# Patient Record
Sex: FEMALE | Race: WHITE | NOT HISPANIC OR LATINO | Employment: OTHER | URBAN - METROPOLITAN AREA
[De-identification: names, ages, dates, MRNs, and addresses within clinical notes are randomized per-mention and may not be internally consistent; named-entity substitution may affect disease eponyms.]

---

## 2017-01-04 ENCOUNTER — PATIENT MESSAGE (OUTPATIENT)
Dept: INTERNAL MEDICINE | Facility: CLINIC | Age: 82
End: 2017-01-04

## 2017-01-05 RX ORDER — NORTRIPTYLINE HYDROCHLORIDE 10 MG/1
10 CAPSULE ORAL NIGHTLY
Qty: 30 CAPSULE | Refills: 11 | Status: SHIPPED | OUTPATIENT
Start: 2017-01-05 | End: 2017-01-23 | Stop reason: SDUPTHER

## 2017-01-18 ENCOUNTER — PATIENT MESSAGE (OUTPATIENT)
Dept: INTERNAL MEDICINE | Facility: CLINIC | Age: 82
End: 2017-01-18

## 2017-01-23 ENCOUNTER — IMMUNIZATION (OUTPATIENT)
Dept: INTERNAL MEDICINE | Facility: CLINIC | Age: 82
End: 2017-01-23
Payer: MEDICARE

## 2017-01-23 ENCOUNTER — OFFICE VISIT (OUTPATIENT)
Dept: INTERNAL MEDICINE | Facility: CLINIC | Age: 82
End: 2017-01-23
Payer: MEDICARE

## 2017-01-23 VITALS
SYSTOLIC BLOOD PRESSURE: 120 MMHG | HEART RATE: 68 BPM | HEIGHT: 61 IN | DIASTOLIC BLOOD PRESSURE: 72 MMHG | WEIGHT: 141.56 LBS | BODY MASS INDEX: 26.73 KG/M2

## 2017-01-23 DIAGNOSIS — E11.49 TYPE II DIABETES MELLITUS WITH NEUROLOGICAL MANIFESTATIONS: Primary | ICD-10-CM

## 2017-01-23 DIAGNOSIS — Z23 VACCINE FOR STREPTOCOCCUS PNEUMONIAE AND INFLUENZA: ICD-10-CM

## 2017-01-23 DIAGNOSIS — H91.93 BILATERAL HEARING LOSS, UNSPECIFIED HEARING LOSS TYPE: ICD-10-CM

## 2017-01-23 DIAGNOSIS — F02.80 LATE ONSET ALZHEIMER'S DISEASE WITHOUT BEHAVIORAL DISTURBANCE: ICD-10-CM

## 2017-01-23 DIAGNOSIS — G30.1 LATE ONSET ALZHEIMER'S DISEASE WITHOUT BEHAVIORAL DISTURBANCE: ICD-10-CM

## 2017-01-23 PROCEDURE — 99214 OFFICE O/P EST MOD 30 MIN: CPT | Mod: 25,S$GLB,, | Performed by: INTERNAL MEDICINE

## 2017-01-23 PROCEDURE — 1160F RVW MEDS BY RX/DR IN RCRD: CPT | Mod: S$GLB,,, | Performed by: INTERNAL MEDICINE

## 2017-01-23 PROCEDURE — 1159F MED LIST DOCD IN RCRD: CPT | Mod: S$GLB,,, | Performed by: INTERNAL MEDICINE

## 2017-01-23 PROCEDURE — 90670 PCV13 VACCINE IM: CPT | Mod: S$GLB,,, | Performed by: INTERNAL MEDICINE

## 2017-01-23 PROCEDURE — 1125F AMNT PAIN NOTED PAIN PRSNT: CPT | Mod: S$GLB,,, | Performed by: INTERNAL MEDICINE

## 2017-01-23 PROCEDURE — 90662 IIV NO PRSV INCREASED AG IM: CPT | Mod: S$GLB,,, | Performed by: INTERNAL MEDICINE

## 2017-01-23 PROCEDURE — 99999 PR PBB SHADOW E&M-EST. PATIENT-LVL III: CPT | Mod: PBBFAC,,, | Performed by: INTERNAL MEDICINE

## 2017-01-23 PROCEDURE — G0009 ADMIN PNEUMOCOCCAL VACCINE: HCPCS | Mod: S$GLB,,, | Performed by: INTERNAL MEDICINE

## 2017-01-23 PROCEDURE — G0008 ADMIN INFLUENZA VIRUS VAC: HCPCS | Mod: S$GLB,,, | Performed by: INTERNAL MEDICINE

## 2017-01-23 PROCEDURE — 1157F ADVNC CARE PLAN IN RCRD: CPT | Mod: S$GLB,,, | Performed by: INTERNAL MEDICINE

## 2017-01-23 PROCEDURE — 99499 UNLISTED E&M SERVICE: CPT | Mod: S$GLB,,, | Performed by: INTERNAL MEDICINE

## 2017-01-23 RX ORDER — NORTRIPTYLINE HYDROCHLORIDE 10 MG/1
10 CAPSULE ORAL NIGHTLY
Qty: 90 CAPSULE | Refills: 11 | Status: SHIPPED | OUTPATIENT
Start: 2017-01-23 | End: 2017-04-25

## 2017-01-23 RX ORDER — METFORMIN HYDROCHLORIDE 500 MG/1
500 TABLET, EXTENDED RELEASE ORAL
Qty: 30 TABLET | Refills: 12 | Status: SHIPPED | OUTPATIENT
Start: 2017-01-23 | End: 2017-02-22

## 2017-01-23 RX ORDER — PRIMIDONE 50 MG/1
TABLET ORAL
Qty: 30 TABLET | Refills: 12 | Status: ON HOLD
Start: 2017-01-23 | End: 2017-03-24 | Stop reason: HOSPADM

## 2017-01-26 ENCOUNTER — HOSPITAL ENCOUNTER (EMERGENCY)
Facility: HOSPITAL | Age: 82
Discharge: HOME | End: 2017-01-26
Attending: EMERGENCY MEDICINE
Payer: MEDICARE

## 2017-01-26 ENCOUNTER — PATIENT MESSAGE (OUTPATIENT)
Dept: INTERNAL MEDICINE | Facility: CLINIC | Age: 82
End: 2017-01-26

## 2017-01-26 ENCOUNTER — TELEPHONE (OUTPATIENT)
Dept: INTERNAL MEDICINE | Facility: CLINIC | Age: 82
End: 2017-01-26

## 2017-01-26 VITALS
DIASTOLIC BLOOD PRESSURE: 83 MMHG | RESPIRATION RATE: 20 BRPM | TEMPERATURE: 98 F | HEART RATE: 69 BPM | WEIGHT: 141 LBS | OXYGEN SATURATION: 94 % | BODY MASS INDEX: 26.64 KG/M2 | SYSTOLIC BLOOD PRESSURE: 179 MMHG

## 2017-01-26 DIAGNOSIS — I10 ESSENTIAL HYPERTENSION: ICD-10-CM

## 2017-01-26 DIAGNOSIS — E11.49 TYPE II DIABETES MELLITUS WITH NEUROLOGICAL MANIFESTATIONS: ICD-10-CM

## 2017-01-26 DIAGNOSIS — I10 HYPERTENSION: ICD-10-CM

## 2017-01-26 DIAGNOSIS — R42 VERTIGO: ICD-10-CM

## 2017-01-26 DIAGNOSIS — R51.9 ACUTE NONINTRACTABLE HEADACHE, UNSPECIFIED HEADACHE TYPE: Primary | ICD-10-CM

## 2017-01-26 LAB
ALBUMIN SERPL BCP-MCNC: 3.3 G/DL
ALP SERPL-CCNC: 83 U/L
ALT SERPL W/O P-5'-P-CCNC: 14 U/L
ANION GAP SERPL CALC-SCNC: 7 MMOL/L
AST SERPL-CCNC: 15 U/L
BILIRUB SERPL-MCNC: 0.4 MG/DL
BUN SERPL-MCNC: 15 MG/DL
CALCIUM SERPL-MCNC: 9 MG/DL
CHLORIDE SERPL-SCNC: 101 MMOL/L
CO2 SERPL-SCNC: 29 MMOL/L
CREAT SERPL-MCNC: 0.8 MG/DL
EST. GFR  (AFRICAN AMERICAN): >60 ML/MIN/1.73 M^2
EST. GFR  (NON AFRICAN AMERICAN): >60 ML/MIN/1.73 M^2
GLUCOSE SERPL-MCNC: 284 MG/DL
POTASSIUM SERPL-SCNC: 3.8 MMOL/L
PROT SERPL-MCNC: 6.4 G/DL
SODIUM SERPL-SCNC: 137 MMOL/L

## 2017-01-26 PROCEDURE — 93005 ELECTROCARDIOGRAM TRACING: CPT

## 2017-01-26 PROCEDURE — 96365 THER/PROPH/DIAG IV INF INIT: CPT

## 2017-01-26 PROCEDURE — 25000003 PHARM REV CODE 250: Performed by: STUDENT IN AN ORGANIZED HEALTH CARE EDUCATION/TRAINING PROGRAM

## 2017-01-26 PROCEDURE — 63600175 PHARM REV CODE 636 W HCPCS: Performed by: STUDENT IN AN ORGANIZED HEALTH CARE EDUCATION/TRAINING PROGRAM

## 2017-01-26 PROCEDURE — 96367 TX/PROPH/DG ADDL SEQ IV INF: CPT

## 2017-01-26 PROCEDURE — 93010 ELECTROCARDIOGRAM REPORT: CPT | Mod: ,,, | Performed by: INTERNAL MEDICINE

## 2017-01-26 PROCEDURE — 99285 EMERGENCY DEPT VISIT HI MDM: CPT | Mod: 25

## 2017-01-26 PROCEDURE — 80053 COMPREHEN METABOLIC PANEL: CPT

## 2017-01-26 PROCEDURE — 99285 EMERGENCY DEPT VISIT HI MDM: CPT | Mod: ,,, | Performed by: EMERGENCY MEDICINE

## 2017-01-26 PROCEDURE — 63600175 PHARM REV CODE 636 W HCPCS: Performed by: EMERGENCY MEDICINE

## 2017-01-26 RX ORDER — ACETAMINOPHEN 10 MG/ML
1000 INJECTION, SOLUTION INTRAVENOUS
Status: COMPLETED | OUTPATIENT
Start: 2017-01-26 | End: 2017-01-26

## 2017-01-26 RX ORDER — MECLIZINE HCL 12.5 MG 12.5 MG/1
12.5 TABLET ORAL 3 TIMES DAILY PRN
Qty: 20 TABLET | Refills: 0 | Status: SHIPPED | OUTPATIENT
Start: 2017-01-26 | End: 2020-10-01

## 2017-01-26 RX ADMIN — ACETAMINOPHEN 1000 MG: 10 INJECTION, SOLUTION INTRAVENOUS at 07:01

## 2017-01-26 RX ADMIN — PROMETHAZINE HYDROCHLORIDE 12.5 MG: 25 INJECTION, SOLUTION INTRAMUSCULAR; INTRAVENOUS at 10:01

## 2017-01-26 NOTE — TELEPHONE ENCOUNTER
Dizzy(chronic although) top of head is hurting /  / no nausea /vomiting or diarrhea. Pt is asleep right now she will call me when she wakes up with more details

## 2017-01-26 NOTE — TELEPHONE ENCOUNTER
----- Message from Lore Martinez sent at 1/26/2017  8:18 AM CST -----  Contact: daughter/joselyn/[phone]  Pt daughter called in regard to talking to the ma about the pt med for metformin (GLUCOPHAGE-XR) 500 MG 24 hr tablet. She said that the pt maybe having a side affect.      Please advise

## 2017-01-26 NOTE — TELEPHONE ENCOUNTER
daughter states she is feeling better with the dizziness still has slight headache. She feels that the metformin is the cause. Would like recommendation if to continue it or change to something different.

## 2017-01-26 NOTE — ED AVS SNAPSHOT
[facility]  [address]  [facility] [address]               Zeina T Estopinal   2017  6:08 PM   ED    Description:  Female : 1927   Department:  [facility]           Your Care was Coordinated By:     Provider Role From To    Carmen Osuna MD Attending Provider 17 --    Mychal Malone MD Resident 17 --      Reason for Visit     Hypertension           Diagnoses this Visit        Comments    Acute nonintractable headache, unspecified headache type    -  Primary     Vertigo         Essential hypertension           ED Disposition     ED Disposition Condition Comment    Discharge             To Do List           Follow-up Information     Follow up with Alan Kaplan MD In 3 days.    Specialty:  Internal Medicine    Contact information:    [address]  [facility] [address]  [phone]         These Medications        Disp Refills Start End    meclizine (ANTIVERT) 12.5 mg tablet 20 tablet 0 2017     Take 1 tablet (12.5 mg total) by mouth 3 (three) times daily as needed for Dizziness. - Oral    Pharmacy: [facility] [address] [place] [address] [place] [address] CAR[address]UZ AT [facility] Ph #: [phone]         [facility]     [facility] -  Assistance  Registered nurses in the [facility] provide clinical advisement, health education, appointment booking, and other advisory services.  Call for this free service at [phone].             Medications           Message regarding Medications     Verify the changes and/or additions to your medication regime listed below are the same as discussed with your clinician today.  If any of these changes or additions are incorrect, please notify your healthcare provider.        START taking these NEW medications        Refills    meclizine (ANTIVERT) 12.5 mg tablet 0    Sig: Take 1 tablet (12.5 mg total) by mouth  3 (three) times daily as needed for Dizziness.    Class: Print    Route: Oral      These medications were administered today        Dose Freq    acetaminophen (10 mg/mL) injection 1,000 mg 1,000 mg ED 1 Time    Sig: Inject 100 mLs (1,000 mg total) into the vein ED 1 Time.    Class: Normal    Route: Intravenous    promethazine (PHENERGAN) 12.5 mg in dextrose 5 % 50 mL IVPB 12.5 mg ED 1 Time    Sig: Inject 12.5 mg into the vein ED 1 Time.    Class: Normal    Route: Intravenous           Verify that the below list of medications is an accurate representation of the medications you are currently taking.  If none reported, the list may be blank. If incorrect, please contact your healthcare provider. Carry this list with you in case of emergency.           Current Medications     lisinopril (PRINIVIL,ZESTRIL) 40 MG tablet TAKE 1 TABLET(40 MG) BY MOUTH EVERY DAY    metformin (GLUCOPHAGE-XR) 500 MG 24 hr tablet Take 1 tablet (500 mg total) by mouth daily with breakfast.    nortriptyline (PAMELOR) 10 MG capsule Take 1 capsule (10 mg total) by mouth every evening.    ranitidine (ZANTAC) 300 MG tablet TAKE 1 TABLET BY MOUTH EVERY EVENING    simvastatin (ZOCOR) 40 MG tablet TAKE 1 TABLET BY MOUTH ONCE DAILY    BLOOD SUGAR DIAGNOSTIC (ONE TOUCH ULTRA TEST MISC) Once.    blood sugar diagnostic (ONETOUCH ULTRA TEST) Strp Check sugars once daily 250.00    diphenoxylate-atropine 2.5-0.025 mg (LOMOTIL) 2.5-0.025 mg per tablet TAKE 1 TABLET BY MOUTH EVERY DAY    lancets (ONETOUCH DELICA LANCETS) 33 gauge Misc 1 lancet by Misc.(Non-Drug; Combo Route) route Daily.    meclizine (ANTIVERT) 12.5 mg tablet Take 1 tablet (12.5 mg total) by mouth 3 (three) times daily as needed for Dizziness.    ONE TOUCH DELICA LANCETS 33 gauge Misc     primidone (MYSOLINE) 50 MG Tab 1/4 tab q hs    sulindac (CLINORIL) 150 MG tablet Take 1 tablet (150 mg total) by mouth 2 (two) times daily.           Clinical Reference Information           Your Vitals Were      BP Pulse Temp Resp Weight SpO2    179/83 69 98.2 °F (36.8 °C) (Oral) 20 64 kg (141 lb) 94%    BMI                26.64 kg/m2          Allergies as of 1/26/2017        Reactions    Naproxen     Other reaction(s): Jittery      Immunizations Administered on Date of Encounter - 1/26/2017     None      ED Micro, Lab, POCT     Start Ordered       Status Ordering Provider    01/26/17 1939 01/26/17 1939  Comprehensive metabolic panel  STAT      Final result       ED Imaging Orders     Start Ordered       Status Ordering Provider    01/26/17 1939 01/26/17 1939  CT Head Without Contrast  1 time imaging      Final result         Discharge Instructions         Self-Care for Headaches  Most headaches aren't serious and can be relieved with self-care. But some headaches may be a sign of another health problem like eye trouble or high blood pressure. To find the best treatment, learn what kind of headaches you get. For tension headaches, self-care will usually help. To treat migraines, ask your healthcare provider for advice. It is also possible to get both tension and migraine headaches. Self-care involves relieving the pain and avoiding headache triggers if you can.    Ways to reduce pain and tension  Try these steps:  · Apply a cold compress or ice pack to the pain site.  · Drink fluids. If nausea makes it hard to drink, try sucking on ice.  · Rest. Protect yourself from bright light and loud noises.  · Calm your emotions by imagining a peaceful scene.  · Massage tight neck, shoulder, and head muscles.  · To relax muscles, soak in a hot bath or use a hot shower.  Use medicines  Aspirin or aspirin substitutes, such as ibuprofen and acetaminophen, can relieve headache. Remember: Never give aspirin to anyone 18 years old or younger because of the risk of developing Reye syndrome. Use pain medicines only when necessary.  Track your headaches  Keeping a headache diary can help you and your healthcare provider identify what's  "causing your headaches:  · Note when each headache happens.  · Identify your activities and the foods you've eaten 6 to 8 hours before the headache began.  · Look for any trends or "triggers."  Signs of tension headache  Any of the following can be signs:  · Dull pain or feeling of pressure in a tight band around your head  · Pain in your neck or shoulders  · Headache without a definite beginning or end  · Headache after an activity such as driving or working on a computer  Signs of migraine  Any of the following can be signs:  · Throbbing pain on one or both sides of your head  · Nausea or vomiting  · Extreme sensitivity to light, sound, and smells  · Bright spots, flashes, or other visual changes  · Pain or nausea so severe that you can't continue your daily activities  Call your healthcare provider   If you have any of the following symptoms, contact your healthcare provider:  · A headache that lingers after a recent injury or bump to the head.  · A fever with a stiff neck or pain when you bend your head toward your chest.  · A headache along with slurred speech, changes in your vision, or numbness or weakness in your arms or legs.  · A headache for longer than 3 days.  · Frequent headaches, especially in the morning.  · Headaches with seizures   · Seek immediate medical attention if you have a headache that you would call "the worst headache you have ever had."   © 9957-0513 [employer]. [address] [place] [address], [place] [address]. All rights reserved. This information is not intended as a substitute for professional medical care. Always follow your healthcare professional's instructions.        Vertigo (Unknown Cause)    In addition to helping with hearing, the inner ear is part of the balance center of your body. Problems with the inner ear can a false feeling of motion. This is called vertigo. Often, it feels as if you or the room is spinning. A vertigo attack may cause sudden nausea, vomiting and " heavy sweating. Severe vertigo causes a loss of balance and can cause you to fall. During vertigo, small head movements and changes in body position will often make the symptoms worse. You may also have ringing in the ears called tinnitus.  An episode of vertigo may last seconds, minutes or hours. Once you are over the first episode, it may never come back. However, symptoms may return off and on.  The cause of your vertigo is not yet known. Possible causes of vertigo include:  · Inflammation of the inner ear  · Disease of the nerves to the inner ear  · Movement of calcium particles in the inner ear  · Poor blood flow to the balance centers of the brain  · Migraine headaches  Home care  · If symptoms are severe, rest quietly in bed. Change positions very slowly. There is usually one position that will feel best, such as lying on one side or lying on your back with your head slightly raised on pillows.  · Do not drive a car or work with dangerous machinery until symptoms have been gone for at least one week.  · Take medicine as prescribed to relieve your symptoms. Unless another medicine was prescribed for symptoms of nausea, vomiting, and dizziness, you may use over-the-counter motion sickness pills. Ask your pharmacist for suggestions.  Follow-up care  Follow up with your healthcare provider or as directed. If you are referred to a specialist or for testing, make the appointment promptly.  When to seek medical advice  Call your healthcare provider if any of the following occur:  · Fever of 100.4°F (38ºC) or higher, or as directed by your healthcare provider  · Vertigo worsens or is not controlled by prescribed medicine   · Repeated vomiting not relieved by prescribed medicine   · Severe headache  · Confusion  · Weakness of an arm or leg or one side of the face  · Difficulty with speech or vision  · Loss of consciousness   · Seizure  © 4189-7734 [employer]. [address] [place] [address], [place] [address].  All rights reserved. This information is not intended as a substitute for professional medical care. Always follow your healthcare professional's instructions.             [facility] complies with applicable Federal civil rights laws and does not discriminate on the basis of race, color, national origin, age, disability, or sex.        Language Assistance Services     ATTENTION: Language assistance services are available, free of charge. Please call [phone].      ATENCIÓN: Si habla español, tiene a arnold disposición servicios gratuitos de asistencia lingüística. Llame al [phone].     CHÚ Ý: N?u b?n nói Ti?ng Vi?t, có các d?ch v? h? tr? ngôn ng? mi?n phí dành cho b?n. G?i s? [phone].

## 2017-01-27 NOTE — DISCHARGE INSTRUCTIONS
"  Self-Care for Headaches  Most headaches aren't serious and can be relieved with self-care. But some headaches may be a sign of another health problem like eye trouble or high blood pressure. To find the best treatment, learn what kind of headaches you get. For tension headaches, self-care will usually help. To treat migraines, ask your healthcare provider for advice. It is also possible to get both tension and migraine headaches. Self-care involves relieving the pain and avoiding headache triggers if you can.    Ways to reduce pain and tension  Try these steps:  · Apply a cold compress or ice pack to the pain site.  · Drink fluids. If nausea makes it hard to drink, try sucking on ice.  · Rest. Protect yourself from bright light and loud noises.  · Calm your emotions by imagining a peaceful scene.  · Massage tight neck, shoulder, and head muscles.  · To relax muscles, soak in a hot bath or use a hot shower.  Use medicines  Aspirin or aspirin substitutes, such as ibuprofen and acetaminophen, can relieve headache. Remember: Never give aspirin to anyone 18 years old or younger because of the risk of developing Reye syndrome. Use pain medicines only when necessary.  Track your headaches  Keeping a headache diary can help you and your healthcare provider identify what's causing your headaches:  · Note when each headache happens.  · Identify your activities and the foods you've eaten 6 to 8 hours before the headache began.  · Look for any trends or "triggers."  Signs of tension headache  Any of the following can be signs:  · Dull pain or feeling of pressure in a tight band around your head  · Pain in your neck or shoulders  · Headache without a definite beginning or end  · Headache after an activity such as driving or working on a computer  Signs of migraine  Any of the following can be signs:  · Throbbing pain on one or both sides of your head  · Nausea or vomiting  · Extreme sensitivity to light, sound, and " "smells  · Bright spots, flashes, or other visual changes  · Pain or nausea so severe that you can't continue your daily activities  Call your healthcare provider   If you have any of the following symptoms, contact your healthcare provider:  · A headache that lingers after a recent injury or bump to the head.  · A fever with a stiff neck or pain when you bend your head toward your chest.  · A headache along with slurred speech, changes in your vision, or numbness or weakness in your arms or legs.  · A headache for longer than 3 days.  · Frequent headaches, especially in the morning.  · Headaches with seizures   · Seek immediate medical attention if you have a headache that you would call "the worst headache you have ever had."   © 6720-4051 [employer]. [address] [place] [address], [place] [address]. All rights reserved. This information is not intended as a substitute for professional medical care. Always follow your healthcare professional's instructions.        Vertigo (Unknown Cause)    In addition to helping with hearing, the inner ear is part of the balance center of your body. Problems with the inner ear can a false feeling of motion. This is called vertigo. Often, it feels as if you or the room is spinning. A vertigo attack may cause sudden nausea, vomiting and heavy sweating. Severe vertigo causes a loss of balance and can cause you to fall. During vertigo, small head movements and changes in body position will often make the symptoms worse. You may also have ringing in the ears called tinnitus.  An episode of vertigo may last seconds, minutes or hours. Once you are over the first episode, it may never come back. However, symptoms may return off and on.  The cause of your vertigo is not yet known. Possible causes of vertigo include:  · Inflammation of the inner ear  · Disease of the nerves to the inner ear  · Movement of calcium particles in the inner ear  · Poor blood flow to the balance centers " of the brain  · Migraine headaches  Home care  · If symptoms are severe, rest quietly in bed. Change positions very slowly. There is usually one position that will feel best, such as lying on one side or lying on your back with your head slightly raised on pillows.  · Do not drive a car or work with dangerous machinery until symptoms have been gone for at least one week.  · Take medicine as prescribed to relieve your symptoms. Unless another medicine was prescribed for symptoms of nausea, vomiting, and dizziness, you may use over-the-counter motion sickness pills. Ask your pharmacist for suggestions.  Follow-up care  Follow up with your healthcare provider or as directed. If you are referred to a specialist or for testing, make the appointment promptly.  When to seek medical advice  Call your healthcare provider if any of the following occur:  · Fever of 100.4°F (38ºC) or higher, or as directed by your healthcare provider  · Vertigo worsens or is not controlled by prescribed medicine   · Repeated vomiting not relieved by prescribed medicine   · Severe headache  · Confusion  · Weakness of an arm or leg or one side of the face  · Difficulty with speech or vision  · Loss of consciousness   · Seizure  © 2098-7835 [employer]. [address] [place] [address], [place] [address]. All rights reserved. This information is not intended as a substitute for professional medical care. Always follow your healthcare professional's instructions.

## 2017-01-27 NOTE — ED PROVIDER NOTES
Encounter Date: 1/26/2017    SCRIBE #1 NOTE: I, Chiquis Livingston, am scribing for, and in the presence of,  Dr. Osuna . I have scribed the following portions of the note - the Resident attestation.       History     Chief Complaint   Patient presents with    Hypertension     and headache with dizziness     Review of patient's allergies indicates:   Allergen Reactions    Naproxen      Other reaction(s): Jittery     HPI Comments: Zeina White is a 88 y/o female with a past medical history of GERD, dementia, HLD, HTN, T2DM, and depression who presents to [facility] ED with complaints of dizziness and headache of one day duration. Patient's daughter provided most of the history as patient has a lot of difficulty with hearing. Patient began to complain of headache and dizziness this morning, saying that the room was spinning, even when she was lying down. The patient denies chest pain, SOB, nausea, vomiting, decreased UOP, leg swelling, and numbness. She reports that the dizziness has continued throughout the day and the headache has been an issue on and off. The only recent medication change the patient has had is the addition of metformin 2 days ago.No other complaints at this time.    The history is provided by the patient and a relative.     Past Medical History   Diagnosis Date    Anxiety     Atypical chest pain     Dementia     Diabetes mellitus     DJD (degenerative joint disease)     GERD (gastroesophageal reflux disease)     Hyperlipidemia     Hypertension     Irritable bowel     Tremor, essential     Vertigo      Past Medical History Pertinent Negatives   Diagnosis Date Noted    Cancer 9/2/2015    Stroke 9/2/2015     Past Surgical History   Procedure Laterality Date    Joint replacement       L total knee    Quadriceps repair       R    Knee surgery       bilat    Hysterectomy       Family History   Problem Relation Age of Onset    Cancer Mother      kidney    Diabetes Father      Social  History   Substance Use Topics    Smoking status: Never Smoker    Smokeless tobacco: Never Used    Alcohol use No     Review of Systems  General: Negative for weight gain, weakness, fatigue, fevers, chills  HEENT: Negative for sore throat, vision changes, congestion  CVS: Negative for chest pain, pressure, palpitations  Resp: Negative for SOB, cough, or wheezing  GI: Negative for nausea, vomiting, constipation, or abdominal pain  : Negative for dysuria or hematuria  MSK: Negative for myalgias or arthralgias.  Skin: Negative for rashes or bleeding  Neuro: Positive for headache, dizziness. Negative for syncope, numbness, paresthesias  All other systems otherwise negative  Physical Exam   Initial Vitals   BP Pulse Resp Temp SpO2   01/26/17 1701 01/26/17 1701 01/26/17 1701 01/26/17 1701 01/26/17 1701   220/130 20 16 98.5 °F (36.9 °C) 97 %     Physical Exam  Cons: AOx3, NAD, well developed and well nourished  HENT: Normocephalic and atraumatic.   Eyes: PERRL, No scleral icterus.    Neck: Neck supple. No thyromegaly present.   CV: S1 S2 RRR, without murmur, gallop, or rub. Trace lower extremity edema present.    Pulmonary: clear to auscultation bilaterally  Abdomen: Soft. Bowel sounds are normal. Nontender, nondistended  Neurological: Normal strength and normal reflexes. No cranial nerve deficit.   Skin: Skin is warm and dry. No rash noted.   Psychiatric: Patient has a normal mood and affect. Behavior is normal. Thought content normal. Cognition and memory are normal.     ED Course   Procedures  Labs Reviewed   COMPREHENSIVE METABOLIC PANEL - Abnormal; Notable for the following:        Result Value    Glucose 284 (*)     Albumin 3.3 (*)     Anion Gap 7 (*)     All other components within normal limits             Medical Decision Making:   Initial Assessment:   Zeina White is a 88 y/o female with a past medical history of GERD, dementia, HLD, HTN, T2DM, and depression who presents to [facility] ED with complaints of  "dizziness and headache of one day duration.  Differential Diagnosis:   Vertigo vs ICH vs hypertensive urgency  Clinical Tests:   Lab Tests: Ordered  Radiological Study: Ordered  ED Management:  -- Patient given phenergan 12.5 mg IV for dizziness  -- IV Tylenol 1000 mg given for headache  -- CMP and CT Head without contrast ordered  Other:   I have discussed this case with another health care provider.    Mychal Malone MD PGY-1  Emergency Medicine  01/26/2017 7:50 PM              Scribe Attestation:   Scribe #1: I performed the above scribed service and the documentation accurately describes the services I performed. I attest to the accuracy of the note.    Attending Attestation:   Physician Attestation Statement for Resident:  As the supervising MD   Physician Attestation Statement: I have personally seen and examined this patient.   I agree with the above history. -: Patient has a throbbing headache and intermittent vertigo spells. She is in no apparent distress. CT does not show anything acute. Will give medications for dizziness and see how she feels. May consider observing her in the hospital.    As the supervising MD I agree with the above PE.    As the supervising MD I agree with the above treatment, course, plan, and disposition.   -: Patient improved significantly with Phenergan and fluids.  Patient's family notes her dizziness is recurrent for "years" and not concerning to them.  They are pleased headache and BP have improved.  After extensive discussion with them, we all have decided that the patient can go home and follow closely with PCP.           Physician Attestation for Scribe:  Physician Attestation Statement for Scribe #1: I, Dr. Osuna, reviewed documentation, as scribed by Chiquis Livingston in my presence, and it is both accurate and complete.                 ED Course     Clinical Impression:   The primary encounter diagnosis was Acute nonintractable headache, unspecified headache type. " Diagnoses of Vertigo and Essential hypertension were also pertinent to this visit.          Mychal Malone MD  Resident  01/27/17 1341       Mahamed Osuna MD  01/30/17 1046

## 2017-01-27 NOTE — ED NOTES
Patient reports dizziness since this morning.  Reports headache to top and back of her head since this morning.  Denies chest pain, SOB.  CBG was 200 this morning at home.  Denies n/v/d, fevers.

## 2017-01-30 NOTE — PROGRESS NOTES
Subjective:       Patient ID: Zeina White is a 89 y.o. female.    Chief Complaint: Annual Exam    Diabetes   She presents for her follow-up diabetic visit. She has type 2 diabetes mellitus. Her disease course has been stable. Hypoglycemia symptoms include nervousness/anxiousness. Pertinent negatives for diabetes include no chest pain, no polydipsia, no polyphagia and no polyuria. Symptoms are stable. Diabetic complications include peripheral neuropathy.     Review of Systems   Cardiovascular: Negative for chest pain.   Endocrine: Negative for polydipsia, polyphagia and polyuria.   Psychiatric/Behavioral: Positive for decreased concentration and dysphoric mood. The patient is nervous/anxious.        Objective:      Physical Exam   Constitutional: She is oriented to person, place, and time. She appears well-developed and well-nourished. No distress.   HENT:   Head: Normocephalic and atraumatic.   Mouth/Throat: Oropharynx is clear and moist.   Eyes: Conjunctivae are normal. Pupils are equal, round, and reactive to light.   Neck: Normal range of motion. Neck supple.   Cardiovascular: Normal rate, regular rhythm and normal heart sounds.    Pulmonary/Chest: Effort normal and breath sounds normal. She has no wheezes.   Abdominal: Soft. Bowel sounds are normal. There is no tenderness.   Musculoskeletal: Normal range of motion. She exhibits no edema or tenderness.   Neurological: She is alert and oriented to person, place, and time. No cranial nerve deficit.   Skin: No erythema.   Psychiatric: She has a normal mood and affect. Cognition and memory are impaired.   Vitals reviewed.      Assessment:       1. Type II diabetes mellitus with neurological manifestations    2. Vaccine for streptococcus pneumoniae and influenza    3. Late onset Alzheimer's disease without behavioral disturbance    4. Bilateral hearing loss, unspecified hearing loss type        Plan:       Zeina was seen today for annual exam.    Diagnoses and all  orders for this visit:    Type II diabetes mellitus with neurological manifestations  -     metformin (GLUCOPHAGE-XR) 500 MG 24 hr tablet; Take 1 tablet (500 mg total) by mouth daily with breakfast.    Vaccine for streptococcus pneumoniae and influenza  -     Pneumococcal Conjugate Vaccine (13 Valent) (IM)    Late onset Alzheimer's disease without behavioral disturbance  Comments:  discussed with daughter-  will try some adjustments at home    Bilateral hearing loss, unspecified hearing loss type    Other orders  -     nortriptyline (PAMELOR) 10 MG capsule; Take 1 capsule (10 mg total) by mouth every evening.  -     primidone (MYSOLINE) 50 MG Tab; 1/4 tab q hs        Return in about 6 months (around 7/23/2017).

## 2017-02-14 ENCOUNTER — PATIENT MESSAGE (OUTPATIENT)
Dept: INTERNAL MEDICINE | Facility: CLINIC | Age: 82
End: 2017-02-14

## 2017-02-21 ENCOUNTER — TELEPHONE (OUTPATIENT)
Dept: INTERNAL MEDICINE | Facility: CLINIC | Age: 82
End: 2017-02-21

## 2017-02-21 RX ORDER — METFORMIN HYDROCHLORIDE 500 MG/1
TABLET ORAL
Refills: 2 | COMMUNITY
Start: 2017-02-15 | End: 2017-06-14 | Stop reason: SDUPTHER

## 2017-02-24 ENCOUNTER — OFFICE VISIT (OUTPATIENT)
Dept: PODIATRY | Facility: CLINIC | Age: 82
End: 2017-02-24
Payer: MEDICARE

## 2017-02-24 VITALS
BODY MASS INDEX: 26.62 KG/M2 | HEIGHT: 61 IN | HEART RATE: 94 BPM | DIASTOLIC BLOOD PRESSURE: 87 MMHG | WEIGHT: 141 LBS | SYSTOLIC BLOOD PRESSURE: 154 MMHG

## 2017-02-24 DIAGNOSIS — B35.1 ONYCHOMYCOSIS DUE TO DERMATOPHYTE: ICD-10-CM

## 2017-02-24 DIAGNOSIS — E11.9 COMPREHENSIVE DIABETIC FOOT EXAMINATION, TYPE 2 DM, ENCOUNTER FOR: ICD-10-CM

## 2017-02-24 DIAGNOSIS — E11.49 TYPE II DIABETES MELLITUS WITH NEUROLOGICAL MANIFESTATIONS: Primary | ICD-10-CM

## 2017-02-24 PROCEDURE — 99999 PR PBB SHADOW E&M-EST. PATIENT-LVL III: CPT | Mod: PBBFAC,,, | Performed by: PODIATRIST

## 2017-02-24 PROCEDURE — 99499 UNLISTED E&M SERVICE: CPT | Mod: S$GLB,,, | Performed by: PODIATRIST

## 2017-02-24 PROCEDURE — 1159F MED LIST DOCD IN RCRD: CPT | Mod: S$GLB,,, | Performed by: PODIATRIST

## 2017-02-24 PROCEDURE — 1126F AMNT PAIN NOTED NONE PRSNT: CPT | Mod: S$GLB,,, | Performed by: PODIATRIST

## 2017-02-24 PROCEDURE — 1160F RVW MEDS BY RX/DR IN RCRD: CPT | Mod: S$GLB,,, | Performed by: PODIATRIST

## 2017-02-24 PROCEDURE — 11721 DEBRIDE NAIL 6 OR MORE: CPT | Mod: Q9,S$GLB,, | Performed by: PODIATRIST

## 2017-02-24 PROCEDURE — 1157F ADVNC CARE PLAN IN RCRD: CPT | Mod: S$GLB,,, | Performed by: PODIATRIST

## 2017-02-24 PROCEDURE — 99213 OFFICE O/P EST LOW 20 MIN: CPT | Mod: 25,S$GLB,, | Performed by: PODIATRIST

## 2017-02-24 NOTE — PROGRESS NOTES
Subjective:      Patient ID: Zeina White is a 89 y.o. female.    Chief Complaint: No chief complaint on file.     has a past medical history of Anxiety; Atypical chest pain; Dementia; Diabetes mellitus; DJD (degenerative joint disease); GERD (gastroesophageal reflux disease); Hyperlipidemia; Hypertension; Irritable bowel; Tremor, essential; and Vertigo.    Presents for DM foot exam. Daughter present for visit. Also reports some numbness and tingling to both feet.      Review of Systems   Cardiovascular: Negative for chest pain and claudication.   Respiratory: Negative for cough.    Skin: Positive for color change, dry skin and nail changes.   Musculoskeletal: Positive for joint pain.   Gastrointestinal: Negative for nausea.   Neurological: Positive for numbness and paresthesias.           Objective:      Physical Exam   Constitutional: She appears well-developed.   Cardiovascular:   Dorsalis pedis and posterior tibial pulses are diminished Bilaterally. Toes are cool to touch. Feet are warm proximally.There is decreased digital hair. Skin is atrophic, slightly hyperpigmented, and mildly edematous    Pulmonary/Chest: No respiratory distress.   Musculoskeletal: She exhibits tenderness at toes 1 and 2 on right foot. She exhibits no edema.   Adequate joint range of motion without pain, limitation, nor crepitation Bilateral feet and ankle joints. Muscle strength is 5/5 in all groups bilaterally.    Feet:   Right Foot:   Skin Integrity: Positive for callus and dry skin. Negative for ulcer or skin breakdown.   Left Foot:   Skin Integrity: Positive for dry skin. Negative for ulcer.   Neurological: She is alert.   [place] 5.07 monofilamant testing is diminished Jag feet. Sharp/dull sensation diminished Bilaterally. Light touch absent Bilaterally.    Skin: Skin is dry. No erythema.   Nails x10 are elongated by 2-6mm's, thickened by 3-5 mm's, dystrophic, and are darkened in coloration . Xerosis Bilaterally. No  "open lesions noted   Callus noted at left third toe distally      Psychiatric: She has a normal mood and affect.             Assessment:       Encounter Diagnoses   Name Primary?    Type II diabetes mellitus with neurological manifestations Yes    Onychomycosis due to dermatophyte     Comprehensive diabetic foot examination, type 2 DM, encounter for        Narrative   3 views: There is a severe hallux valgus deformity with DJD.  There are spurs on the calcaneus.  No fracture dislocation bone destruction seen.      Electronically signed by: RAMIREZ GOLD  Date: 10/26/16  Time: 11:23            Plan:       Diagnoses and all orders for this visit:    Type II diabetes mellitus with neurological manifestations  -     Foot Care    Onychomycosis due to dermatophyte    Comprehensive diabetic foot examination, type 2 DM, encounter for    Routine Foot Care  Date/Time: 2/24/2017 3:35 PM  Performed by: JD SNEED JR.  Authorized by: JD SNEED JR.     Time out: Immediately prior to procedure a "time out" was called to verify the correct patient, procedure, equipment, support staff and site/side marked as required.    Consent Done?:  Yes (Verbal)  Hyperkeratotic Skin Lesions?: No      Nail Care Type:  Debride  Location(s): All  (Left 1st Toe, Left 3rd Toe, Left 2nd Toe, Left 4th Toe, Left 5th Toe, Right 1st Toe, Right 2nd Toe, Right 3rd Toe, Right 4th Toe and Right 5th Toe)  Patient tolerance:  Patient tolerated the procedure well with no immediate complications        ADA Risk Classification: LOPS with or without deformity - 1: rtc 3-6 months       I counseled the patient on her conditions, their implications and medical management.    - Shoe inspection. Diabetic Foot Education. Patient reminded of the importance of good nutrition and blood sugar control to help prevent podiatric complications of diabetes. Patient instructed on proper foot hygeine. We discussed wearing proper shoe gear, daily foot inspections, " never walking without protective shoe gear, never putting sharp instruments to feet    -dispensed offloading pads for toes 1 and 2    -XR R foot reviewed: no acute abnormalities    rtc 3-6 months

## 2017-02-24 NOTE — PATIENT INSTRUCTIONS
Diabetes: Inspecting Your Feet    Diabetes increases your chances of developing foot problems. So inspect your feet every day. This helps you find small skin irritations before they become serious ulcers or infections. If you have trouble seeing the bottoms of your feet, use a mirror or ask a family member or friend to help.  How to check your feet  Below are tips to help you look for foot problems. Try to check your feet at the same time each day, such as when you get out of bed in the morning:  · Check the top of each foot. The tops of toes, back of the heel, and outer edge of the foot can get a lot of rubbing from poor-fitting shoes.  · Check the bottom of each foot. Daily wear and tear often leads to problems at pressure spots.  · Check the toes and nails. Fungal infections often occur between toes. Toenail problems can also be a sign of fungal infections or lead to breaks in the skin.  · Check your shoes, too. Loose objects inside a shoe can injure the foot. Use your hand to feel inside your shoes for things like lelo, loose stitching, or rough areas that could irritate your skin.  Warning signs  Look for any color changes in the foot. Redness with streaks can signal a severe infection, which needs immediate medical attention. Tell your healthcare provider right away if you have any of these problems:  · Swelling, sometimes with color changes, may be a sign of poor blood flow or infection. Symptoms include tenderness and an increase in the size of your foot.  · Warm or hot areas on your feet may be signs of infection. A foot that is cold may not be getting enough blood.  · Sensations such as burning, tingling, or pins and needles can be signs of a problem. Also check for areas that may be numb.  · Hot spots are caused by friction or pressure. Look for hot spots in areas that get a lot of rubbing. Hot spots can turn into blisters, calluses, or sores.  · Cracks and sores are caused by dry or irritated  skin. They are a sign that the skin is breaking down, which can lead to infection.  · Toenail problems to watch for include nails growing into the skin (ingrown toenail) and causing redness or pain. Thick, yellow, or discolored nails can signal a fungal infection.  · Drainage and odor can develop from untreated sores and ulcers. Call your healthcare provider right away if you notice white or yellow drainage, bleeding, or unpleasant odor.   Date Last Reviewed: 6/1/2016 © 2000-2016 [employer]. [address] [place] [id number]. All rights reserved. This information is not intended as a substitute for professional medical care. Always follow your healthcare professional's instructions.      Your Diabetes Foot Care Program    Every day you depend on your feet to keep you moving. But when you have diabetes, your feet need special care. Even a small foot problem can become very serious. So dont take your feet for granted. By working with your diabetes healthcare team, you can learn how to protect your feet and keep them healthy.  Evaluating your feet  An evaluation helps your healthcare provider check the condition of your feet. The evaluation includes a review of your diabetes history and overall health. It may also include a foot exam, X-rays, or other tests. These can help show problems beneath the skin that you cant see or feel.  Medical history  You will be asked about your overall health and any history of foot problems. Youll also discuss your diabetes history, such as whether your blood sugar level has changed over time. It also includes questions about sensations of pain, tingling, pins and needles, or numbness. Your healthcare provider will also want to know if you have high blood pressure and heart disease, or if you smoke. Be sure to mention any medicines (including over-the-counter), supplements, or herbal remedies you take.  Foot exam  A foot exam checks the condition of different  parts of your foot. First, your skin and nails are examined for any signs of infection. Blood flow is checked by feeling for the pulses in each foot. You may also have tests to study the nerves in the foot. These include using a small filament (wire) to see how sensitive your feet are. In certain cases, you will be asked to walk a short distance to check for bone, joint, and muscle problems.  Diagnostic tests  If needed, your healthcare provider will suggest certain tests to learn more about your feet. These include:  · Doppler tests to measure blood flow in the feet and lower leg.  · X-rays, which can show bone or joint problems.  · Other imaging tests, such as an MRI (magnetic resonance imaging), bone scan, and CT (computed tomography) scan. These can help show bone infections.  · Other tests, such as vascular tests, which study the blood flow in your feet and legs. You may also have nerve studies to learn how sensitive your feet are.  Creating a foot care program  Based on the evaluation, your healthcare provider will create a foot care program for you. Your program may be as simple as starting a daily self-care routine and changing the types of shoes your wear. It may also involve treating minor foot problems, such as a corn or blister. In some cases, surgery will be needed to treat an infection or mechanical problems, such as hammer toes.  Preventing problems  When you have diabetes, its easier to prevent problems than to treat them later on. So see your healthcare team for regular checkups and foot care. Your healthcare team can also help you learn more about caring for your feet at home. For example, you may be told to avoid walking barefoot. Or you may be told that special footwear is needed to protect your feet.  Have regular checkups  Foot problems can develop quickly. So be sure to follow your healthcare teams schedule for regular checkups. During office visits, take off your shoes and socks as soon as  you get in the exam room. Ask your healthcare provider to examine your feet for problems. This will make it easier to find and treat small skin irritations before they get worse. Regular checkups can also help keep track of the blood flow and feeling in your feet. If you have neuropathy (lack of feeling in your feet), you will need to have checkups more often.  Learn about self-care  The more you know about diabetes and your feet, the easier it will be to prevent problems. Members of your healthcare team can teach you how to inspect your feet and teach you to look for warning signs. They can also give you other foot care tips. During office visits, be sure to ask any questions you have.  Date Last Reviewed: 7/1/2016 © 2000-2016 [employer]. [address] [place] [address], [place] [address]. All rights reserved. This information is not intended as a substitute for professional medical care. Always follow your healthcare professional's instructions.        Long-Term Complications of Diabetes    Diabetes can cause health problems over time. These are called complications. They are more likely to happen if your blood sugar is often too high. Over time, high blood sugar can damage blood vessels in your body. It is important to keep your blood sugar in your target range. This can help prevent or delay complications from diabetes.  Possible complications  Complications of diabetes include:  · Eye problems, including damage to the blood vessels in the eyes (retinopathy), pressure in the eye (glaucoma), and clouding of the eyes lens (a cataract). Eye problems can eventually lead to irreversible blindness.   · Tooth and gum problems (periodontal disease), causing loss of teeth and bone  · Blood vessel (vascular) disease leading to circulation problems, heart attack or stroke, or a need for amputation of a limb   · Problems with sexual function leading to erectile dysfunction in men and sexual discomfort in  women   · Kidney disease (nephropathy) can eventually lead to kidney failure, which may require dialysis or kidney transplant   · Nerve problems (neuropathy), causing pain or loss of feeling in your feet and other parts of your body, potentially leading to an amputation of a limb   · High blood pressure (hypertension), putting strain on your heart and blood vessels  · Serious infections, possibly leading to loss of toes, feet, or limbs  How to avoid complications  The serious consequences of these complications may be avoidable for most people with diabetes by managing your blood glucose, blood pressure, and cholesterol levels. This can help you feel better and stay healthy. You can manage diabetes by tracking your blood sugar. You can also eat healthy and exercise to avoid gaining weight. And you should take medicine if directed by your healthcare provider.  Date Last Reviewed: 5/1/2016 © 2000-2016 [employer]. [address] [place] [address], [place] [address]. All rights reserved. This information is not intended as a substitute for professional medical care. Always follow your healthcare professional's instructions.

## 2017-03-21 ENCOUNTER — SURGERY (OUTPATIENT)
Age: 82
End: 2017-03-21

## 2017-03-21 ENCOUNTER — HOSPITAL ENCOUNTER (INPATIENT)
Facility: HOSPITAL | Age: 82
LOS: 3 days | Discharge: NURSING, REHAB OR LONG-TERM CARE | DRG: 482 | End: 2017-03-24
Attending: EMERGENCY MEDICINE | Admitting: EMERGENCY MEDICINE
Payer: MEDICARE

## 2017-03-21 ENCOUNTER — ANESTHESIA EVENT (OUTPATIENT)
Dept: SURGERY | Facility: HOSPITAL | Age: 82
DRG: 482 | End: 2017-03-21
Payer: MEDICARE

## 2017-03-21 ENCOUNTER — ANESTHESIA (OUTPATIENT)
Dept: SURGERY | Facility: HOSPITAL | Age: 82
DRG: 482 | End: 2017-03-21
Payer: MEDICARE

## 2017-03-21 DIAGNOSIS — G30.1 LATE ONSET ALZHEIMER'S DISEASE WITHOUT BEHAVIORAL DISTURBANCE: ICD-10-CM

## 2017-03-21 DIAGNOSIS — R42 DIZZINESS AND GIDDINESS: ICD-10-CM

## 2017-03-21 DIAGNOSIS — F02.80 LATE ONSET ALZHEIMER'S DISEASE WITHOUT BEHAVIORAL DISTURBANCE: ICD-10-CM

## 2017-03-21 DIAGNOSIS — S72.002A CLOSED LEFT HIP FRACTURE, INITIAL ENCOUNTER: Primary | ICD-10-CM
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
## 2017-03-21 DIAGNOSIS — K58.9 IRRITABLE BOWEL SYNDROME, UNSPECIFIED TYPE: ICD-10-CM

## 2017-03-21 DIAGNOSIS — M25.559 HIP PAIN: ICD-10-CM

## 2017-03-21 DIAGNOSIS — S72.042A: ICD-10-CM

## 2017-03-21 PROBLEM — S82.90XD: Status: ACTIVE | Noted: 2017-03-21

## 2017-03-21 LAB
ABO + RH BLD: NORMAL
ABO + RH BLD: NORMAL
ALBUMIN SERPL BCP-MCNC: 3.4 G/DL
ALP SERPL-CCNC: 82 U/L
ALT SERPL W/O P-5'-P-CCNC: 16 U/L
ANION GAP SERPL CALC-SCNC: 12 MMOL/L
ANION GAP SERPL CALC-SCNC: 14 MMOL/L
AST SERPL-CCNC: 16 U/L
BACTERIA #/AREA URNS AUTO: NORMAL /HPF
BASOPHILS # BLD AUTO: 0.03 K/UL
BASOPHILS NFR BLD: 0.2 %
BILIRUB SERPL-MCNC: 0.5 MG/DL
BILIRUB UR QL STRIP: NEGATIVE
BLD GP AB SCN CELLS X3 SERPL QL: NORMAL
BLD GP AB SCN CELLS X3 SERPL QL: NORMAL
BUN SERPL-MCNC: 15 MG/DL
BUN SERPL-MCNC: 16 MG/DL
CALCIUM SERPL-MCNC: 8.4 MG/DL
CALCIUM SERPL-MCNC: 8.8 MG/DL
CHLORIDE SERPL-SCNC: 101 MMOL/L
CHLORIDE SERPL-SCNC: 102 MMOL/L
CLARITY UR REFRACT.AUTO: CLEAR
CO2 SERPL-SCNC: 21 MMOL/L
CO2 SERPL-SCNC: 26 MMOL/L
COLOR UR AUTO: ABNORMAL
CREAT SERPL-MCNC: 0.7 MG/DL
CREAT SERPL-MCNC: 0.7 MG/DL
DIFFERENTIAL METHOD: ABNORMAL
EOSINOPHIL # BLD AUTO: 0.1 K/UL
EOSINOPHIL NFR BLD: 0.6 %
ERYTHROCYTE [DISTWIDTH] IN BLOOD BY AUTOMATED COUNT: 13.3 %
EST. GFR  (AFRICAN AMERICAN): >60 ML/MIN/1.73 M^2
EST. GFR  (AFRICAN AMERICAN): >60 ML/MIN/1.73 M^2
EST. GFR  (NON AFRICAN AMERICAN): >60 ML/MIN/1.73 M^2
EST. GFR  (NON AFRICAN AMERICAN): >60 ML/MIN/1.73 M^2
ESTIMATED AVG GLUCOSE: 229 MG/DL
GLUCOSE SERPL-MCNC: 276 MG/DL
GLUCOSE SERPL-MCNC: 281 MG/DL
GLUCOSE UR QL STRIP: ABNORMAL
HBA1C MFR BLD HPLC: 9.6 %
HCT VFR BLD AUTO: 37.2 %
HCT VFR BLD AUTO: 41.1 %
HGB BLD-MCNC: 12.5 G/DL
HGB BLD-MCNC: 13.4 G/DL
HGB UR QL STRIP: NEGATIVE
KETONES UR QL STRIP: ABNORMAL
LEUKOCYTE ESTERASE UR QL STRIP: NEGATIVE
LYMPHOCYTES # BLD AUTO: 1 K/UL
LYMPHOCYTES NFR BLD: 8.4 %
MCH RBC QN AUTO: 29.3 PG
MCHC RBC AUTO-ENTMCNC: 32.6 %
MCV RBC AUTO: 90 FL
MICROSCOPIC COMMENT: NORMAL
MONOCYTES # BLD AUTO: 0.6 K/UL
MONOCYTES NFR BLD: 4.9 %
NEUTROPHILS # BLD AUTO: 10.5 K/UL
NEUTROPHILS NFR BLD: 85.3 %
NITRITE UR QL STRIP: NEGATIVE
PH UR STRIP: 7 [PH] (ref 5–8)
PLATELET # BLD AUTO: 179 K/UL
PMV BLD AUTO: 11.6 FL
POCT GLUCOSE: 222 MG/DL (ref 70–110)
POCT GLUCOSE: 223 MG/DL (ref 70–110)
POCT GLUCOSE: 247 MG/DL (ref 70–110)
POCT GLUCOSE: 281 MG/DL (ref 70–110)
POTASSIUM SERPL-SCNC: 3.6 MMOL/L
POTASSIUM SERPL-SCNC: 4.2 MMOL/L
PREALB SERPL-MCNC: 29 MG/DL
PROT SERPL-MCNC: 6.4 G/DL
PROT UR QL STRIP: NEGATIVE
RBC # BLD AUTO: 4.57 M/UL
RBC #/AREA URNS AUTO: 1 /HPF (ref 0–4)
SODIUM SERPL-SCNC: 137 MMOL/L
SODIUM SERPL-SCNC: 139 MMOL/L
SP GR UR STRIP: 1.01 (ref 1–1.03)
SQUAMOUS #/AREA URNS AUTO: 0 /HPF
URN SPEC COLLECT METH UR: ABNORMAL
UROBILINOGEN UR STRIP-ACNC: NEGATIVE EU/DL
WBC # BLD AUTO: 12.33 K/UL
WBC #/AREA URNS AUTO: 1 /HPF (ref 0–5)
YEAST UR QL AUTO: NORMAL

## 2017-03-21 PROCEDURE — 86920 COMPATIBILITY TEST SPIN: CPT

## 2017-03-21 PROCEDURE — 80048 BASIC METABOLIC PNL TOTAL CA: CPT

## 2017-03-21 PROCEDURE — 76942 ECHO GUIDE FOR BIOPSY: CPT | Mod: 26,,, | Performed by: ANESTHESIOLOGY

## 2017-03-21 PROCEDURE — 86850 RBC ANTIBODY SCREEN: CPT | Mod: 91

## 2017-03-21 PROCEDURE — 25000003 PHARM REV CODE 250: Performed by: HOSPITALIST

## 2017-03-21 PROCEDURE — 0QH734Z INSERTION OF INTERNAL FIXATION DEVICE INTO LEFT UPPER FEMUR, PERCUTANEOUS APPROACH: ICD-10-PCS | Performed by: ORTHOPAEDIC SURGERY

## 2017-03-21 PROCEDURE — 12000002 HC ACUTE/MED SURGE SEMI-PRIVATE ROOM

## 2017-03-21 PROCEDURE — 37000009 HC ANESTHESIA EA ADD 15 MINS: Performed by: ORTHOPAEDIC SURGERY

## 2017-03-21 PROCEDURE — 63600175 PHARM REV CODE 636 W HCPCS: Performed by: HOSPITALIST

## 2017-03-21 PROCEDURE — 85025 COMPLETE CBC W/AUTO DIFF WBC: CPT

## 2017-03-21 PROCEDURE — 86900 BLOOD TYPING SEROLOGIC ABO: CPT | Mod: 91

## 2017-03-21 PROCEDURE — 36000710: Performed by: ORTHOPAEDIC SURGERY

## 2017-03-21 PROCEDURE — 99223 1ST HOSP IP/OBS HIGH 75: CPT | Mod: ,,, | Performed by: HOSPITALIST

## 2017-03-21 PROCEDURE — 93010 ELECTROCARDIOGRAM REPORT: CPT | Mod: ,,, | Performed by: INTERNAL MEDICINE

## 2017-03-21 PROCEDURE — 84134 ASSAY OF PREALBUMIN: CPT

## 2017-03-21 PROCEDURE — D9220A PRA ANESTHESIA: Mod: ANES,,, | Performed by: ANESTHESIOLOGY

## 2017-03-21 PROCEDURE — 63600175 PHARM REV CODE 636 W HCPCS

## 2017-03-21 PROCEDURE — 63600175 PHARM REV CODE 636 W HCPCS: Performed by: ANESTHESIOLOGY

## 2017-03-21 PROCEDURE — 25000003 PHARM REV CODE 250: Performed by: ANESTHESIOLOGY

## 2017-03-21 PROCEDURE — 86850 RBC ANTIBODY SCREEN: CPT

## 2017-03-21 PROCEDURE — 27200671 HC STIMUCATH NEEDLE/ CATHETER: Performed by: ANESTHESIOLOGY

## 2017-03-21 PROCEDURE — 25000003 PHARM REV CODE 250: Performed by: NURSE ANESTHETIST, CERTIFIED REGISTERED

## 2017-03-21 PROCEDURE — 71000033 HC RECOVERY, INTIAL HOUR: Performed by: ORTHOPAEDIC SURGERY

## 2017-03-21 PROCEDURE — 87086 URINE CULTURE/COLONY COUNT: CPT

## 2017-03-21 PROCEDURE — 94761 N-INVAS EAR/PLS OXIMETRY MLT: CPT

## 2017-03-21 PROCEDURE — 85014 HEMATOCRIT: CPT

## 2017-03-21 PROCEDURE — 64447 NJX AA&/STRD FEMORAL NRV IMG: CPT | Mod: 59,LT,, | Performed by: ANESTHESIOLOGY

## 2017-03-21 PROCEDURE — 63600175 PHARM REV CODE 636 W HCPCS: Performed by: NURSE ANESTHETIST, CERTIFIED REGISTERED

## 2017-03-21 PROCEDURE — 36000711: Performed by: ORTHOPAEDIC SURGERY

## 2017-03-21 PROCEDURE — 27201423 OPTIME MED/SURG SUP & DEVICES STERILE SUPPLY: Performed by: ORTHOPAEDIC SURGERY

## 2017-03-21 PROCEDURE — 25000003 PHARM REV CODE 250: Performed by: ORTHOPAEDIC SURGERY

## 2017-03-21 PROCEDURE — 99285 EMERGENCY DEPT VISIT HI MDM: CPT | Mod: 25

## 2017-03-21 PROCEDURE — 76942 ECHO GUIDE FOR BIOPSY: CPT | Performed by: ANESTHESIOLOGY

## 2017-03-21 PROCEDURE — 71000039 HC RECOVERY, EACH ADD'L HOUR: Performed by: ORTHOPAEDIC SURGERY

## 2017-03-21 PROCEDURE — 81001 URINALYSIS AUTO W/SCOPE: CPT

## 2017-03-21 PROCEDURE — 63600175 PHARM REV CODE 636 W HCPCS: Performed by: EMERGENCY MEDICINE

## 2017-03-21 PROCEDURE — 63600175 PHARM REV CODE 636 W HCPCS: Performed by: ORTHOPAEDIC SURGERY

## 2017-03-21 PROCEDURE — 27000221 HC OXYGEN, UP TO 24 HOURS

## 2017-03-21 PROCEDURE — 83036 HEMOGLOBIN GLYCOSYLATED A1C: CPT

## 2017-03-21 PROCEDURE — 85018 HEMOGLOBIN: CPT

## 2017-03-21 PROCEDURE — 37000008 HC ANESTHESIA 1ST 15 MINUTES: Performed by: ORTHOPAEDIC SURGERY

## 2017-03-21 PROCEDURE — 80053 COMPREHEN METABOLIC PANEL: CPT

## 2017-03-21 PROCEDURE — 99285 EMERGENCY DEPT VISIT HI MDM: CPT | Mod: ,,, | Performed by: EMERGENCY MEDICINE

## 2017-03-21 PROCEDURE — C1769 GUIDE WIRE: HCPCS | Performed by: ORTHOPAEDIC SURGERY

## 2017-03-21 PROCEDURE — 36415 COLL VENOUS BLD VENIPUNCTURE: CPT

## 2017-03-21 PROCEDURE — 27245 TREAT THIGH FRACTURE: CPT | Mod: LT,,, | Performed by: ORTHOPAEDIC SURGERY

## 2017-03-21 PROCEDURE — 0QS7XZZ REPOSITION LEFT UPPER FEMUR, EXTERNAL APPROACH: ICD-10-PCS | Performed by: ORTHOPAEDIC SURGERY

## 2017-03-21 PROCEDURE — C1713 ANCHOR/SCREW BN/BN,TIS/BN: HCPCS | Performed by: ORTHOPAEDIC SURGERY

## 2017-03-21 PROCEDURE — 82962 GLUCOSE BLOOD TEST: CPT

## 2017-03-21 PROCEDURE — 86900 BLOOD TYPING SEROLOGIC ABO: CPT

## 2017-03-21 PROCEDURE — 96372 THER/PROPH/DIAG INJ SC/IM: CPT

## 2017-03-21 PROCEDURE — D9220A PRA ANESTHESIA: Mod: CRNA,,, | Performed by: NURSE ANESTHETIST, CERTIFIED REGISTERED

## 2017-03-21 PROCEDURE — 96374 THER/PROPH/DIAG INJ IV PUSH: CPT

## 2017-03-21 DEVICE — IMPLANTABLE DEVICE: Type: IMPLANTABLE DEVICE | Site: FEMUR | Status: FUNCTIONAL

## 2017-03-21 RX ORDER — PREGABALIN 75 MG/1
75 CAPSULE ORAL NIGHTLY
Status: DISCONTINUED | OUTPATIENT
Start: 2017-03-21 | End: 2017-03-24 | Stop reason: HOSPADM

## 2017-03-21 RX ORDER — ONDANSETRON 2 MG/ML
4 INJECTION INTRAMUSCULAR; INTRAVENOUS DAILY PRN
Status: DISCONTINUED | OUTPATIENT
Start: 2017-03-21 | End: 2017-03-21

## 2017-03-21 RX ORDER — RAMELTEON 8 MG/1
8 TABLET ORAL NIGHTLY PRN
Status: DISCONTINUED | OUTPATIENT
Start: 2017-03-21 | End: 2017-03-24 | Stop reason: HOSPADM

## 2017-03-21 RX ORDER — SODIUM CHLORIDE 9 MG/ML
INJECTION, SOLUTION INTRAVENOUS CONTINUOUS
Status: DISCONTINUED | OUTPATIENT
Start: 2017-03-21 | End: 2017-03-21

## 2017-03-21 RX ORDER — ENOXAPARIN SODIUM 100 MG/ML
40 INJECTION SUBCUTANEOUS
Status: DISCONTINUED | OUTPATIENT
Start: 2017-03-21 | End: 2017-03-24 | Stop reason: HOSPADM

## 2017-03-21 RX ORDER — LISINOPRIL 20 MG/1
40 TABLET ORAL DAILY
Status: DISCONTINUED | OUTPATIENT
Start: 2017-03-21 | End: 2017-03-24 | Stop reason: HOSPADM

## 2017-03-21 RX ORDER — IBUPROFEN 200 MG
16 TABLET ORAL
Status: DISCONTINUED | OUTPATIENT
Start: 2017-03-21 | End: 2017-03-23

## 2017-03-21 RX ORDER — FENTANYL CITRATE 50 UG/ML
INJECTION, SOLUTION INTRAMUSCULAR; INTRAVENOUS
Status: DISCONTINUED | OUTPATIENT
Start: 2017-03-21 | End: 2017-03-21

## 2017-03-21 RX ORDER — CEFAZOLIN SODIUM 2 G/50ML
2 SOLUTION INTRAVENOUS
Status: COMPLETED | OUTPATIENT
Start: 2017-03-21 | End: 2017-03-22

## 2017-03-21 RX ORDER — SODIUM CHLORIDE 0.9 % (FLUSH) 0.9 %
3 SYRINGE (ML) INJECTION EVERY 8 HOURS
Status: DISCONTINUED | OUTPATIENT
Start: 2017-03-21 | End: 2017-03-21

## 2017-03-21 RX ORDER — SUCCINYLCHOLINE CHLORIDE 20 MG/ML
INJECTION INTRAMUSCULAR; INTRAVENOUS
Status: DISCONTINUED | OUTPATIENT
Start: 2017-03-21 | End: 2017-03-21

## 2017-03-21 RX ORDER — GLUCAGON 1 MG
1 KIT INJECTION
Status: DISCONTINUED | OUTPATIENT
Start: 2017-03-21 | End: 2017-03-23

## 2017-03-21 RX ORDER — ACETAMINOPHEN 325 MG/1
650 TABLET ORAL EVERY 4 HOURS PRN
Status: DISCONTINUED | OUTPATIENT
Start: 2017-03-21 | End: 2017-03-24 | Stop reason: HOSPADM

## 2017-03-21 RX ORDER — ROCURONIUM BROMIDE 10 MG/ML
INJECTION, SOLUTION INTRAVENOUS
Status: DISCONTINUED | OUTPATIENT
Start: 2017-03-21 | End: 2017-03-21

## 2017-03-21 RX ORDER — MECLIZINE HCL 12.5 MG 12.5 MG/1
12.5 TABLET ORAL 3 TIMES DAILY PRN
Status: DISCONTINUED | OUTPATIENT
Start: 2017-03-21 | End: 2017-03-24 | Stop reason: HOSPADM

## 2017-03-21 RX ORDER — HALOPERIDOL 5 MG/ML
INJECTION INTRAMUSCULAR
Status: COMPLETED
Start: 2017-03-21 | End: 2017-03-21

## 2017-03-21 RX ORDER — SIMVASTATIN 20 MG/1
40 TABLET, FILM COATED ORAL DAILY
Status: DISCONTINUED | OUTPATIENT
Start: 2017-03-21 | End: 2017-03-21

## 2017-03-21 RX ORDER — POLYETHYLENE GLYCOL 3350 17 G/17G
17 POWDER, FOR SOLUTION ORAL DAILY
Status: DISCONTINUED | OUTPATIENT
Start: 2017-03-21 | End: 2017-03-24 | Stop reason: HOSPADM

## 2017-03-21 RX ORDER — SODIUM CHLORIDE 0.9 % (FLUSH) 0.9 %
3 SYRINGE (ML) INJECTION
Status: DISCONTINUED | OUTPATIENT
Start: 2017-03-21 | End: 2017-03-21

## 2017-03-21 RX ORDER — ONDANSETRON 2 MG/ML
4 INJECTION INTRAMUSCULAR; INTRAVENOUS EVERY 12 HOURS PRN
Status: DISCONTINUED | OUTPATIENT
Start: 2017-03-21 | End: 2017-03-24 | Stop reason: HOSPADM

## 2017-03-21 RX ORDER — ACETAMINOPHEN 10 MG/ML
1000 INJECTION, SOLUTION INTRAVENOUS EVERY 8 HOURS
Status: COMPLETED | OUTPATIENT
Start: 2017-03-21 | End: 2017-03-22

## 2017-03-21 RX ORDER — PHENYLEPHRINE HYDROCHLORIDE 10 MG/ML
INJECTION INTRAVENOUS
Status: DISCONTINUED | OUTPATIENT
Start: 2017-03-21 | End: 2017-03-21

## 2017-03-21 RX ORDER — OXYCODONE HYDROCHLORIDE 5 MG/1
10 TABLET ORAL
Status: DISCONTINUED | OUTPATIENT
Start: 2017-03-21 | End: 2017-03-23

## 2017-03-21 RX ORDER — DOCUSATE SODIUM 100 MG/1
100 CAPSULE, LIQUID FILLED ORAL 2 TIMES DAILY
Status: DISCONTINUED | OUTPATIENT
Start: 2017-03-21 | End: 2017-03-24 | Stop reason: HOSPADM

## 2017-03-21 RX ORDER — IBUPROFEN 200 MG
24 TABLET ORAL
Status: DISCONTINUED | OUTPATIENT
Start: 2017-03-21 | End: 2017-03-23

## 2017-03-21 RX ORDER — SIMVASTATIN 40 MG/1
40 TABLET, FILM COATED ORAL NIGHTLY
Status: DISCONTINUED | OUTPATIENT
Start: 2017-03-21 | End: 2017-03-24 | Stop reason: HOSPADM

## 2017-03-21 RX ORDER — MIDAZOLAM HYDROCHLORIDE 1 MG/ML
1 INJECTION INTRAMUSCULAR; INTRAVENOUS EVERY 5 MIN PRN
Status: DISCONTINUED | OUTPATIENT
Start: 2017-03-21 | End: 2017-03-21

## 2017-03-21 RX ORDER — HALOPERIDOL 5 MG/ML
3 INJECTION INTRAMUSCULAR ONCE
Status: COMPLETED | OUTPATIENT
Start: 2017-03-21 | End: 2017-03-21

## 2017-03-21 RX ORDER — FAMOTIDINE 20 MG/1
20 TABLET, FILM COATED ORAL 2 TIMES DAILY
Status: DISCONTINUED | OUTPATIENT
Start: 2017-03-21 | End: 2017-03-24 | Stop reason: HOSPADM

## 2017-03-21 RX ORDER — HALOPERIDOL 5 MG/ML
3 INJECTION INTRAMUSCULAR ONCE
Status: DISCONTINUED | OUTPATIENT
Start: 2017-03-21 | End: 2017-03-21

## 2017-03-21 RX ORDER — INSULIN ASPART 100 [IU]/ML
0-5 INJECTION, SOLUTION INTRAVENOUS; SUBCUTANEOUS
Status: DISCONTINUED | OUTPATIENT
Start: 2017-03-21 | End: 2017-03-23

## 2017-03-21 RX ORDER — MORPHINE SULFATE 2 MG/ML
2 INJECTION, SOLUTION INTRAMUSCULAR; INTRAVENOUS
Status: COMPLETED | OUTPATIENT
Start: 2017-03-21 | End: 2017-03-21

## 2017-03-21 RX ORDER — FENTANYL CITRATE 50 UG/ML
50 INJECTION, SOLUTION INTRAMUSCULAR; INTRAVENOUS EVERY 5 MIN PRN
Status: DISCONTINUED | OUTPATIENT
Start: 2017-03-21 | End: 2017-03-21

## 2017-03-21 RX ORDER — ONDANSETRON 2 MG/ML
INJECTION INTRAMUSCULAR; INTRAVENOUS
Status: DISCONTINUED | OUTPATIENT
Start: 2017-03-21 | End: 2017-03-21

## 2017-03-21 RX ORDER — HALOPERIDOL 5 MG/ML
2 INJECTION INTRAMUSCULAR ONCE
Status: COMPLETED | OUTPATIENT
Start: 2017-03-21 | End: 2017-03-21

## 2017-03-21 RX ORDER — PROPOFOL 10 MG/ML
VIAL (ML) INTRAVENOUS
Status: DISCONTINUED | OUTPATIENT
Start: 2017-03-21 | End: 2017-03-21

## 2017-03-21 RX ORDER — LIDOCAINE HCL/PF 100 MG/5ML
SYRINGE (ML) INTRAVENOUS
Status: DISCONTINUED | OUTPATIENT
Start: 2017-03-21 | End: 2017-03-21

## 2017-03-21 RX ORDER — ACETAMINOPHEN 10 MG/ML
INJECTION, SOLUTION INTRAVENOUS
Status: DISCONTINUED | OUTPATIENT
Start: 2017-03-21 | End: 2017-03-21

## 2017-03-21 RX ORDER — OXYCODONE HYDROCHLORIDE 5 MG/1
5 TABLET ORAL EVERY 4 HOURS PRN
Status: DISCONTINUED | OUTPATIENT
Start: 2017-03-21 | End: 2017-03-23

## 2017-03-21 RX ORDER — AMOXICILLIN 250 MG
1 CAPSULE ORAL 2 TIMES DAILY
Status: DISCONTINUED | OUTPATIENT
Start: 2017-03-21 | End: 2017-03-24 | Stop reason: HOSPADM

## 2017-03-21 RX ORDER — NORTRIPTYLINE HYDROCHLORIDE 10 MG/1
10 CAPSULE ORAL NIGHTLY
Status: DISCONTINUED | OUTPATIENT
Start: 2017-03-21 | End: 2017-03-24 | Stop reason: HOSPADM

## 2017-03-21 RX ORDER — FENTANYL CITRATE 50 UG/ML
25 INJECTION, SOLUTION INTRAMUSCULAR; INTRAVENOUS EVERY 5 MIN PRN
Status: DISCONTINUED | OUTPATIENT
Start: 2017-03-21 | End: 2017-03-21 | Stop reason: HOSPADM

## 2017-03-21 RX ADMIN — HALOPERIDOL LACTATE 2 MG: 5 INJECTION, SOLUTION INTRAMUSCULAR at 06:03

## 2017-03-21 RX ADMIN — PREGABALIN 75 MG: 75 CAPSULE ORAL at 09:03

## 2017-03-21 RX ADMIN — HALOPERIDOL 2 MG: 5 INJECTION INTRAMUSCULAR at 06:03

## 2017-03-21 RX ADMIN — SODIUM CHLORIDE: 0.9 INJECTION, SOLUTION INTRAVENOUS at 02:03

## 2017-03-21 RX ADMIN — PHENYLEPHRINE HYDROCHLORIDE 100 MCG: 10 INJECTION INTRAVENOUS at 03:03

## 2017-03-21 RX ADMIN — ACETAMINOPHEN 1000 MG: 10 INJECTION, SOLUTION INTRAVENOUS at 09:03

## 2017-03-21 RX ADMIN — FENTANYL CITRATE 25 MCG: 50 INJECTION INTRAMUSCULAR; INTRAVENOUS at 05:03

## 2017-03-21 RX ADMIN — SUCCINYLCHOLINE CHLORIDE 100 MG: 20 INJECTION, SOLUTION INTRAMUSCULAR; INTRAVENOUS at 02:03

## 2017-03-21 RX ADMIN — FENTANYL CITRATE 50 MCG: 50 INJECTION INTRAMUSCULAR; INTRAVENOUS at 02:03

## 2017-03-21 RX ADMIN — FAMOTIDINE 20 MG: 20 TABLET, FILM COATED ORAL at 08:03

## 2017-03-21 RX ADMIN — LISINOPRIL 40 MG: 20 TABLET ORAL at 08:03

## 2017-03-21 RX ADMIN — ONDANSETRON 4 MG: 2 INJECTION INTRAMUSCULAR; INTRAVENOUS at 04:03

## 2017-03-21 RX ADMIN — FENTANYL CITRATE 50 MCG: 50 INJECTION, SOLUTION INTRAMUSCULAR; INTRAVENOUS at 02:03

## 2017-03-21 RX ADMIN — FENTANYL CITRATE 25 MCG: 50 INJECTION INTRAMUSCULAR; INTRAVENOUS at 02:03

## 2017-03-21 RX ADMIN — INSULIN ASPART 2 UNITS: 100 INJECTION, SOLUTION INTRAVENOUS; SUBCUTANEOUS at 08:03

## 2017-03-21 RX ADMIN — STANDARDIZED SENNA CONCENTRATE AND DOCUSATE SODIUM 1 TABLET: 8.6; 5 TABLET, FILM COATED ORAL at 08:03

## 2017-03-21 RX ADMIN — PROPOFOL 100 MG: 10 INJECTION, EMULSION INTRAVENOUS at 02:03

## 2017-03-21 RX ADMIN — MORPHINE SULFATE 2 MG: 2 INJECTION, SOLUTION INTRAMUSCULAR; INTRAVENOUS at 05:03

## 2017-03-21 RX ADMIN — HALOPERIDOL LACTATE 3 MG: 5 INJECTION, SOLUTION INTRAMUSCULAR at 06:03

## 2017-03-21 RX ADMIN — ROCURONIUM BROMIDE 10 MG: 10 INJECTION, SOLUTION INTRAVENOUS at 02:03

## 2017-03-21 RX ADMIN — FENTANYL CITRATE 25 MCG: 50 INJECTION INTRAMUSCULAR; INTRAVENOUS at 08:03

## 2017-03-21 RX ADMIN — CEFAZOLIN SODIUM 2 G: 2 SOLUTION INTRAVENOUS at 11:03

## 2017-03-21 RX ADMIN — ACETAMINOPHEN 1000 MG: 10 INJECTION, SOLUTION INTRAVENOUS at 04:03

## 2017-03-21 RX ADMIN — FAMOTIDINE 20 MG: 20 TABLET, FILM COATED ORAL at 09:03

## 2017-03-21 RX ADMIN — INSULIN ASPART 1 UNITS: 100 INJECTION, SOLUTION INTRAVENOUS; SUBCUTANEOUS at 07:03

## 2017-03-21 RX ADMIN — OXYCODONE HYDROCHLORIDE 5 MG: 5 TABLET ORAL at 08:03

## 2017-03-21 RX ADMIN — LIDOCAINE HYDROCHLORIDE 60 MG: 20 INJECTION, SOLUTION INTRAVENOUS at 02:03

## 2017-03-21 RX ADMIN — Medication 2 G: at 03:03

## 2017-03-21 NOTE — ANESTHESIA PREPROCEDURE EVALUATION
03/21/2017  Zeina White is a 89 y.o., female.  female with PMHx of HTN, DM2 who presents with chief complaint of left hip pain s/p fall from standing height this morning. Patient lives at home with her 3 daughters. She typically walks with a cane or walker, but not in the house. She denies LOC or any other injuries.     Pre-operative evaluation for Procedure(s) (LRB):  IM NAILING OF FEMUR PROFX TABLE -- SYNTHES  (Left)      Patient Active Problem List   Diagnosis    DJD (degenerative joint disease)    GERD (gastroesophageal reflux disease)    Vertigo    Irritable bowel    Dementia    Tremor, essential    Hearing difficulty    Hyperlipidemia    Essential hypertension    Type II diabetes mellitus with neurological manifestations    Hearing loss    Depression, major    Sebaceous cyst    Syncope    Injury of left rotator cuff    Closed basicervical fracture of left femur       Review of patient's allergies indicates:   Allergen Reactions    Naproxen      Other reaction(s): Jittery       No current facility-administered medications on file prior to encounter.      Current Outpatient Prescriptions on File Prior to Encounter   Medication Sig Dispense Refill    BLOOD SUGAR DIAGNOSTIC (ONE TOUCH ULTRA TEST MISC) Once.      blood sugar diagnostic (ONETOUCH ULTRA TEST) Strp Check sugars once daily 250.00 100 strip 12    diphenoxylate-atropine 2.5-0.025 mg (LOMOTIL) 2.5-0.025 mg per tablet TAKE 1 TABLET BY MOUTH EVERY DAY 30 tablet 0    lancets (ONETOUCH DELICA LANCETS) 33 gauge Misc 1 lancet by Misc.(Non-Drug; Combo Route) route Daily. 100 each 11    lisinopril (PRINIVIL,ZESTRIL) 40 MG tablet TAKE 1 TABLET(40 MG) BY MOUTH EVERY DAY 90 tablet 3    meclizine (ANTIVERT) 12.5 mg tablet Take 1 tablet (12.5 mg total) by mouth 3 (three) times daily as needed for Dizziness. 20 tablet 0    metformin  (GLUCOPHAGE) 500 MG tablet TK 1 T PO BID WITH MEALS  2    nortriptyline (PAMELOR) 10 MG capsule Take 1 capsule (10 mg total) by mouth every evening. 90 capsule 11    ONE TOUCH DELICA LANCETS 33 gauge Misc       primidone (MYSOLINE) 50 MG Tab 1/4 tab q hs 30 tablet 12    ranitidine (ZANTAC) 300 MG tablet TAKE 1 TABLET BY MOUTH EVERY EVENING 90 tablet 3    simvastatin (ZOCOR) 40 MG tablet TAKE 1 TABLET BY MOUTH ONCE DAILY 90 tablet 0    sulindac (CLINORIL) 150 MG tablet Take 1 tablet (150 mg total) by mouth 2 (two) times daily. (Patient taking differently: Take 150 mg by mouth 2 (two) times daily as needed. ) 14 tablet 0       Past Surgical History:   Procedure Laterality Date    HYSTERECTOMY      JOINT REPLACEMENT      L total knee    KNEE SURGERY      bilat    QUADRICEPS REPAIR      R       Social History     Social History    Marital status:      Spouse name: N/A    Number of children: N/A    Years of education: N/A     Occupational History    Not on file.     Social History Main Topics    Smoking status: Never Smoker    Smokeless tobacco: Never Used    Alcohol use No    Drug use: No    Sexual activity: No     Other Topics Concern    Not on file     Social History Narrative         Vital Signs Range (Last 24H):  Temp:  [36.7 °C (98.1 °F)-36.9 °C (98.4 °F)]   Pulse:  [82-98]   Resp:  [12-20]   BP: (162-198)/(68-95)   SpO2:  [91 %-99 %]       CBC:   Recent Labs      03/21/17   0544   WBC  12.33   RBC  4.57   HGB  13.4   HCT  41.1   PLT  179   MCV  90   MCH  29.3   MCHC  32.6       CMP:   Recent Labs      03/21/17   0544   NA  139   K  3.6   CL  101   CO2  26   BUN  15   CREATININE  0.7   GLU  281*   CALCIUM  8.8   ALBUMIN  3.4*   PROT  6.4   ALKPHOS  82   ALT  16   AST  16   BILITOT  0.5       INR  No results for input(s): INR, PROTIME, APTT in the last 72 hours.    Invalid input(s): PT        Diagnostic Studies:      EKG:  Normal sinus rhythm  Left axis deviation  Abnormal ECG  When  compared with ECG of 05-SEP-2015 09:25,  Incomplete right bundle branch block is no longer Present  Confirmed by ABDIAS WATKINS, HOMEYAR ([id number]) on 1/27/2017 1:49:36 PM  25mm/s 10mm/mV 150Hz 8.0 SP2 12SL 241 OPHELIA: 0  Referred by: SELF REFERRAL        PRE-TEST DATA   EKG: Resting electrocardiogram reveals sinus rhythm with left anterior fascicular block at a rate of 68 bpm.     TEST DESCRIPTION   The patient received 0.4 mg of Regadenoson, achieving a peak heart rate of 90 bpm, which is 70% of the age predicted maximum heart rate. . Infusion was terminated secondary to completed protocol.     EKG Conclusions:    1. The EKG portion of this study is negative for ischemia at a peak heart rate of 90 bpm (70% of predicted).   2. Sensitivity is impaired due to failure to reach target heart rate.   3. Blood pressure remained stable throughout the protocol  (Presenting BP: 149/89 Peak BP: 141/89).   4. The following arrhythmias were present: rare PVCs.   5. There were no symptoms of chest discomfort or significant dyspnea throughout the protocol.   6. Nuclear portion of this study will be reported seperately.      This document has been electronically    SIGNED BY: Keri Teran MD On: 09/04/2015 12:19      Specimen Collected: 09/04/15  9:00 AM Last Resulted: 09/04/15 12:24 PM              [facility] Anesthesia Evaluation    I have reviewed the Patient Summary Reports.    I have reviewed the Nursing Notes.      Review of Systems  Anesthesia Hx:  No problems with previous Anesthesia    Hematology/Oncology:  Hematology Normal   Oncology Normal     EENT/Dental:EENT/Dental Normal   Cardiovascular:   Hypertension    Pulmonary:  Pulmonary Normal    Renal/:  Renal/ Normal     Hepatic/GI:   GERD    Musculoskeletal:   Arthritis     Neurological:  Neurology Normal    Endocrine:   Diabetes    Dermatological:  Skin Normal    Psych:   Psychiatric History          Physical Exam  General:  Well nourished    Airway/Jaw/Neck:  Airway Findings: Mouth  Opening: Small, but > 3cm Tongue: Large  Mallampati: III  Improves to II with phonation.  TM Distance: Normal, at least 6 cm        Eyes/Ears/Nose:  EYES/EARS/NOSE FINDINGS: Normal   Dental:  DENTAL FINDINGS: Normal   Chest/Lungs:  Chest/Lungs Findings: Normal Respiratory Rate     Heart/Vascular:  Heart Findings: Rate: Normal      Musculoskeletal:  Musculoskeletal Findings: Normal   Skin:  Skin Findings: Normal    Mental Status:  Mental Status Findings:  Patient very hard of hearing.        Anesthesia Plan  Type of Anesthesia, risks & benefits discussed:  Anesthesia Type:  general, epidural, CSE, regional, spinal, MAC  Patient's Preference:   Intra-op Monitoring Plan:   Intra-op Monitoring Plan Comments:   Post Op Pain Control Plan:   Post Op Pain Control Plan Comments:   Induction:    Beta Blocker:  Patient is not currently on a Beta-Blocker (No further documentation required).       Informed Consent: Patient representative understands risks and agrees with Anesthesia plan.  Questions answered. Anesthesia consent signed with patient.  ASA Score: 3     Day of Surgery Review of History & Physical:            Ready For Surgery From Anesthesia Perspective.

## 2017-03-21 NOTE — PLAN OF CARE
Non-Emergent Surgery    Active cardiac issues:  Decompensated heart failure? No   Unstable angina No   Significant arrhythmias? No   Severe valvular heart disease? No   Recent MI < 30 days? No     Functional mets <4    < 4 METs -unable to walk > 2 blocks on level ground without stopping due to symptoms  - eating, dressing, toileting, walking indoors, light housework. POOR   > 4 METs -climbing > 1 flight of stairs without stopping  -walking up hill > 1-2 blocks  -scrubbing floors  -moving furniture  - golf, bowling, dancing or tennis  -running short distance MODERATE to EXCELLENT     Cardiac Clinical Risk Factors  High risk surgery? No   History of CAD/ischemic heart disease? No   History of cerebrovascular disease? No   History of compensated heart failure? No   Type 2 diabetes requiring insulin? Yes   Serum Creatinine > 2? No   Total cardiac risk factors 1     Pt is intermediate risk for an intermediate risk surgery. Pt is medically optimized at this time.   Would recommend proceeding as planned.      Full H&P to follow

## 2017-03-21 NOTE — SUBJECTIVE & OBJECTIVE
Past Medical History:   Diagnosis Date    Anxiety     Atypical chest pain     Dementia     Diabetes mellitus     DJD (degenerative joint disease)     GERD (gastroesophageal reflux disease)     Hyperlipidemia     Hypertension     Irritable bowel     Tremor, essential     Vertigo        Past Surgical History:   Procedure Laterality Date    HYSTERECTOMY      JOINT REPLACEMENT      L total knee    KNEE SURGERY      bilat    QUADRICEPS REPAIR      R       Review of patient's allergies indicates:   Allergen Reactions    Naproxen      Other reaction(s): Jittery       No current facility-administered medications on file prior to encounter.      Current Outpatient Prescriptions on File Prior to Encounter   Medication Sig    BLOOD SUGAR DIAGNOSTIC (ONE TOUCH ULTRA TEST MISC) Once.    blood sugar diagnostic (ONETOUCH ULTRA TEST) Strp Check sugars once daily 250.00    diphenoxylate-atropine 2.5-0.025 mg (LOMOTIL) 2.5-0.025 mg per tablet TAKE 1 TABLET BY MOUTH EVERY DAY    lancets (ONETOUCH DELICA LANCETS) 33 gauge Misc 1 lancet by Misc.(Non-Drug; Combo Route) route Daily.    lisinopril (PRINIVIL,ZESTRIL) 40 MG tablet TAKE 1 TABLET(40 MG) BY MOUTH EVERY DAY    meclizine (ANTIVERT) 12.5 mg tablet Take 1 tablet (12.5 mg total) by mouth 3 (three) times daily as needed for Dizziness.    metformin (GLUCOPHAGE) 500 MG tablet TK 1 T PO BID WITH MEALS    nortriptyline (PAMELOR) 10 MG capsule Take 1 capsule (10 mg total) by mouth every evening.    ONE TOUCH DELICA LANCETS 33 gauge Misc     primidone (MYSOLINE) 50 MG Tab 1/4 tab q hs    ranitidine (ZANTAC) 300 MG tablet TAKE 1 TABLET BY MOUTH EVERY EVENING    simvastatin (ZOCOR) 40 MG tablet TAKE 1 TABLET BY MOUTH ONCE DAILY    sulindac (CLINORIL) 150 MG tablet Take 1 tablet (150 mg total) by mouth 2 (two) times daily. (Patient taking differently: Take 150 mg by mouth 2 (two) times daily as needed. )     Family History     Problem Relation (Age of Onset)     Cancer Mother    Diabetes Father        Social History Main Topics    Smoking status: Never Smoker    Smokeless tobacco: Never Used    Alcohol use No    Drug use: No    Sexual activity: No     Review of Systems   Constitutional: Negative for chills and fever.   HENT: Negative for sore throat and voice change.    Eyes: Negative for redness and visual disturbance.   Respiratory: Negative for cough, shortness of breath and wheezing.    Cardiovascular: Negative for chest pain and palpitations.   Gastrointestinal: Negative for abdominal pain, constipation, diarrhea and nausea.   Genitourinary: Negative for difficulty urinating, dysuria and frequency.   Musculoskeletal: Negative for gait problem.   Skin: Negative for rash and wound.   Neurological: Negative for dizziness, syncope and light-headedness.   Psychiatric/Behavioral: Negative for confusion and hallucinations.     Objective:     Vital Signs (Most Recent):  Temp: 98.9 °F (37.2 °C) (03/21/17 1713)  Pulse: 106 (03/21/17 1800)  Resp: (!) 56 (03/21/17 1800)  BP:  (pt agitated; refused BP) (03/21/17 1800)  SpO2: (!) 94 % (03/21/17 1800) Vital Signs (24h Range):  Temp:  [98.1 °F (36.7 °C)-98.9 °F (37.2 °C)] 98.9 °F (37.2 °C)  Pulse:  [] 106  Resp:  [12-56] 56  SpO2:  [91 %-100 %] 94 %  BP: (106-198)/() 186/91     Weight: 68 kg (149 lb 14.6 oz)  Body mass index is 28.33 kg/(m^2).    Physical Exam   Constitutional: She is oriented to person, place, and time. She appears well-developed and well-nourished. No distress.   HENT:   Head: Normocephalic and atraumatic.   Eyes: Right eye exhibits no discharge. Left eye exhibits no discharge. No scleral icterus.   Neck: Normal range of motion. Neck supple. No JVD present. No thyromegaly present.   Cardiovascular: Normal rate and regular rhythm.    No murmur heard.  Pulmonary/Chest: Effort normal and breath sounds normal. She has no wheezes.   Abdominal: Soft. Bowel sounds are normal. She exhibits no distension.  There is no tenderness.   Musculoskeletal: She exhibits tenderness (to left hip).   Lymphadenopathy:     She has no cervical adenopathy.   Neurological: She is alert and oriented to person, place, and time.   Skin: Skin is warm and dry. No rash noted.   Psychiatric: She has a normal mood and affect. Her behavior is normal.

## 2017-03-21 NOTE — ED TRIAGE NOTES
Pt presents to ED via EMS after falling at home today. Pt's daughter stated that pt fell from standing position and landed on her left hip. Pt currently complaining of pain to left hip at this time.     LOC: Patient name and date of birth verified.  The patient is awake, alert and aware of environment with an appropriate affect, the patient is oriented x 3 and speaking appropriately.  Pt in NAD.    APPEARANCE: Patient resting comfortably and in no acute distress, patient is clean and well groomed, patient's clothing is properly fastened.  SKIN: The skin is warm and dry, color consistent with ethnicity, patient has normal skin turgor and moist mucus membranes, skin intact, no breakdown or brusing noted.  MUSCULOSKELETAL: Patient moving all extremities well, no obvious swelling or deformities noted.  RESPIRATORY: Airway is open and patent, respirations are spontaneous, patient has a normal effort and rate, no accessory muscle use noted.  CARDIAC: Patient has a normal rate and rhythm, no periphreal edema noted, capillary refill < 3 seconds.  ABDOMEN: Soft and non tender to palpation, no distention noted. Bowel sounds present in all four quadrants.  NEUROLOGIC: Eyes open spontaneously, behavior appropriate to situation, follows commands, facial expression symmetrical, bilateral hand grasp equal and even, purposeful motor response noted, normal sensation in all extremities when touched with a finger.

## 2017-03-21 NOTE — ASSESSMENT & PLAN NOTE
Encounter for aftercare for healing closed traumatic fracture of lower extremity  Pt was admitted to [facility] and medically optimized.  Ortho was consulted and the pt underwent cephalomedullary nail fixation of left basicervical femoral neck fracture by Dr Presley Cornejo on 3/21/2017.  Post op pt is WBAT to LLE per ortho recs. She will start PT/OT tomorrow as well as Lovenox for DVT ppx.

## 2017-03-21 NOTE — ED NOTES
Pt is AA&O times four.  Pt has hearing aide on at present  Daughter at bedside  Resp full and reg Blood cath in place draining yellow colored urine to  bag

## 2017-03-21 NOTE — PROGRESS NOTES
1715: Pt inadvertently pulled out PIV. 22g PIV started to left hand by Dr. Tyson with anesthesia.    1752: Pt inadvertently removed second IV. Pt agitated and combative. RN unable to obtain BPs. Daughter at bedside attempting to calm pt. RN unable to start new PIV. Notified Dr. Kim with anesthesia. MD states she will come by to assess pt.    1815: Pt remains agitated. Daughter requesting IM haldol to help calm pt. Orders received for 2mg IM haldol per Dr. Kim. Orders implemented; see MAR.    1830: Dr. Kim at bedside with pt. Orders received to give remaining 3mg IM haldol; orders carried out; see MAR. Pt's daughter remains at bedside and aware of plan of care.

## 2017-03-21 NOTE — PROGRESS NOTES
Clarified lovenox order with Dr. Nath. Ok to hold 1830 dose. Medication to be restarted tomorrow, 3/22.

## 2017-03-21 NOTE — H&P
[facility] Hospital Medicine  History & Physical    Patient Name: Zeina White  MRN: [id number]  Admission Date: 3/21/2017  Attending Physician: Deborah Castle MD   Primary Care Provider: Alan Kaplan MD    [facility] Medicine Team: [facility] Deborah Castle MD     Patient information was obtained from patient, relative(s), past medical records and ER records.     Subjective:     Principal Problem:Closed basicervical fracture of left femur    Chief Complaint:   Chief Complaint   Patient presents with    Fall     pt presents to the ed following a fall pt has rotation noted to left leg pt was given 175mg pf fentnyl pta         HPI: Zeina White is a 89 y.o. female with a PMH of HTN, DM2, and dementia. The patient presented to [facility] on 3/21/2017 due to pain in her left hip following a fall in her bedroom.  Pt states she got up in the middle of the night because she thought she heard voices and wanted to see who was there. She denies hitting her head or any loss of consciousness. Imaging in the ER revealed a left femoral neck fracture.     Pt lives with her daughter and is very [place]. She is able to ambulate around the house, but uses a cane or walker for when they leave the house. She is able to feed herself, but requires assistance with her other ADLs.  She denies any history of heart disease or previous heart attack.  No history of chest pains.         Past Medical History:   Diagnosis Date    Anxiety     Atypical chest pain     Dementia     Diabetes mellitus     DJD (degenerative joint disease)     GERD (gastroesophageal reflux disease)     Hyperlipidemia     Hypertension     Irritable bowel     Tremor, essential     Vertigo        Past Surgical History:   Procedure Laterality Date    HYSTERECTOMY      JOINT REPLACEMENT      L total knee    KNEE SURGERY      bilat    QUADRICEPS REPAIR      R       Review of patient's allergies indicates:   Allergen  Reactions    Naproxen      Other reaction(s): Jittery       No current facility-administered medications on file prior to encounter.      Current Outpatient Prescriptions on File Prior to Encounter   Medication Sig    BLOOD SUGAR DIAGNOSTIC (ONE TOUCH ULTRA TEST MISC) Once.    blood sugar diagnostic (ONETOUCH ULTRA TEST) Strp Check sugars once daily 250.00    diphenoxylate-atropine 2.5-0.025 mg (LOMOTIL) 2.5-0.025 mg per tablet TAKE 1 TABLET BY MOUTH EVERY DAY    lancets (ONETOUCH DELICA LANCETS) 33 gauge Misc 1 lancet by Misc.(Non-Drug; Combo Route) route Daily.    lisinopril (PRINIVIL,ZESTRIL) 40 MG tablet TAKE 1 TABLET(40 MG) BY MOUTH EVERY DAY    meclizine (ANTIVERT) 12.5 mg tablet Take 1 tablet (12.5 mg total) by mouth 3 (three) times daily as needed for Dizziness.    metformin (GLUCOPHAGE) 500 MG tablet TK 1 T PO BID WITH MEALS    nortriptyline (PAMELOR) 10 MG capsule Take 1 capsule (10 mg total) by mouth every evening.    ONE TOUCH DELICA LANCETS 33 gauge Misc     primidone (MYSOLINE) 50 MG Tab 1/4 tab q hs    ranitidine (ZANTAC) 300 MG tablet TAKE 1 TABLET BY MOUTH EVERY EVENING    simvastatin (ZOCOR) 40 MG tablet TAKE 1 TABLET BY MOUTH ONCE DAILY    sulindac (CLINORIL) 150 MG tablet Take 1 tablet (150 mg total) by mouth 2 (two) times daily. (Patient taking differently: Take 150 mg by mouth 2 (two) times daily as needed. )     Family History     Problem Relation (Age of Onset)    Cancer Mother    Diabetes Father        Social History Main Topics    Smoking status: Never Smoker    Smokeless tobacco: Never Used    Alcohol use No    Drug use: No    Sexual activity: No     Review of Systems   Constitutional: Negative for chills and fever.   HENT: Negative for sore throat and voice change.    Eyes: Negative for redness and visual disturbance.   Respiratory: Negative for cough, shortness of breath and wheezing.    Cardiovascular: Negative for chest pain and palpitations.   Gastrointestinal:  Negative for abdominal pain, constipation, diarrhea and nausea.   Genitourinary: Negative for difficulty urinating, dysuria and frequency.   Musculoskeletal: Negative for gait problem.   Skin: Negative for rash and wound.   Neurological: Negative for dizziness, syncope and light-headedness.   Psychiatric/Behavioral: Negative for confusion and hallucinations.     Objective:     Vital Signs (Most Recent):  Temp: 98.9 °F (37.2 °C) (03/21/17 1713)  Pulse: 106 (03/21/17 1800)  Resp: (!) 56 (03/21/17 1800)  BP:  (pt agitated; refused BP) (03/21/17 1800)  SpO2: (!) 94 % (03/21/17 1800) Vital Signs (24h Range):  Temp:  [98.1 °F (36.7 °C)-98.9 °F (37.2 °C)] 98.9 °F (37.2 °C)  Pulse:  [] 106  Resp:  [12-56] 56  SpO2:  [91 %-100 %] 94 %  BP: (106-198)/() 186/91     Weight: 68 kg (149 lb 14.6 oz)  Body mass index is 28.33 kg/(m^2).    Physical Exam   Constitutional: She is oriented to person, place, and time. She appears well-developed and well-nourished. No distress.   HENT:   Head: Normocephalic and atraumatic.   Eyes: Right eye exhibits no discharge. Left eye exhibits no discharge. No scleral icterus.   Neck: Normal range of motion. Neck supple. No JVD present. No thyromegaly present.   Cardiovascular: Normal rate and regular rhythm.    No murmur heard.  Pulmonary/Chest: Effort normal and breath sounds normal. She has no wheezes.   Abdominal: Soft. Bowel sounds are normal. She exhibits no distension. There is no tenderness.   Musculoskeletal: She exhibits tenderness (to left hip).   Lymphadenopathy:     She has no cervical adenopathy.   Neurological: She is alert and oriented to person, place, and time.   Skin: Skin is warm and dry. No rash noted.   Psychiatric: She has a normal mood and affect. Her behavior is normal.            Assessment/Plan:     * Closed basicervical fracture of left femur  Encounter for aftercare for healing closed traumatic fracture of lower extremity  Pt was admitted to [facility] and medically  optimized.  Ortho was consulted and the pt underwent cephalomedullary nail fixation of left basicervical femoral neck fracture by Dr Presley Cornejo on 3/21/2017.  Post op pt is WBAT to LLE per ortho recs. She will start PT/OT tomorrow as well as Lovenox for DVT ppx.       Type II diabetes mellitus with neurological manifestations  Hold home metformin and continue SSI.  HA1C 9.6, so will optimize therapy prior to discharge.       Essential hypertension  Continue home meds      Dementia  stable      VTE Risk Mitigation         Ordered     enoxaparin injection 40 mg  Every 24 hours (non-standard times)     Route:  Subcutaneous        03/21/17 1723     Medium Risk of VTE  Once      03/21/17 1724        Deborah Castle MD  Department of Hospital Medicine   [facility]

## 2017-03-21 NOTE — ED PROVIDER NOTES
Encounter Date: 3/21/2017    SCRIBE #1 NOTE: I, Martín Roberts, am scribing for, and in the presence of,  Dr. Hammond. I have scribed the entire note.       History     Chief Complaint   Patient presents with    Fall     pt presents to the ed following a fall pt has rotation noted to left leg pt was given 175mg pf fentnyl pta      Review of patient's allergies indicates:   Allergen Reactions    Naproxen      Other reaction(s): Jittery     The history is provided by the patient and a relative (Daughter).   Time seen by provider: 4:13 AM    This is a 89 y.o. female with active co-morbidities of DM, HTN, vertigo, and dementia who presents to the ED for evaluation of left hip pain secondary to a fall from standing prior to arrival. The patient states that she felt lightheaded, which led her to fall, but denies any palpitations, cp, or LOC. She struck her left side when she fell, and her left leg is shortened and externally rotated. She reports that the pain medication is wearing off and she is experiencing some mild left hip pain at the time of evaluation.     Past Medical History:   Diagnosis Date    Anxiety     Atypical chest pain     Dementia     Diabetes mellitus     DJD (degenerative joint disease)     GERD (gastroesophageal reflux disease)     Hyperlipidemia     Hypertension     Irritable bowel     Tremor, essential     Vertigo      Past Surgical History:   Procedure Laterality Date    HYSTERECTOMY      JOINT REPLACEMENT      L total knee    KNEE SURGERY      bilat    QUADRICEPS REPAIR      R     Family History   Problem Relation Age of Onset    Cancer Mother      kidney    Diabetes Father      Social History   Substance Use Topics    Smoking status: Never Smoker    Smokeless tobacco: Never Used    Alcohol use No     Review of Systems   Musculoskeletal:        Left hip pain.   Neurological: Positive for light-headedness. Negative for dizziness and headaches.   All other systems reviewed and  are negative.      Physical Exam   Initial Vitals   BP Pulse Resp Temp SpO2   03/21/17 0353 03/21/17 0353 03/21/17 0353 03/21/17 0353 03/21/17 0353   170/95 90 18 98.4 °F (36.9 °C) 98 %     Physical Exam  Gen/Constitutional: Interactive. No acute distress  Head: Normocephalic, Atraumatic  Neck: supple, no masses or LAD  Eyes: PERRLA, conjunctiva clear  Ears, Nose and Throat: No rhinorrhea or stridor.  Cardiac: Reg Rhythm, No murmur  Pulmonary: CTA Bilat, no wheezes, rhonchi, rales.  GI: Abdomen soft, non-tender, non-distended; no rebound or guarding  : No CVA tenderness.  Musculoskeletal: Extremities warm, well perfused, no erythema, no edema, left leg shortened and externally rotated, tenderness to palpation over the left hip, dp pulse intact on left.   Skin: No rashes  Neuro: Alert and Oriented x 3; No focal motor or sensory deficits.    Psych: Normal affect    ED Course   Procedures  Labs Reviewed   CBC W/ AUTO DIFFERENTIAL - Abnormal; Notable for the following:        Result Value    Gran # 10.5 (*)     Gran% 85.3 (*)     Lymph% 8.4 (*)     All other components within normal limits   COMPREHENSIVE METABOLIC PANEL - Abnormal; Notable for the following:     Glucose 281 (*)     Albumin 3.4 (*)     All other components within normal limits   TYPE & SCREEN     Imaging Results         X-Ray Hip 2 View Left (Final result) Result time:  03/21/17 06:15:28    Final result by Precious Arnett MD (03/21/17 06:15:28)    Impression:        Left femoral neck fracture as above.      Electronically signed by: PRECIOUS ARNETT MD  Date:     03/21/17  Time:    06:15     Narrative:    Technique: AP pelvis and crosstable lateral views of the left hip    Comparison: None.    Findings:    There is a complete, superiorly displaced basicervical fracture of the left femoral neck.  No osseous destruction.  Femoroacetabular joint spaces are well aligned.                EKG Readings: (Independently Interpreted)   EKG: NSR, no ONEYDA's or  STD's, non-specific twave pattern, no STEMI            Medical Decision Making:   History:   Old Medical Records: I decided to obtain old medical records.  Clinical Tests:  Labs Test(s) were ordered and reviewed by me.  Radiological study(s) were ordered and reviewed by me.  Medical test(s) were ordered and reviewed by me.     Patient presents after a fall after getting up out of bed. She states that she stood up and felt lightheaded. Denies palpitations, cp, headache, or sensation of the room spinning. I felt that she likely had orthostasis followed by a mechanical fall. I considered hip fracture, MI/ACS, PE, cardiac dysrhythmia, among other diagnoses. Plan to check labs, get XR of the left hip, and re-evaluate.     XR left hip noteable for left femoral neck fracture.    Ortho consulted and recomends admit to hospitalist hip fx service.  Hospitalist accepted the patient for admission.       Scribe Attestation:   Scribe #1: I performed the above scribed service and the documentation accurately describes the services I performed. I attest to the accuracy of the note.    Attending Attestation:           Physician Attestation for Scribe:  Physician Attestation Statement for Scribe #1: I, Dr. Hammond, reviewed documentation, as scribed by Martín Roberts in my presence, and it is both accurate and complete.                 ED Course     Clinical Impression:   The primary encounter diagnosis was Closed left hip fracture, initial encounter. A diagnosis of Hip pain was also pertinent to this visit.          Rene Hammond MD  03/21/17 0637

## 2017-03-21 NOTE — BRIEF OP NOTE
Preop Dx: Left basicervical/intertrochanteric femur fracture    Postop Dx: Left basicervical/intertrochanteric femur fracture    Procedure: Cephalomedullary nail fixation of left basicervical femoral neck fracture - 61729    Surgeon: Presley Cornejo M.D.    Asst:  Demetri Her M.D    Anesthesia: GETA    EBL:  100cc    IVF:  1000cc crystalloid    Implants: Synthes TFNa 340x11 with 95mm hip screw    Specimens: None    Findings: Stable fixation    Dispo:  To PACU extubated/stable.    Dict#  [id number]

## 2017-03-21 NOTE — Clinical Note
History           Chief Complaint   Patient presents with    Fall       pt presents to the ed following a fall pt has rotation noted to left leg pt was given 175mg pf fentnyl pta            This is a 89 y.o. female with active co-morbidities of DM, HTN, vertigo, and dementia who presents to the ED for evaluation of left hip pain secondary to a fall from standing prior to arrival. The patient states that she felt lightheaded, which led her to fall, but denies any palpitations, cp, or LOC. She struck her left side when she fell, and her left leg is shortened and externally rotated. She reports that the pain medication is wearing off and she is experiencing some mild left hip pain at the time of evaluation.           Past Medical History:   Diagnosis Date    Anxiety      Atypical chest pain      Dementia      Diabetes mellitus      DJD (degenerative joint disease)      GERD (gastroesophageal reflux disease)      Hyperlipidemia      Hypertension      Irritable bowel      Tremor, essential      Vertigo              Past Surgical History:   Procedure Laterality Date    HYSTERECTOMY        JOINT REPLACEMENT         L total knee    KNEE SURGERY         bilat    QUADRICEPS REPAIR         R           Review of Systems   Musculoskeletal:   Left hip pain.   Neurological: Positive for light-headedness. Negative for dizziness and headaches.   All other systems reviewed and are negative.                Physical Exam   Initial Vitals   BP Pulse Resp Temp SpO2   03/21/17 0353 03/21/17 0353 03/21/17 0353 03/21/17 0353 03/21/17 0353   170/95 90 18 98.4 °F (36.9 °C) 98 %      Physical Exam  Gen/Constitutional: Interactive. No acute distress  Head: Normocephalic, Atraumatic  Neck: supple, no masses or LAD  Eyes: PERRLA, conjunctiva clear  Ears, Nose and Throat: No rhinorrhea or stridor.  Cardiac: Reg Rhythm, No murmur  Pulmonary: CTA Bilat, no wheezes, rhonchi, rales.  GI: Abdomen soft, non-tender, non-distended; no  rebound or guarding  : No CVA tenderness.  Musculoskeletal: Extremities warm, well perfused, no erythema, no edema, left leg shortened and externally rotated, tenderness to palpation over the left hip, dp pulse intact on left.   Skin: No rashes  Neuro: Alert and Oriented x 3; No focal motor or sensory deficits.   Psych: Normal affect     ED Course   Procedures        Labs Reviewed   CBC W/ AUTO DIFFERENTIAL - Abnormal; Notable for the following:    Result Value      Gran # 10.5 (*)       Gran% 85.3 (*)       Lymph% 8.4 (*)       All other components within normal limits   COMPREHENSIVE METABOLIC PANEL - Abnormal; Notable for the following:      Glucose 281 (*)       Albumin 3.4 (*)       All other components within normal limits   TYPE & SCREEN           Imaging Results                 X-Ray Hip 2 View Left (Final result) Result time: 03/21/17 06:15:28     Final result by Precious Arnett MD (03/21/17 06:15:28)     Impression:           Left femoral neck fracture as above.        Electronically signed by: PRECIOUS ARNETT MD  Date:     03/21/17  Time:    06:15      Narrative:     Technique: AP pelvis and crosstable lateral views of the left hip     Comparison: None.     Findings:     There is a complete, superiorly displaced basicervical fracture of the left femoral neck. No osseous destruction. Femoroacetabular joint spaces are well aligned.                      EKG Readings: (Independently Interpreted)   EKG: NSR, no ONEYDA's or STD's, non-specific twave pattern, no STEMI   .      Patient presents after a fall after getting up out of bed. She states that she stood up and felt lightheaded. Denies palpitations, cp, headache, or sensation of the room spinning. I felt that she likely had orthostasis followed by a mechanical fall. I considered hip fracture, MI/ACS, PE, cardiac dysrhythmia, among other diagnoses. Plan to check labs, get XR of the left hip, and re-evaluate.      XR left hip noteable for left femoral neck  fracture.     Ortho consulted and recomends admit to hospitalist hip fx service. Hospitalist accepted the patient for admission.

## 2017-03-21 NOTE — ED NOTES
Patient identifiers verified and correct for Zeina T Estopinal.    LOC: The patient is awake, alert and aware of environment with an appropriate affect, the patient is oriented x 3 and speaking appropriately.  APPEARANCE: Patient resting comfortably and in no acute distress, patient is clean and well groomed, patient's clothing is properly fastened.  SKIN: The skin is warm and dry, color consistent with ethnicity, patient has normal skin turgor and dry mucus membranes, skin intact, no breakdown or bruising noted.  MUSCULOSKELETAL: Patient with LLE fracture, unable to move leg. Right foot rotated outward.    RESPIRATORY: Airway is open and patent, respirations are spontaneous, patient has a normal effort and rate, no accessory muscle use noted, bilateral breath sounds clear.  CARDIAC: Patient has a normal rate and regular rhythm, no periphreal edema noted, capillary refill < 3 seconds.  ABDOMEN: Soft and non tender to palpation, no distention noted, normoactive bowel sounds present in all four quadrants.  NEUROLOGIC: PERRL, 3mm bilaterally, eyes open spontaneously, behavior appropriate to situation, follows commands, facial expression symmetrical, bilateral hand grasp equal and even, purposeful motor response noted, normal sensation in all extremities when touched with a finger. Very [place]. Intermittent confusion at baseline.

## 2017-03-21 NOTE — ANESTHESIA PROCEDURE NOTES
Femoral & Lateral Femoral Cutaneous Single Shot Block    Patient location during procedure: pre-op   Block not for primary anesthetic.  Reason for block: at surgeon's request and post-op pain management   Post-op Pain Location: left hip pain  Start time: 3/21/2017 2:07 PM  Timeout: 3/21/2017 2:07 PM   End time: 3/21/2017 2:25 PM  Staffing  Anesthesiologist: SERGIO DILL  Resident/CRNA: ADRIA MAYNARD  Other anesthesia staff: BETTE KELLEY  Performed by: resident/CRNA   Preanesthetic Checklist  Completed: patient identified, site marked, surgical consent, pre-op evaluation, timeout performed, IV checked, risks and benefits discussed and monitors and equipment checked  Peripheral Block  Patient position: supine  Prep: ChloraPrep  Patient monitoring: heart rate, cardiac monitor, continuous pulse ox, continuous capnometry and frequent blood pressure checks  Block type: femoral and lateral femoral cutaneous  Laterality: left  Injection technique: single shot  Needle  Needle type: Stimuplex   Needle gauge: 22 G  Needle length: 2 in  Needle localization: anatomical landmarks and ultrasound guidance   -ultrasound image captured on disc.  Assessment  Injection assessment: negative aspiration, negative parasthesia and local visualized surrounding nerve  Paresthesia pain: none  Heart rate change: no  Slow fractionated injection: yes  Medications:  Bolus administered: 30 mL of 0.5 ropivacaine  Epinephrine added: 3.75 mcg/mL (1/300,000)  Additional Notes  VSS.  [facility] RN monitoring vitals throughout procedure.  Patient tolerated procedure well.  7cc local injected for lateral femoral cutaneous block under U/S guidance.   Pt pleasantly confused but cooperative. Very hard of hearing.

## 2017-03-22 LAB
ANION GAP SERPL CALC-SCNC: 12 MMOL/L
BACTERIA UR CULT: NO GROWTH
BASOPHILS # BLD AUTO: 0.02 K/UL
BASOPHILS NFR BLD: 0.2 %
BUN SERPL-MCNC: 14 MG/DL
CALCIUM SERPL-MCNC: 8.4 MG/DL
CHLORIDE SERPL-SCNC: 101 MMOL/L
CO2 SERPL-SCNC: 24 MMOL/L
CREAT SERPL-MCNC: 0.8 MG/DL
DIFFERENTIAL METHOD: ABNORMAL
EOSINOPHIL # BLD AUTO: 0.1 K/UL
EOSINOPHIL NFR BLD: 0.8 %
ERYTHROCYTE [DISTWIDTH] IN BLOOD BY AUTOMATED COUNT: 13.4 %
EST. GFR  (AFRICAN AMERICAN): >60 ML/MIN/1.73 M^2
EST. GFR  (NON AFRICAN AMERICAN): >60 ML/MIN/1.73 M^2
GLUCOSE SERPL-MCNC: 297 MG/DL
HCT VFR BLD AUTO: 33.9 %
HGB BLD-MCNC: 11 G/DL
LYMPHOCYTES # BLD AUTO: 1.3 K/UL
LYMPHOCYTES NFR BLD: 15.1 %
MAGNESIUM SERPL-MCNC: 1.3 MG/DL
MCH RBC QN AUTO: 29.3 PG
MCHC RBC AUTO-ENTMCNC: 32.4 %
MCV RBC AUTO: 90 FL
MONOCYTES # BLD AUTO: 0.8 K/UL
MONOCYTES NFR BLD: 9.7 %
NEUTROPHILS # BLD AUTO: 6.1 K/UL
NEUTROPHILS NFR BLD: 74.1 %
PHOSPHATE SERPL-MCNC: 2.9 MG/DL
PLATELET # BLD AUTO: 181 K/UL
PMV BLD AUTO: 12 FL
POCT GLUCOSE: 215 MG/DL (ref 70–110)
POCT GLUCOSE: 253 MG/DL (ref 70–110)
POCT GLUCOSE: 322 MG/DL (ref 70–110)
POTASSIUM SERPL-SCNC: 3.7 MMOL/L
RBC # BLD AUTO: 3.76 M/UL
SODIUM SERPL-SCNC: 137 MMOL/L
WBC # BLD AUTO: 8.27 K/UL

## 2017-03-22 PROCEDURE — 63600175 PHARM REV CODE 636 W HCPCS: Performed by: ORTHOPAEDIC SURGERY

## 2017-03-22 PROCEDURE — 25000003 PHARM REV CODE 250: Performed by: HOSPITALIST

## 2017-03-22 PROCEDURE — 97162 PT EVAL MOD COMPLEX 30 MIN: CPT

## 2017-03-22 PROCEDURE — 80048 BASIC METABOLIC PNL TOTAL CA: CPT

## 2017-03-22 PROCEDURE — 97166 OT EVAL MOD COMPLEX 45 MIN: CPT

## 2017-03-22 PROCEDURE — 97535 SELF CARE MNGMENT TRAINING: CPT

## 2017-03-22 PROCEDURE — 36415 COLL VENOUS BLD VENIPUNCTURE: CPT

## 2017-03-22 PROCEDURE — 12000002 HC ACUTE/MED SURGE SEMI-PRIVATE ROOM

## 2017-03-22 PROCEDURE — 84100 ASSAY OF PHOSPHORUS: CPT

## 2017-03-22 PROCEDURE — 97530 THERAPEUTIC ACTIVITIES: CPT

## 2017-03-22 PROCEDURE — 85025 COMPLETE CBC W/AUTO DIFF WBC: CPT

## 2017-03-22 PROCEDURE — 83735 ASSAY OF MAGNESIUM: CPT

## 2017-03-22 PROCEDURE — 63600175 PHARM REV CODE 636 W HCPCS: Performed by: HOSPITALIST

## 2017-03-22 PROCEDURE — 25000003 PHARM REV CODE 250: Performed by: ORTHOPAEDIC SURGERY

## 2017-03-22 PROCEDURE — 99232 SBSQ HOSP IP/OBS MODERATE 35: CPT | Mod: ,,, | Performed by: HOSPITALIST

## 2017-03-22 RX ORDER — INSULIN ASPART 100 [IU]/ML
2 INJECTION, SOLUTION INTRAVENOUS; SUBCUTANEOUS
Status: DISCONTINUED | OUTPATIENT
Start: 2017-03-22 | End: 2017-03-23

## 2017-03-22 RX ORDER — LORAZEPAM 2 MG/ML
0.5 INJECTION INTRAMUSCULAR ONCE
Status: COMPLETED | OUTPATIENT
Start: 2017-03-22 | End: 2017-03-22

## 2017-03-22 RX ORDER — SIMVASTATIN 40 MG/1
TABLET, FILM COATED ORAL
Qty: 90 TABLET | Refills: 0 | Status: SHIPPED | OUTPATIENT
Start: 2017-03-22 | End: 2017-03-24 | Stop reason: HOSPADM

## 2017-03-22 RX ORDER — LORAZEPAM 0.5 MG/1
0.5 TABLET ORAL ONCE
Status: DISCONTINUED | OUTPATIENT
Start: 2017-03-22 | End: 2017-03-22

## 2017-03-22 RX ADMIN — ENOXAPARIN SODIUM 40 MG: 100 INJECTION SUBCUTANEOUS at 05:03

## 2017-03-22 RX ADMIN — INSULIN ASPART 2 UNITS: 100 INJECTION, SOLUTION INTRAVENOUS; SUBCUTANEOUS at 05:03

## 2017-03-22 RX ADMIN — LISINOPRIL 40 MG: 20 TABLET ORAL at 09:03

## 2017-03-22 RX ADMIN — ACETAMINOPHEN 650 MG: 325 TABLET ORAL at 05:03

## 2017-03-22 RX ADMIN — SIMVASTATIN 40 MG: 40 TABLET, FILM COATED ORAL at 09:03

## 2017-03-22 RX ADMIN — FAMOTIDINE 20 MG: 20 TABLET, FILM COATED ORAL at 09:03

## 2017-03-22 RX ADMIN — INSULIN ASPART 4 UNITS: 100 INJECTION, SOLUTION INTRAVENOUS; SUBCUTANEOUS at 09:03

## 2017-03-22 RX ADMIN — DOCUSATE SODIUM 100 MG: 100 CAPSULE, LIQUID FILLED ORAL at 09:03

## 2017-03-22 RX ADMIN — ROPINIROLE HYDROCHLORIDE 10 MG: 1 TABLET, FILM COATED ORAL at 09:03

## 2017-03-22 RX ADMIN — LORAZEPAM 0.5 MG: 2 INJECTION INTRAMUSCULAR; INTRAVENOUS at 01:03

## 2017-03-22 RX ADMIN — INSULIN ASPART 3 UNITS: 100 INJECTION, SOLUTION INTRAVENOUS; SUBCUTANEOUS at 02:03

## 2017-03-22 RX ADMIN — INSULIN ASPART 1 UNITS: 100 INJECTION, SOLUTION INTRAVENOUS; SUBCUTANEOUS at 09:03

## 2017-03-22 RX ADMIN — POLYETHYLENE GLYCOL 3350 17 G: 17 POWDER, FOR SOLUTION ORAL at 09:03

## 2017-03-22 RX ADMIN — ACETAMINOPHEN 1000 MG: 10 INJECTION, SOLUTION INTRAVENOUS at 06:03

## 2017-03-22 RX ADMIN — PREGABALIN 75 MG: 75 CAPSULE ORAL at 09:03

## 2017-03-22 RX ADMIN — STANDARDIZED SENNA CONCENTRATE AND DOCUSATE SODIUM 1 TABLET: 8.6; 5 TABLET, FILM COATED ORAL at 09:03

## 2017-03-22 RX ADMIN — CEFAZOLIN SODIUM 2 G: 2 SOLUTION INTRAVENOUS at 02:03

## 2017-03-22 RX ADMIN — INSULIN DETEMIR 10 UNITS: 100 INJECTION, SOLUTION SUBCUTANEOUS at 09:03

## 2017-03-22 NOTE — PLAN OF CARE
MIRI called in to [facility] at [phone].  TAMI faxed to [facility] at [phone].     Addendum 3:54 PM  Pt's referral at [facility] and [facility] under review by both facilities.    Carleen Russo, EMILY  [phone]

## 2017-03-22 NOTE — SUBJECTIVE & OBJECTIVE
Interval History: Pt says her pain is well controlled, but she is very anxious about further intervention.     Review of Systems   Constitutional: Negative for chills and fever.   Respiratory: Negative for cough, shortness of breath and wheezing.    Cardiovascular: Negative for chest pain and palpitations.   Gastrointestinal: Negative for abdominal pain, constipation, diarrhea and nausea.   Genitourinary: Negative for difficulty urinating, dysuria and frequency.     Objective:     Vital Signs (Most Recent):  Temp: 96.6 °F (35.9 °C) (03/22/17 0800)  Pulse: (!) 122 (03/22/17 1100)  Resp: 18 (03/22/17 0800)  BP: 132/62 (03/22/17 0800)  SpO2: (!) 90 % (03/22/17 0800) Vital Signs (24h Range):  Temp:  [96.6 °F (35.9 °C)-98.9 °F (37.2 °C)] 96.6 °F (35.9 °C)  Pulse:  [] 122  Resp:  [14-25] 18  SpO2:  [90 %-100 %] 90 %  BP: (106-189)/() 132/62     Weight: 63.5 kg (140 lb)  Body mass index is 27.34 kg/(m^2).    Intake/Output Summary (Last 24 hours) at 03/22/17 1353  Last data filed at 03/22/17 0600   Gross per 24 hour   Intake             1540 ml   Output              655 ml   Net              885 ml      Physical Exam   Constitutional: She is oriented to person, place, and time. She appears well-developed and well-nourished. No distress.   HENT:   Head: Normocephalic and atraumatic.   Cardiovascular: Normal rate and regular rhythm.    No murmur heard.  Pulmonary/Chest: Effort normal and breath sounds normal. She has no wheezes.   Abdominal: Soft. Bowel sounds are normal. She exhibits no distension. There is no tenderness.   Neurological: She is alert and oriented to person, place, and time.   Skin: Skin is warm and dry. No rash noted.

## 2017-03-22 NOTE — PROGRESS NOTES
Labs not sent and x-rays not done due to pt combative and refusing. Dr. Kim aware; states ok to complete once pt cooperative. Handed off to receiving RN to complete tasks.

## 2017-03-22 NOTE — PROGRESS NOTES
Daily Orthopaedic Progress Note    Zeina White is a 89 y.o. female admitted on 3/21/2017  Hospital Day: 1  Post Op Day: 1 Day Post-Op      The patient was seen and examined this morning at the bedside. RASHIDA overnight. Less combative this morning. Family at bedside. Afebrile.     +small   -flatus  +Tolerating PO  - fevers/chills     PHYSICAL EXAM:  Awake/alert/oriented x3, No acute distress, Afebrile, Vital signs stable  Good inspiratory effort with unlaboured breathing  Dressings Clean,Dry,Intact  Palpable distal pulses  Neurovascularly intact      Vitals:    03/22/17 0300 03/22/17 0400 03/22/17 0500 03/22/17 0700   BP:       BP Location:       Patient Position:       BP Method:       Pulse: 76 82 81 80   Resp:  16     Temp:  98.4 °F (36.9 °C)     TempSrc:  Oral     SpO2:  96%     Weight:       Height:         I/O last 3 completed shifts:  In: 1540 [P.O.:340; I.V.:1000; IV Piggyback:200]  Out: 1455 [Urine:1355; Blood:100]    Recent Labs  Lab 03/21/17  0544 03/21/17  1719   CALCIUM 8.8 8.4*   PROT 6.4  --     137   K 3.6 4.2   CO2 26 21*    102   BUN 15 16   CREATININE 0.7 0.7       Recent Labs  Lab 03/21/17  0544 03/21/17  1719   WBC 12.33  --    RBC 4.57  --    HGB 13.4 12.5   HCT 41.1 37.2     --      No results for input(s): INR, APTT in the last 72 hours.    Invalid input(s): PT    A/P: 89 y.o. female 1 Day Post-Op s/p left intramedullary nail for basicervical femoral neck fx   -Continue with current pain control regimen  -Continue with current physical therapy plan, WBAT  -Continue with DVT prophylaxis, Lovenox daily  -DC small after PT  -ancef post-op x 2 doses     Ha Her M.D. PGY3  [facility]   Orthopaedic Surgery Resident    Attg Note:  I agree with the above assessment and plan.    Presley Cornejo MD

## 2017-03-22 NOTE — PT/OT/SLP EVAL
Occupational Therapy  Evaluation    June T Cindy   MRN: [id number]   Admitting Diagnosis: Closed basicervical fracture of left femur    OT Date of Treatment: 03/22/17   OT Start Time: 0913  OT Stop Time: 0948  OT Total Time (min): 35 min    Billable Minutes:  Evaluation 10  Self Care/Home Management 25    Diagnosis: Closed basicervical fracture of left femur   S/p L IM nail    Past Medical History:   Diagnosis Date    Anxiety     Atypical chest pain     Dementia     Diabetes mellitus     DJD (degenerative joint disease)     GERD (gastroesophageal reflux disease)     Hyperlipidemia     Hypertension     Irritable bowel     Tremor, essential     Vertigo       Past Surgical History:   Procedure Laterality Date    HYSTERECTOMY      JOINT REPLACEMENT      L total knee    KNEE SURGERY      bilat    QUADRICEPS REPAIR      R       General Precautions: Standard, fall, hearing impaired, vision impaired  Orthopedic Precautions: LLE weight bearing as tolerated    Patient History:  Living Environment  Lives With: child(miguel), dependent (3 adult children with intellectual disabilities)  Living Arrangements: house  Home Accessibility: stairs to enter home, stairs within home  Home Layout: Bathroom on 2nd floor, Bedroom on 2nd floor  Number of Stairs to Enter Home: 1  Number of Stairs Within Home: 15  Stair Railings at Home: inside, present on right side  Transportation Available: family or friend will provide  Living Environment Comment: Spv/(A) available PRN  Equipment Currently Used at Home: cane, straight    Prior level of function:   Bed Mobility/Transfers: needs device  Grooming: independent  Bathing: independent  Upper Body Dressing: independent  Lower Body Dressing: independent  Toileting: independent  Home Management Skills: independent      Subjective:  Communicated with RN prior to session.    Pt agreeable to Evaluation.     Pain Rating: 3/10  Location - Side: Left  Location: leg  Pain Addressed:  "Pre-medicate for activity, Reposition, Distraction    Objective:  Patient found with: small catheter, peripheral IV, telemetry    Upper Extremity Range of Motion:  Right Upper Extremity: WFL  Left Upper Extremity: WFL    Upper Extremity Strength:  Right Upper Extremity: 4-/5  Left Upper Extremity: 4-/5    Functional Mobility:  Bed Mobility:  Supine to Sit: Moderate Assistance    Transfers:  Sit <> Stand Assistance: Moderate Assistance  Sit <> Stand Assistive Device: Standard Walker    Bed <> Chair Technique: Stand Pivot  Bed <> Chair Transfer Assistance: Moderate Assistance  Bed <> Chair Assistive Device: Standard Walker    Activities of Daily Living:  UE Dressing Level of Assistance: Moderate assistance    LE Dressing Level of Assistance: Total assistance    Grooming Position: Seated, EOB  Grooming Level of Assistance: Stand by assistance   Wash face, hands, comb/style hair; increased time     Balance:   Static Sit: GOOD-: Takes MODERATE challenges from all directions but inconsistently  Dynamic Sit: GOOD-: Maintains balance through MODERATE excursions of active trunk movement,     Static Stand: POOR: Needs MODERATE assist to maintain  Dynamic stand: POOR: N/A    AM-PAC 6 CLICK ADL  How much help from another person does this patient currently need?  1 = Unable, Total/Dependent Assistance  2 = A lot, Maximum/Moderate Assistance  3 = A little, Minimum/Contact Guard/Supervision  4 = None, Modified [place]/Independent    Putting on and taking off regular lower body clothing? : 2  Bathing (including washing, rinsing, drying)?: 2  Toileting, which includes using toilet, bedpan, or urinal? : 2  Putting on and taking off regular upper body clothing?: 3  Taking care of personal grooming such as brushing teeth?: 3  Eating meals?: 4  Total Score: 16    AM-PAC Raw Score CMS "G-Code Modifier Level of Impairment Assistance   6 % Total / Unable   7 - 9 CM 80 - 100% Maximal Assist   10 - 14 CL 60 - 80% Moderate Assist "   15 - 19 CK 40 - 60% Moderate Assist   20 - 22 CJ 20 - 40% Minimal Assist   23 CI 1-20% SBA / CGA   24 CH 0% Independent/ Mod I       Patient left up in chair with all lines intact, call button in reach and RN notified    Assessment:  Zeina White is a 89 y.o. female with a medical diagnosis of Closed basicervical fracture of left femur and presents with overall strength deficits and decreased function of L LE impeding her ability to perform ADLs and functional mobility and would benefit from OT services to maximize functional (I) and safety.    Rehab identified problem list/impairments: Rehab identified problem list/impairments: impaired endurance, weakness, impaired self care skills, impaired functional mobilty, gait instability, impaired balance, decreased lower extremity function, decreased safety awareness, pain, decreased ROM, visual deficits, impaired skin, orthopedic precautions    Rehab potential is good.    Activity tolerance: Good    Discharge recommendations: Discharge Facility/Level Of Care Needs: nursing facility, skilled     Barriers to discharge: Barriers to Discharge: Decreased caregiver support, Inaccessible home environment    Equipment recommendations: bedside commode, bath bench, walker, standard     GOALS:   Occupational Therapy Goals        Problem: Occupational Therapy Goal    Goal Priority Disciplines Outcome Interventions   Occupational Therapy Goal     OT, PT/OT     Description:  Goals to be met by: 04/10     Patient will increase functional independence with ADLs by performing:    UE Dressing with Stand-by Assistance.  LE Dressing with Moderate Assistance and Assistive Devices as needed.  Grooming while seated with Set-up Assistance.  Toileting from bedside commode with Moderate Assistance for hygiene and clothing management.   Stand pivot transfers with CGA.  Toilet transfer to bedside commode with CGA.                PLAN:  Patient to be seen 5 x/week to address the above listed  problems via self-care/home management, therapeutic activities, therapeutic exercises  Plan of Care reviewed with: patient, daughter    Julisa PLUNKETT NATHALIA Roberts  03/22/2017

## 2017-03-22 NOTE — ASSESSMENT & PLAN NOTE
Encounter for aftercare for healing closed traumatic fracture of lower extremity  Pt was admitted to [facility] and medically optimized.  Ortho was consulted and the pt underwent cephalomedullary nail fixation of left basicervical femoral neck fracture by Dr Presley Cornejo on 3/21/2017.  Post op pt is WBAT to LLE per ortho recs.  PT/OT consulted and are recommending SNF. Lovenox for DVT ppx to be constiued for 28 days post op (END DATE: 4/20/2017).

## 2017-03-22 NOTE — ANESTHESIA POSTPROCEDURE EVALUATION
Anesthesia Post Evaluation    Patient: June T Estopinal    Procedure(s) Performed: Procedure(s) (LRB):  IM NAILING OF FEMUR (Left)    Final Anesthesia Type: general  Patient location during evaluation: PACU  Patient participation: Yes- Able to Participate  Level of consciousness: awake and alert and oriented  Post-procedure vital signs: reviewed and stable  Pain management: adequate  Airway patency: patent  PONV status at discharge: No PONV  Anesthetic complications: no      Cardiovascular status: blood pressure returned to baseline  Respiratory status: unassisted and nasal cannula  Hydration status: euvolemic  Follow-up not needed.        Visit Vitals    /63    Pulse 82    Temp 36.1 °C (97 °F) (Temporal)    Resp 20    Wt 68 kg (149 lb 14.6 oz)    SpO2 98%    Breastfeeding No    BMI 28.33 kg/m2       Pain/Zabrina Score: Pain Assessment Performed: Yes (3/21/2017  9:00 PM)  Presence of Pain: non-verbal indicators absent (pt resting quielty with NAD noted & daughters at bedside ) (3/21/2017  9:00 PM)  Pain Rating Prior to Med Admin: 5 (3/21/2017  9:06 PM)  Zabrina Score: 9 (3/21/2017  5:13 PM)

## 2017-03-22 NOTE — PLAN OF CARE
Problem: Patient Care Overview  Goal: Plan of Care Review  Outcome: Ongoing (interventions implemented as appropriate)  Pt had an aggressive episode throughout day towards daughter and staff, ativan was ordered and given to pt and pt calmed down, VS and assessment per flow sheet, patient progressing towards goals as tolerated, plan of care reviewed with Zeina White and family, all concerns addressed, will continue to monitor.

## 2017-03-22 NOTE — PLAN OF CARE
"SW met with pt's daughter Elvie at bedside to discuss pt's d/c disposition. Pt was sound asleep during conversation. Elvie reported that earlier today when someone mentioned skilled nursing to the pt, she became extremely agitated and they had to sedate pt. Elvie and siblings are aware that pt is likely to need SNF, and they prefer [facility]. Their [facility] choice facility is [facility].    Elvie mentioned that they would like to know options for pt to go home in case pt refuses to go to SNF. SW explained levels of care - specifically SNF v. HH services. SW mentioned that they can hire private RN or sitter if pt requires 24/7 assistance. Per Elvie, pt lives with her 2 sons and another daughter, all 3 of whom are affected by "Fragile X." As a result, all 3 children that live with pt have some form of intellectual disability that makes caring for their mother more difficult.     Elvie would like pt to be able to make an informed decision regarding d/c disposition, so SW to return to pt's room shortly to provide Elvie with Senior resource guide for sitters and home health list.    FRANTZ emailed LUCIANO Warren to initiate referrals to:  1. [facility]  2. [facility]    Addendum 3:03 PM  FRANTZ provided Elvie with Senior resource guide and also provided phone number for [employer] member services.    Carleen Russo LMSW  [id number]    "

## 2017-03-22 NOTE — PLAN OF CARE
Problem: Physical Therapy Goal  Goal: Physical Therapy Goal  Goals to be met by: 3/30/2017     Patient will increase functional independence with mobility by performin. Supine to sit with Stand-by Assistance  2. Sit to supine with Stand-by Assistance  3. Sit to stand transfer with Stand-by Assistance  4. Gait x 50 feet with Contact Guard Assistance using Standard Walker.   5. (I) with HEP  Outcome: Ongoing (interventions implemented as appropriate)  Amy Diop, ESTHER    I certify that I was present in the room directing the student in service delivery and guiding them using my skilled judgment. As the co-signing therapist I have reviewed the students documentation and am responsible for the treatment, assessment, and plan.     Dayton Tyson, PT  3/23/2017

## 2017-03-22 NOTE — ADDENDUM NOTE
Addendum  created 03/22/17 1417 by Mary Watson RN    Anesthesia Event edited, Procedure Event Log accessed

## 2017-03-22 NOTE — PT/OT/SLP EVAL
Physical Therapy  Evaluation/Treatment    June T Cindy   MRN: [id number]   Admitting Diagnosis: Closed basicervical fracture of left femur    PT Received On: 03/22/17  PT Start Time: 0913     PT Stop Time: 0948    PT Total Time (min): 35 min       Billable Minutes:  Evaluation 10 and Therapeutic Activity 25    Diagnosis: Closed basicervical fracture of left femur   PT/OT Co-eval    Past Medical History:   Diagnosis Date    Anxiety     Atypical chest pain     Dementia     Diabetes mellitus     DJD (degenerative joint disease)     GERD (gastroesophageal reflux disease)     Hyperlipidemia     Hypertension     Irritable bowel     Tremor, essential     Vertigo       Past Surgical History:   Procedure Laterality Date    HYSTERECTOMY      JOINT REPLACEMENT      L total knee    KNEE SURGERY      bilat    QUADRICEPS REPAIR      R           General Precautions: Standard, fall, hearing impaired  Orthopedic Precautions: LLE weight bearing as tolerated            Patient History:  Lives With: child(miguel), adult (Mentally handicapped)  Living Arrangements: house  Home Accessibility: stairs to enter home, stairs within home  Number of Stairs to Enter Home: 1  Number of Stairs Within Home: 15  Stair Railings at Home: inside, present on right side  Equipment Currently Used at Home: cane, straight  DME owned (not currently used): single point cane    Previous Level of Function:  Ambulation Skills: needs assist  Transfer Skills: needs assist  ADL Skills: needs assist  Work/Leisure Activity: needs assist    Subjective:  Communicated with NSG prior to session.  Pt was agreeable to therapy. Hard of hearing, need to speak in L ear.  Chief Complaint: Decreased mobility  Patient goals: Return to PLOF                         Objective:   Patient found with: small catheter, peripheral IV, telemetry     Cognitive Exam:  Oriented to: DTA 2/2 [place]    Follows Commands/attention: Follows one-step commands  Communication: DTA 2/2  [facility]  Safety awareness/insight to disability: impaired    Physical Exam:    Skin integrity: Thin, bruise easily   Edema: Mild     Upper Extremity Range of Motion:  Right Upper Extremity: WNL  Left Upper Extremity: WNL    Upper Extremity Strength:   Right Upper Extremity: WNL  Left Upper Extremity: WNL    Lower Extremity Range of Motion:  Right Lower Extremity: WNL  Left Lower Extremity: WFL    Lower Extremity Strength:  Right Lower Extremity: 4-/5 grossly  Left Lower Extremity: 3/5 grossly      Functional Mobility:  Bed Mobility:  Supine to Sit: Minimum Assistance, With leg lift    Transfers:  Sit <> Stand Assistance: Minimum Assistance (Lots of verbal cues needed to understand task)  Sit <> Stand Assistive Device: Standard Walker    Gait:   Not attempted 2/2 to safety    Balance:   Static Sit: FAIR+: Able to take MINIMAL challenges from all directions  Dynamic Sit: FAIR+: Maintains balance through MINIMAL excursions of active trunk motion  Static Stand: FAIR: Maintains without assist but unable to take challenges  Dynamic stand: FAIR: Needs CONTACT GUARD during gait    Therapeutic Activities and Exercises:  White board updated    Supine therex per handout, 20x each  · Ankle pumps  · Glute squeezes     Pt/family educated on:  · HEP  · DME management  · PT POC  · OOB importance for healing and mobility     Pt sat at EOB x ~20 mins throughout session with SBA while performing dynamic balance tasks such as reaching for objects and self care      AM-PAC 6 CLICK MOBILITY  How much help from another person does this patient currently need?   1 = Unable, Total/Dependent Assistance  2 = A lot, Maximum/Moderate Assistance  3 = A little, Minimum/Contact Guard/Supervision  4 = None, Modified [place]/Independent    Turning over in bed (including adjusting bedclothes, sheets and blankets)?: 2  Sitting down on and standing up from a chair with arms (e.g., wheelchair, bedside commode, etc.): 2  Moving from lying on back to  sitting on the side of the bed?: 2  Moving to and from a bed to a chair (including a wheelchair)?: 2  Need to walk in hospital room?: 2  Climbing 3-5 steps with a railing?: 2  Total Score: 12     AM-PAC Raw Score CMS G-Code Modifier Level of Impairment Assistance   6 % Total / Unable   7 - 9 CM 80 - 100% Maximal Assist   10 - 14 CL 60 - 80% Moderate Assist   15 - 19 CK 40 - 60% Moderate Assist   20 - 22 CJ 20 - 40% Minimal Assist   23 CI 1-20% SBA / CGA   24 CH 0% Independent/ Mod I     Patient left up in chair with all lines intact and call button in reach.    Assessment:   Zeina White is a 89 y.o. female with a medical diagnosis of Closed basicervical fracture of left femur. Pt did well in therapy. Pt did have trouble with command following 2/2 [place].  Pt is unsafe to return to her previous living arrangments at her current functional level, and SNF placement is indicated at this time.    Rehab identified problem list/impairments: Rehab identified problem list/impairments: weakness, impaired endurance, impaired sensation, impaired self care skills, impaired functional mobilty, impaired balance, visual deficits, decreased coordination, decreased lower extremity function, decreased safety awareness, pain, decreased ROM, impaired coordination, impaired skin, edema, orthopedic precautions    Rehab potential is good.    Activity tolerance: Good    Discharge recommendations: Discharge Facility/Level Of Care Needs: nursing facility, skilled     Barriers to discharge: Barriers to Discharge: Decreased caregiver support    Equipment recommendations: Equipment Needed After Discharge: bedside commode, shower chair, walker, standard     GOALS:   Physical Therapy Goals        Problem: Physical Therapy Goal    Goal Priority Disciplines Outcome Goal Variances Interventions   Physical Therapy Goal     PT/OT, PT Ongoing (interventions implemented as appropriate)     Description:  Goals to be met by: 3/30/2017     Patient  will increase functional independence with mobility by performin. Supine to sit with Stand-by Assistance  2. Sit to supine with Stand-by Assistance  3. Sit to stand transfer with Stand-by Assistance  4. Gait  x 50 feet with Contact Guard Assistance using Standard Walker.   5. (I) with HEP                PLAN:    Patient to be seen daily to address the above listed problems via gait training, therapeutic activities, therapeutic exercises  Plan of Care expires: 17  Plan of Care reviewed with: patient, spouse          Amy Diop, SPT  2017    I certify that I was present in the room directing the student in service delivery and guiding them using my skilled judgment. As the co-signing therapist I have reviewed the students documentation and am responsible for the treatment, assessment, and plan.     Dayton Tyson, PT  3/23/2017

## 2017-03-22 NOTE — PLAN OF CARE
POD 1 s/p left femur IM nailing. PT/OT ordered to eval and treat. PT/OT recs pending. Patient currently lives with 3 of her 7 adult children. Patient's daughter (Destiny) present at the bedside and stated that the 3 adult children the patient lives with have special needs. CM completed discharge assessment and planning with patient and patient's daughter. Patient's daughter verbalized understanding. All questions and concerns addressed. SW and CM will continue to follow for any additional needs. Plan A to discharge to SNF as soon as medically stable. Plan B to discharge home with home health. Patient's daughter requested [facility].    PCP: Alan Kaplan MD    Pharmacy:   [email or web address]  [facility]  [address] AT [facility] [place] [facility] [place]  [address]  [place] [address]  Phone: [phone] Fax: [phone]    Payor: HU[employer] MANAGED MEDICARE / Plan: HU[employer] MEDICARE HMO / Product Type: Capitation /      03/22/17 0815   Discharge Assessment   Assessment Type Discharge Planning Assessment   Confirmed/corrected address and phone number on facesheet? Yes   Assessment information obtained from? Caregiver;Patient;Medical Record   Expected Length of Stay (days) 4   Communicated expected length of stay with patient/caregiver yes   Prior to hospitilization cognitive status: Alert/Oriented   Prior to hospitalization functional status: Assistive Equipment   Current cognitive status: Alert/Oriented   Current Functional Status: Assistive Equipment   Arrived From home or self-care   Lives With child(miguel), adult   Able to Return to Prior Arrangements yes   Is patient able to care for self after discharge? Yes   How many people do you have in your home that can help with your care after discharge? 2   Who are your caregiver(s) and their phone number(s)? daughter- Kasandra Escobar [phone]; daughter- Destiny Franks [phone], [phone]   Patient's perception of discharge disposition skilled  nursing facility   Readmission Within The Last 30 Days no previous admission in last 30 days   Patient currently being followed by outpatient case management? No   Patient currently receives home health services? No   Does the patient currently use HME? Yes   Patient currently receives private duty nursing? No   Patient currently receives any other outside agency services? No   Equipment Currently Used at Home bedside commode;cane, straight;walker, rolling   Do you have any problems affording any of your prescribed medications? No   Is the patient taking medications as prescribed? yes   Do you have any financial concerns preventing you from receiving the healthcare you need? No   Does the patient have transportation to healthcare appointments? Yes   Transportation Available family or friend will provide  (daughter- Destiny Franks)   On Dialysis? No   Does the patient receive services at the [facility]? No   Are there any open cases? No   Discharge Plan A Skilled Nursing Facility   Discharge Plan B Home Health;Home with family   Patient/Family In Agreement With Plan yes

## 2017-03-22 NOTE — PROGRESS NOTES
Dr. New & Dr. Monreal at bedside starting PIV. Pt agitated. daughters at bedside. Successful 22g to R FA secured and covered. Pt not allowing O2 via NC or face mask. Simple Face Mask in bed as blow by at this time. Xray at bedside to attempt ordered post op views. Will cont to monitor

## 2017-03-22 NOTE — ANESTHESIA RELEASE NOTE
Anesthesia Release from PACU Note    Patient: June T Estopinal    Procedure(s) Performed: Procedure(s) (LRB):  IM NAILING OF FEMUR (Left)    Anesthesia type: general    Post pain: Adequate analgesia    Post assessment: no apparent anesthetic complications, tolerated procedure well and no evidence of recall    Last Vitals:   Visit Vitals    /63    Pulse 82    Temp 36.1 °C (97 °F) (Temporal)    Resp 20    Wt 68 kg (149 lb 14.6 oz)    SpO2 98%    Breastfeeding No    BMI 28.33 kg/m2       Post vital signs: stable    Level of consciousness: awake, alert  and oriented    Nausea/Vomiting: no nausea/no vomiting    Complications: none    Airway Patency: patent    Respiratory: unassisted, spontaneous ventilation, nasal cannula    Cardiovascular: stable and blood pressure at baseline    Hydration: euvolemic

## 2017-03-22 NOTE — ASSESSMENT & PLAN NOTE
Hold home metformin and will start levemir 10 and Aspart 2 with meals.  Continue SSI.  HA1C 9.6, so will optimize therapy prior to discharge.

## 2017-03-22 NOTE — PROGRESS NOTES
[facility] Hospital Medicine  Progress Note    Patient Name: Zeina White  MRN: [id number]  Patient Class: IP- Inpatient   Admission Date: 3/21/2017  Length of Stay: 1 days  Attending Physician: Deborah Castle MD  Primary Care Provider: Alan Kaplan MD    [facility] Medicine Team: [facility] MED  Deborah Castle MD    Subjective:     Principal Problem:Closed basicervical fracture of left femur    HPI:  Zeina White is a 89 y.o. female with a PMH of HTN, DM2, and dementia. The patient presented to [facility] on 3/21/2017 due to pain in her left hip following a fall in her bedroom.  Pt states she got up in the middle of the night because she thought she heard voices and wanted to see who was there. She denies hitting her head or any loss of consciousness. Imaging in the ER revealed a left femoral neck fracture.     Pt lives with her daughter and is very [place]. She is able to ambulate around the house, but uses a cane or walker for when they leave the house. She is able to feed herself, but requires assistance with her other ADLs.  She denies any history of heart disease or previous heart attack.  No history of chest pains.         Hospital Course:  Pt was admitted to [facility] and medically optimized.  Ortho was consulted and the pt underwent cephalomedullary nail fixation of left basicervical femoral neck fracture by Dr Presley Cornejo on 3/21/2017.  Post op pt is WBAT to LLE per ortho recs.  PT/OT consulted and are recommending SNF. Lovenox for DVT ppx to be constiued for 28 days post op (END DATE: 4/20/2017).     Interval History: Pt says her pain is well controlled, but she is very anxious about further intervention.     Review of Systems   Constitutional: Negative for chills and fever.   Respiratory: Negative for cough, shortness of breath and wheezing.    Cardiovascular: Negative for chest pain and palpitations.   Gastrointestinal: Negative for abdominal pain, constipation, diarrhea  and nausea.   Genitourinary: Negative for difficulty urinating, dysuria and frequency.     Objective:     Vital Signs (Most Recent):  Temp: 96.6 °F (35.9 °C) (03/22/17 0800)  Pulse: (!) 122 (03/22/17 1100)  Resp: 18 (03/22/17 0800)  BP: 132/62 (03/22/17 0800)  SpO2: (!) 90 % (03/22/17 0800) Vital Signs (24h Range):  Temp:  [96.6 °F (35.9 °C)-98.9 °F (37.2 °C)] 96.6 °F (35.9 °C)  Pulse:  [] 122  Resp:  [14-25] 18  SpO2:  [90 %-100 %] 90 %  BP: (106-189)/() 132/62     Weight: 63.5 kg (140 lb)  Body mass index is 27.34 kg/(m^2).    Intake/Output Summary (Last 24 hours) at 03/22/17 1353  Last data filed at 03/22/17 0600   Gross per 24 hour   Intake             1540 ml   Output              655 ml   Net              885 ml      Physical Exam   Constitutional: She is oriented to person, place, and time. She appears well-developed and well-nourished. No distress.   HENT:   Head: Normocephalic and atraumatic.   Cardiovascular: Normal rate and regular rhythm.    No murmur heard.  Pulmonary/Chest: Effort normal and breath sounds normal. She has no wheezes.   Abdominal: Soft. Bowel sounds are normal. She exhibits no distension. There is no tenderness.   Neurological: She is alert and oriented to person, place, and time.   Skin: Skin is warm and dry. No rash noted.       Assessment/Plan:      * Closed basicervical fracture of left femur  Encounter for aftercare for healing closed traumatic fracture of lower extremity  Pt was admitted to [facility] and medically optimized.  Ortho was consulted and the pt underwent cephalomedullary nail fixation of left basicervical femoral neck fracture by Dr Presley Cornejo on 3/21/2017.  Post op pt is WBAT to LLE per ortho recs.  PT/OT consulted and are recommending SNF. Lovenox for DVT ppx to be constiued for 28 days post op (END DATE: 4/20/2017).     Type II diabetes mellitus with neurological manifestations  Hold home metformin and will start levemir 10 and Aspart 2 with meals.  Continue  SSI.  HA1C 9.6, so will optimize therapy prior to discharge.       Essential hypertension  Continue home meds      Dementia  stable      VTE Risk Mitigation         Ordered     enoxaparin injection 40 mg  Every 24 hours (non-standard times)     Route:  Subcutaneous        03/21/17 1723     Medium Risk of VTE  Once      03/21/17 1724          Deborah Castle MD  Department of Hospital Medicine   [facility]

## 2017-03-22 NOTE — NURSING TRANSFER
Nursing Transfer Note      3/21/2017     Transfer To: 507    Transfer via bed    Transfer with cardiac monitoring    Transported by RN & PACU Tech    Medicines sent: insulin pen     Chart send with patient: Yes    Notified: daughter at bedside     Patient reassessed at: 3/21/17@2200    Upon arrival to floor: bed in lowest position

## 2017-03-22 NOTE — PLAN OF CARE
Medicare discharge appeals right discussed with patient. IMM signed and placed in patient's chart. Patient verbalized understanding of IMM rights.     03/22/17 0815   Medicare Message   Important Message from Medicare regarding Discharge Appeal Rights Given to patient/caregiver;Explained to patient/caregiver;Signed/date by patient/caregiver   Date IMM was signed 03/22/17   Time IMM was signed 0815

## 2017-03-22 NOTE — PROGRESS NOTES
Dr. New and Dr. Kim at bedside speaking to pt's daughter. Pt remains agitated. VSS. Unable to obtain BPs; MDs aware. Report given to oncoming RNDipti.

## 2017-03-22 NOTE — PROGRESS NOTES
Dr. New at bedside assessing pt now that she is much calmer & cooperative. OK to go to assigned room on 5th floor. Attempted to call report & was told they would call back

## 2017-03-22 NOTE — PROGRESS NOTES
Patient arrived to floor from recovery via stretcher. Patient is awake, slightly confused, bordering on combative. Daughter at bedside. Skin is warm and dry. Pulses are present in all extremities. Neurovascularly intact. Dressing noted to left hip, dry, clean, and intact. Patient is [place]. Some crackles noted to lung fields. Bruises noted to bilateral arms. O2 at 2L/nc. #16 [place] catheter noted draining clear yellow urine. Ivf's infusing without difficulty. Bed alarm initiated. No immediate complaints of discomfort. Daughter oriented to environment. Will continue to monitor.

## 2017-03-22 NOTE — PLAN OF CARE
Problem: Occupational Therapy Goal  Goal: Occupational Therapy Goal  Goals to be met by: 04/10     Patient will increase functional independence with ADLs by performing:    UE Dressing with Stand-by Assistance.  LE Dressing with Moderate Assistance and Assistive Devices as needed.  Grooming while seated with Set-up Assistance.  Toileting from bedside commode with Moderate Assistance for hygiene and clothing management.   Stand pivot transfers with CGA.  Toilet transfer to bedside commode with CGA.     NATHALIA Doyle  3/22/2017

## 2017-03-23 LAB
BASOPHILS # BLD AUTO: 0.04 K/UL
BASOPHILS NFR BLD: 0.6 %
DIFFERENTIAL METHOD: ABNORMAL
EOSINOPHIL # BLD AUTO: 0.1 K/UL
EOSINOPHIL NFR BLD: 1 %
ERYTHROCYTE [DISTWIDTH] IN BLOOD BY AUTOMATED COUNT: 13.2 %
HCT VFR BLD AUTO: 31.5 %
HGB BLD-MCNC: 10.8 G/DL
LYMPHOCYTES # BLD AUTO: 1.3 K/UL
LYMPHOCYTES NFR BLD: 18.6 %
MCH RBC QN AUTO: 30.1 PG
MCHC RBC AUTO-ENTMCNC: 34.3 %
MCV RBC AUTO: 88 FL
MONOCYTES # BLD AUTO: 0.8 K/UL
MONOCYTES NFR BLD: 12.1 %
NEUTROPHILS # BLD AUTO: 4.6 K/UL
NEUTROPHILS NFR BLD: 67.4 %
PLATELET # BLD AUTO: 169 K/UL
PMV BLD AUTO: 11.3 FL
POCT GLUCOSE: 193 MG/DL (ref 70–110)
POCT GLUCOSE: 248 MG/DL (ref 70–110)
POCT GLUCOSE: 279 MG/DL (ref 70–110)
RBC # BLD AUTO: 3.59 M/UL
WBC # BLD AUTO: 6.77 K/UL

## 2017-03-23 PROCEDURE — 97535 SELF CARE MNGMENT TRAINING: CPT

## 2017-03-23 PROCEDURE — 85025 COMPLETE CBC W/AUTO DIFF WBC: CPT

## 2017-03-23 PROCEDURE — 63600175 PHARM REV CODE 636 W HCPCS: Performed by: HOSPITALIST

## 2017-03-23 PROCEDURE — 36415 COLL VENOUS BLD VENIPUNCTURE: CPT

## 2017-03-23 PROCEDURE — 25000003 PHARM REV CODE 250: Performed by: HOSPITALIST

## 2017-03-23 PROCEDURE — 12000002 HC ACUTE/MED SURGE SEMI-PRIVATE ROOM

## 2017-03-23 PROCEDURE — 99232 SBSQ HOSP IP/OBS MODERATE 35: CPT | Mod: ,,, | Performed by: HOSPITALIST

## 2017-03-23 PROCEDURE — 97116 GAIT TRAINING THERAPY: CPT

## 2017-03-23 PROCEDURE — 97530 THERAPEUTIC ACTIVITIES: CPT

## 2017-03-23 PROCEDURE — 25000003 PHARM REV CODE 250: Performed by: ORTHOPAEDIC SURGERY

## 2017-03-23 PROCEDURE — 63600175 PHARM REV CODE 636 W HCPCS: Performed by: ORTHOPAEDIC SURGERY

## 2017-03-23 PROCEDURE — 97110 THERAPEUTIC EXERCISES: CPT

## 2017-03-23 RX ADMIN — ACETAMINOPHEN 650 MG: 325 TABLET ORAL at 01:03

## 2017-03-23 RX ADMIN — ACETAMINOPHEN 650 MG: 325 TABLET ORAL at 05:03

## 2017-03-23 RX ADMIN — ACETAMINOPHEN 650 MG: 325 TABLET ORAL at 09:03

## 2017-03-23 RX ADMIN — LISINOPRIL 40 MG: 20 TABLET ORAL at 08:03

## 2017-03-23 RX ADMIN — DOCUSATE SODIUM 100 MG: 100 CAPSULE, LIQUID FILLED ORAL at 09:03

## 2017-03-23 RX ADMIN — ENOXAPARIN SODIUM 40 MG: 100 INJECTION SUBCUTANEOUS at 05:03

## 2017-03-23 RX ADMIN — ROPINIROLE HYDROCHLORIDE 10 MG: 1 TABLET, FILM COATED ORAL at 09:03

## 2017-03-23 RX ADMIN — INSULIN ASPART 2 UNITS: 100 INJECTION, SOLUTION INTRAVENOUS; SUBCUTANEOUS at 08:03

## 2017-03-23 RX ADMIN — ACETAMINOPHEN 650 MG: 325 TABLET ORAL at 08:03

## 2017-03-23 RX ADMIN — FAMOTIDINE 20 MG: 20 TABLET, FILM COATED ORAL at 09:03

## 2017-03-23 RX ADMIN — SIMVASTATIN 40 MG: 40 TABLET, FILM COATED ORAL at 09:03

## 2017-03-23 RX ADMIN — FAMOTIDINE 20 MG: 20 TABLET, FILM COATED ORAL at 08:03

## 2017-03-23 RX ADMIN — STANDARDIZED SENNA CONCENTRATE AND DOCUSATE SODIUM 1 TABLET: 8.6; 5 TABLET, FILM COATED ORAL at 09:03

## 2017-03-23 RX ADMIN — DOCUSATE SODIUM 100 MG: 100 CAPSULE, LIQUID FILLED ORAL at 08:03

## 2017-03-23 RX ADMIN — PREGABALIN 75 MG: 75 CAPSULE ORAL at 09:03

## 2017-03-23 NOTE — PHYSICIAN QUERY
PT Name: Zeina White  MR #: [id number]    Physician Query Form - Neurological Condition  Clarification       CDS/: Gaviota Mast RN CCDS             Contact information: [email or web address]     This form is a permanent document in the medical record.     Query Date: March 23, 2017    By submitting this query, we are merely seeking further clarification of documentation. Please utilize your independent clinical judgment when addressing the question(s) below.    The Medical record contains the following:   Indicators   Supporting Clinical Findings Location in Medical Record   X AMS, Confusion documented inadvertently removed second IV. Pt agitated and combative    slightly confused, bordering on combative     3/21 nurse note      3/21 nurse note          Radiology findings     X Medication(s) Orders received for 2mg IM haldol   Orders received to give remaining 3mg IM haldol; orders carried out  Lorazepam 0.5 mg po x1  Lorazepam 0.5 mg IV x1 3/21 nurse note      3/22 mar  3/22 mar    Treatment(s)     X Behavioral Disturbance(s) documented aggressive episode throughout day towards daughter and staff, ativan was ordered and given to pt and pt calmed down 3/22 nurse note   X Other:  Dementia H/p 3/21 , pn 3/22       Provider, please specify the diagnosis or diagnoses associated with above clinical findings.    [ ] Senile Dementia    [ ] Primary Dementia    [ ] Dementia with behavioral disturbances    [x ] Dementia without behavior disturbances    [ ] Vascular Dementia with behavioral disturbances    [ ] Vascular Dementia without behavioral disturbances    [ ] Dementia With Lewy Bodies    [ ] Other Neurological diagnosis (please specify) ___________________________________    [ ] Clinically Undetermined    Please document in your progress notes daily for the duration of treatment, until resolved, and include in your discharge summary.

## 2017-03-23 NOTE — PLAN OF CARE
Problem: Physical Therapy Goal  Goal: Physical Therapy Goal  Goals to be met by: 3/30/2017     Patient will increase functional independence with mobility by performin. Supine to sit with Stand-by Assistance  2. Sit to supine with Stand-by Assistance  3. Sit to stand transfer with Stand-by Assistance  4. Gait x 50 feet with Contact Guard Assistance using Standard Walker.   5. (I) with HEP   Outcome: Ongoing (interventions implemented as appropriate)  Pt tolerated session well today with improved activity tolerance and endurance. Pt ambulated 20' today with mod (A) provided to block knee and for sequencing using SW. Pt also performed multiple t/f's and exercises. Pt will cont to benefit from skilled therapy services and remains appropriate for SNF placement.

## 2017-03-23 NOTE — PROGRESS NOTES
Daily Orthopaedic Progress Note    Zeina White is a 89 y.o. female admitted on 3/21/2017  Hospital Day: 2  Post Op Day: 2 Days Post-Op      The patient was seen and examined this morning at the bedside. RASHIDA overnight. Pain controlled. Resting comfortably this AM. Family at bedside.     +small   +flatus  +Tolerating PO  - fevers/chills     PHYSICAL EXAM:  Awake/alert/oriented x3, No acute distress, Afebrile, Vital signs stable  Good inspiratory effort with unlaboured breathing  Dressings Clean,Dry,Intact  Palpable distal pulses  Neurovascularly intact      Vitals:    03/22/17 1900 03/22/17 2012 03/22/17 2300 03/23/17 0300   BP:  132/61     BP Location:  Right arm     Patient Position:  Lying     BP Method:  Automatic     Pulse: 96 102 98 102   Resp:  18     Temp:  97.1 °F (36.2 °C)     TempSrc:  Oral     SpO2:  95%     Weight:       Height:         I/O last 3 completed shifts:  In: 1540 [P.O.:340; I.V.:1000; IV Piggyback:200]  Out: 1455 [Urine:1355; Blood:100]    Recent Labs  Lab 03/21/17  0544 03/21/17  1719 03/22/17  0937   CALCIUM 8.8 8.4* 8.4*   PROT 6.4  --   --     137 137   K 3.6 4.2 3.7   CO2 26 21* 24    102 101   BUN 15 16 14   CREATININE 0.7 0.7 0.8       Recent Labs  Lab 03/21/17  0544 03/21/17 1719 03/22/17  0937   WBC 12.33  --  8.27   RBC 4.57  --  3.76*   HGB 13.4 12.5 11.0*   HCT 41.1 37.2 33.9*     --  181     No results for input(s): INR, APTT in the last 72 hours.    Invalid input(s): PT    A/P: 89 y.o. female 2 Days Post-Op s/p left IMN for basicervical femoral neck fx   -Continue with current pain control regimen  -Continue with current physical therapy plan, WBAT  -Continue with DVT prophylaxis, Lovenox daily  -DC small     Dispo: likely will need SNF    Ha Her M.D. PGY3  [facility]   Orthopaedic Surgery Resident    Attg Note:  I agree with the above assessment and plan.    Presley Cornejo MD

## 2017-03-23 NOTE — PROGRESS NOTES
[facility] Hospital Medicine  Progress Note    Patient Name: Zeina White  MRN: [id number]  Patient Class: IP- Inpatient   Admission Date: 3/21/2017  Length of Stay: 2 days  Attending Physician: Deborah Castle MD  Primary Care Provider: Alan Kaplan MD    [facility] Medicine Team: [facility] HOSP MED  Deborah Castle MD    Subjective:     Principal Problem:Closed basicervical fracture of left femur    HPI:  Zeina White is a 89 y.o. female with a PMH of HTN, DM2, and dementia. The patient presented to [facility] on 3/21/2017 due to pain in her left hip following a fall in her bedroom.  Pt states she got up in the middle of the night because she thought she heard voices and wanted to see who was there. She denies hitting her head or any loss of consciousness. Imaging in the ER revealed a left femoral neck fracture.     Pt lives with her daughter and is very [place]. She is able to ambulate around the house, but uses a cane or walker for when they leave the house. She is able to feed herself, but requires assistance with her other ADLs.  She denies any history of heart disease or previous heart attack.  No history of chest pains.         Hospital Course:  Pt was admitted to [facility] and medically optimized.  Ortho was consulted and the pt underwent cephalomedullary nail fixation of left basicervical femoral neck fracture by Dr Presley Cornejo on 3/21/2017.  Post op pt is WBAT to LLE per ortho recs.  PT/OT consulted and are recommending SNF. Lovenox for DVT ppx to be constiued for 28 days post op (END DATE: 4/20/2017).     Interval History: Pt sitting up in bedside chair all morning. Says she is feeling much better. Seems agreeable to going to SNF.     Review of Systems   Constitutional: Negative for chills and fever.   Respiratory: Negative for cough, shortness of breath and wheezing.    Cardiovascular: Negative for chest pain and palpitations.   Gastrointestinal: Negative for abdominal pain,  constipation, diarrhea and nausea.   Genitourinary: Negative for difficulty urinating, dysuria and frequency.     Objective:     Vital Signs (Most Recent):  Temp: 97.8 °F (36.6 °C) (03/23/17 1500)  Pulse: 83 (03/23/17 1500)  Resp: 14 (03/23/17 1500)  BP: 139/61 (03/23/17 1500)  SpO2: (!) 94 % (03/23/17 1500) Vital Signs (24h Range):  Temp:  [96.7 °F (35.9 °C)-98 °F (36.7 °C)] 97.8 °F (36.6 °C)  Pulse:  [] 83  Resp:  [14-18] 14  SpO2:  [92 %-95 %] 94 %  BP: (132-150)/(61-81) 139/61     Weight: 63.5 kg (140 lb)  Body mass index is 27.34 kg/(m^2).    Intake/Output Summary (Last 24 hours) at 03/23/17 1537  Last data filed at 03/23/17 1300   Gross per 24 hour   Intake              720 ml   Output             1150 ml   Net             -430 ml      Physical Exam   Constitutional: She is oriented to person, place, and time. She appears well-developed and well-nourished. No distress.   HENT:   Head: Normocephalic and atraumatic.   Cardiovascular: Normal rate and regular rhythm.    No murmur heard.  Pulmonary/Chest: Effort normal and breath sounds normal. She has no wheezes.   Abdominal: Soft. Bowel sounds are normal. She exhibits no distension. There is no tenderness.   Musculoskeletal: She exhibits tenderness (L hip).   Neurological: She is alert and oriented to person, place, and time.   Skin: Skin is warm and dry. No rash noted.       Assessment/Plan:      * Closed basicervical fracture of left femur  Encounter for aftercare for healing closed traumatic fracture of lower extremity  Pt was admitted to [facility] and medically optimized.  Ortho was consulted and the pt underwent cephalomedullary nail fixation of left basicervical femoral neck fracture by Dr Presley Cornejo on 3/21/2017.  Post op pt is WBAT to LLE per ortho recs.  PT/OT consulted and are recommending SNF. Lovenox for DVT ppx to be constiued for 28 days post op (END DATE: 4/20/2017).     Type II diabetes mellitus with neurological manifestations  After speaking  with family today, pt and her family would like to forgo further treatment for her diabetes as she is 89.      Essential hypertension  Continue home meds      Dementia  stable      VTE Risk Mitigation         Ordered     enoxaparin injection 40 mg  Every 24 hours (non-standard times)     Route:  Subcutaneous        03/21/17 1723     Medium Risk of VTE  Once      03/21/17 1724          Deborah Castle MD  Department of Hospital Medicine   [facility]

## 2017-03-23 NOTE — ASSESSMENT & PLAN NOTE
Encounter for aftercare for healing closed traumatic fracture of lower extremity  Pt was admitted to [facility] and medically optimized.  Ortho was consulted and the pt underwent cephalomedullary nail fixation of left basicervical femoral neck fracture by Dr Presley Cornejo on 3/21/2017.  Post op pt is WBAT to LLE per ortho recs.  PT/OT consulted and are recommending SNF. Lovenox for DVT ppx to be constiued for 28 days post op (END DATE: 4/20/2017).

## 2017-03-23 NOTE — PT/OT/SLP PROGRESS
Occupational Therapy  Treatment    Zeina White   MRN: [id number]   Admitting Diagnosis: Closed basicervical fracture of left femur    OT Date of Treatment: 17   OT Start Time: 1000  OT Stop Time: 1032  OT Total Time (min): 32 min    Billable Minutes:  Self Care/Home Management 16 and Therapeutic Activity 16    General Precautions: Standard, fall, hearing impaired, vision impaired  Orthopedic Precautions: LLE weight bearing as tolerated    Subjective:  Communicated with RN prior to session.    Pt agreeable to treatment    Pain Ratin/10    Objective:  Patient found with: telemetry     Functional Mobility:  Transfers:   Sit <> Stand Assistance: Moderate Assistance  Sit <> Stand Assistive Device: Standard Walker    Toilet Transfer Technique: Stand Pivot  Toilet Transfer Assistance: Moderate Assistance  Toilet Transfer Assistive Device: No Assistive Device    Functional Ambulation: ModA with SW 20 feet    Activities of Daily Living:  UE Dressing Level of Assistance: Moderate assistance    LE Dressing Level of Assistance: Total assistance    Grooming Position: Seated, bedside chair  Grooming Level of Assistance: Stand by assistance     Toileting Where Assessed: Bedside commode  Toileting Level of Assistance: Maximum assistance    AM-PAC 6 CLICK ADL   How much help from another person does this patient currently need?   1 = Unable, Total/Dependent Assistance  2 = A lot, Maximum/Moderate Assistance  3 = A little, Minimum/Contact Guard/Supervision  4 = None, Modified [place]/Independent    Putting on and taking off regular lower body clothing? : 2  Bathing (including washing, rinsing, drying)?: 2  Toileting, which includes using toilet, bedpan, or urinal? : 2  Putting on and taking off regular upper body clothing?: 2  Taking care of personal grooming such as brushing teeth?: 3  Eating meals?: 4  Total Score: 15     AM-PAC Raw Score CMS G-Code Modifier Level of Impairment Assistance   6 % Total /  Unable   7 - 9 CM 80 - 100% Maximal Assist   10 - 14 CL 60 - 80% Moderate Assist   15 - 19 CK 40 - 60% Moderate Assist   20 - 22 CJ 20 - 40% Minimal Assist   23 CI 1-20% SBA / CGA   24 CH 0% Independent/ Mod I     Patient left up in chair with all lines intact, call button in reach and RN notified    ASSESSMENT:  Zeina White is a 89 y.o. female with a medical diagnosis of Closed basicervical fracture of left femur and tolerated session well with good participation and would continue to benefit from OT services to maximize functional (I) and safety.    Rehab identified problem list/impairments: Rehab identified problem list/impairments: weakness, impaired endurance, impaired self care skills, impaired functional mobilty, gait instability, impaired balance, decreased lower extremity function, impaired cognition, decreased safety awareness, decreased ROM, orthopedic precautions, edema, impaired skin, pain    Rehab potential is good.    Activity tolerance: Good    Discharge recommendations: Discharge Facility/Level Of Care Needs: nursing facility, skilled     Barriers to discharge: Barriers to Discharge: Inaccessible home environment, Decreased caregiver support    Equipment recommendations: bedside commode, bath bench, walker, standard     GOALS:   Occupational Therapy Goals        Problem: Occupational Therapy Goal    Goal Priority Disciplines Outcome Interventions   Occupational Therapy Goal     OT, PT/OT     Description:  Goals to be met by: 04/10     Patient will increase functional independence with ADLs by performing:    UE Dressing with Stand-by Assistance.  LE Dressing with Moderate Assistance and Assistive Devices as needed.  Grooming while seated with Set-up Assistance.  Toileting from bedside commode with Moderate Assistance for hygiene and clothing management.   Stand pivot transfers with CGA.  Toilet transfer to bedside commode with CGA.                Plan:  Patient to be seen 5 x/week to address the  above listed problems via self-care/home management, therapeutic activities, therapeutic exercises  Plan of Care reviewed with: patient      NATHALIA Doyle  03/23/2017

## 2017-03-23 NOTE — SUBJECTIVE & OBJECTIVE
Interval History: Pt sitting up in bedside chair all morning. Says she is feeling much better. Seems agreeable to going to SNF.     Review of Systems   Constitutional: Negative for chills and fever.   Respiratory: Negative for cough, shortness of breath and wheezing.    Cardiovascular: Negative for chest pain and palpitations.   Gastrointestinal: Negative for abdominal pain, constipation, diarrhea and nausea.   Genitourinary: Negative for difficulty urinating, dysuria and frequency.     Objective:     Vital Signs (Most Recent):  Temp: 97.8 °F (36.6 °C) (03/23/17 1500)  Pulse: 83 (03/23/17 1500)  Resp: 14 (03/23/17 1500)  BP: 139/61 (03/23/17 1500)  SpO2: (!) 94 % (03/23/17 1500) Vital Signs (24h Range):  Temp:  [96.7 °F (35.9 °C)-98 °F (36.7 °C)] 97.8 °F (36.6 °C)  Pulse:  [] 83  Resp:  [14-18] 14  SpO2:  [92 %-95 %] 94 %  BP: (132-150)/(61-81) 139/61     Weight: 63.5 kg (140 lb)  Body mass index is 27.34 kg/(m^2).    Intake/Output Summary (Last 24 hours) at 03/23/17 1537  Last data filed at 03/23/17 1300   Gross per 24 hour   Intake              720 ml   Output             1150 ml   Net             -430 ml      Physical Exam   Constitutional: She is oriented to person, place, and time. She appears well-developed and well-nourished. No distress.   HENT:   Head: Normocephalic and atraumatic.   Cardiovascular: Normal rate and regular rhythm.    No murmur heard.  Pulmonary/Chest: Effort normal and breath sounds normal. She has no wheezes.   Abdominal: Soft. Bowel sounds are normal. She exhibits no distension. There is no tenderness.   Musculoskeletal: She exhibits tenderness (L hip).   Neurological: She is alert and oriented to person, place, and time.   Skin: Skin is warm and dry. No rash noted.

## 2017-03-23 NOTE — ASSESSMENT & PLAN NOTE
After speaking with family today, pt and her family would like to forgo further treatment for her diabetes as she is 89.

## 2017-03-23 NOTE — PT/OT/SLP PROGRESS
Physical Therapy  Treatment    June T Cindy   MRN: [id number]   Admitting Diagnosis: Closed basicervical fracture of left femur    PT Received On: 03/23/17  PT Start Time: 1000     PT Stop Time: 1032    PT Total Time (min): 32 min       Billable Minutes:  Gait [id number] and Therapeutic Exercise 16    Treatment Type: Treatment  PT/PTA: PT             General Precautions: Standard, fall, hearing impaired, vision impaired  Orthopedic Precautions: LLE weight bearing as tolerated   Braces: N/A         Subjective:  Communicated with nsg prior to session.  -Pt agreeable to PT session    Pain Rating: other (see comments) (did not indicate pain)              Pain Rating Post-Intervention: other (see comments)    Objective:   Patient found with: telemetry    Functional Mobility:  Bed Mobility:        Transfers:  Sit <> Stand Assistance: Minimum Assistance x2 trials from chair (verbal/tactile cues for hand placement and sequencing)  Sit <> Stand Assistive Device: Standard Walker    Toilet Transfer Technique: Stand Pivot  Toilet Transfer Assistance: Moderate Assistance x1 trial to commode, x1 trial back to chair  Toilet Transfer Assistive Device: No Assistive Device    Gait:   Gait Distance: 20' x1 trial  Assistance 1: Moderate assistance  Gait Assistive Device: Standard walker  Gait Pattern: 3-point gait  Gait Deviation(s): decreased zita, increased time in double stance, decreased velocity of limb motion, decreased step length, decreased stride length, decreased toe-to-floor clearance, decreased weight-shifting ability, foot flat     -Pt provided blocking at (L) knee to prevent buckling. Pt was also given frequent verbal/tactile cueing for sequencing and walker advancement.     Balance:   Static Sit: FAIR+: Able to take MINIMAL challenges from all directions  Dynamic Sit: FAIR+: Maintains balance through MINIMAL excursions of active trunk motion  Static Stand: FAIR+: Takes MINIMAL challenges from all directions  Dynamic  stand: FAIR: Needs CONTACT GUARD during gait     Therapeutic Activities and Exercises:  -Pt performed GS, QS,AP x30 reps with frequent cuing for attention to task and visual demonstration.    -Pt educated on:  A. DME mgmt  B. Importance of OOB activity to improve functional outcomes  C. Performing HEP to preven blood clots- pt given exercise sheet to review    -Pt with no LOB while using BSC. Pt able to perform courtney-care with PT providing min (A) in standing and using SW for support.    AM-PAC 6 CLICK MOBILITY  How much help from another person does this patient currently need?   1 = Unable, Total/Dependent Assistance  2 = A lot, Maximum/Moderate Assistance  3 = A little, Minimum/Contact Guard/Supervision  4 = None, Modified [place]/Independent    Turning over in bed (including adjusting bedclothes, sheets and blankets)?: 3  Sitting down on and standing up from a chair with arms (e.g., wheelchair, bedside commode, etc.): 3  Moving from lying on back to sitting on the side of the bed?: 3  Moving to and from a bed to a chair (including a wheelchair)?: 2  Need to walk in hospital room?: 2  Climbing 3-5 steps with a railing?: 2  Total Score: 15    AM-PAC Raw Score CMS G-Code Modifier Level of Impairment Assistance   6 % Total / Unable   7 - 9 CM 80 - 100% Maximal Assist   10 - 14 CL 60 - 80% Moderate Assist   15 - 19 CK 40 - 60% Moderate Assist   20 - 22 CJ 20 - 40% Minimal Assist   23 CI 1-20% SBA / CGA   24 CH 0% Independent/ Mod I     Patient left up in chair with all lines intact, call button in reach and nsg notified.    Assessment:  Zeina White is a 89 y.o. female with a medical diagnosis of Closed basicervical fracture of left femur and presents with improved functional mobility. Pt tolerated session well today with improved activity tolerance and endurance. Pt ambulated 20' today with mod (A) provided to block knee and for sequencing using SW. Pt also performed multiple t/f's and exercises. Pt  will cont to benefit from skilled therapy services and remains appropriate for SNF placement.    Rehab identified problem list/impairments: Rehab identified problem list/impairments: weakness, impaired endurance, impaired self care skills, impaired functional mobilty, gait instability, impaired balance, decreased safety awareness, decreased lower extremity function, decreased ROM, impaired joint extensibility, orthopedic precautions, pain    Rehab potential is good.    Activity tolerance: Good    Discharge recommendations: Discharge Facility/Level Of Care Needs: nursing facility, skilled     Barriers to discharge: Barriers to Discharge: Inaccessible home environment, Decreased caregiver support    Equipment recommendations: Equipment Needed After Discharge: bedside commode, bath bench, walker, standard     GOALS:   Physical Therapy Goals        Problem: Physical Therapy Goal    Goal Priority Disciplines Outcome Goal Variances Interventions   Physical Therapy Goal     PT/OT, PT Ongoing (interventions implemented as appropriate)     Description:  Goals to be met by: 3/30/2017     Patient will increase functional independence with mobility by performin. Supine to sit with Stand-by Assistance  2. Sit to supine with Stand-by Assistance  3. Sit to stand transfer with Stand-by Assistance  4. Gait  x 50 feet with Contact Guard Assistance using Standard Walker.   5. (I) with HEP                PLAN:    Patient to be seen daily  to address the above listed problems via gait training, therapeutic activities, therapeutic exercises, neuromuscular re-education  Plan of Care expires: 17  Plan of Care reviewed with: patient, daughter         Jaison Adam, PT  2017

## 2017-03-24 VITALS
HEART RATE: 92 BPM | WEIGHT: 140 LBS | RESPIRATION RATE: 16 BRPM | DIASTOLIC BLOOD PRESSURE: 66 MMHG | HEIGHT: 60 IN | BODY MASS INDEX: 27.48 KG/M2 | TEMPERATURE: 97 F | OXYGEN SATURATION: 92 % | SYSTOLIC BLOOD PRESSURE: 142 MMHG

## 2017-03-24 LAB
BASOPHILS # BLD AUTO: 0.07 K/UL
BASOPHILS NFR BLD: 0.9 %
DIFFERENTIAL METHOD: ABNORMAL
EOSINOPHIL # BLD AUTO: 0.3 K/UL
EOSINOPHIL NFR BLD: 3.9 %
ERYTHROCYTE [DISTWIDTH] IN BLOOD BY AUTOMATED COUNT: 13.4 %
HCT VFR BLD AUTO: 34.2 %
HGB BLD-MCNC: 11.4 G/DL
LYMPHOCYTES # BLD AUTO: 2.1 K/UL
LYMPHOCYTES NFR BLD: 26.7 %
MCH RBC QN AUTO: 29.5 PG
MCHC RBC AUTO-ENTMCNC: 33.3 %
MCV RBC AUTO: 88 FL
MONOCYTES # BLD AUTO: 1.1 K/UL
MONOCYTES NFR BLD: 14.2 %
NEUTROPHILS # BLD AUTO: 4.2 K/UL
NEUTROPHILS NFR BLD: 53.9 %
PLATELET # BLD AUTO: 160 K/UL
PMV BLD AUTO: 11.8 FL
RBC # BLD AUTO: 3.87 M/UL
WBC # BLD AUTO: 7.72 K/UL

## 2017-03-24 PROCEDURE — 97530 THERAPEUTIC ACTIVITIES: CPT

## 2017-03-24 PROCEDURE — 97116 GAIT TRAINING THERAPY: CPT

## 2017-03-24 PROCEDURE — 36415 COLL VENOUS BLD VENIPUNCTURE: CPT

## 2017-03-24 PROCEDURE — 99239 HOSP IP/OBS DSCHRG MGMT >30: CPT | Mod: ,,, | Performed by: HOSPITALIST

## 2017-03-24 PROCEDURE — 25000003 PHARM REV CODE 250: Performed by: ORTHOPAEDIC SURGERY

## 2017-03-24 PROCEDURE — 97535 SELF CARE MNGMENT TRAINING: CPT

## 2017-03-24 PROCEDURE — 63600175 PHARM REV CODE 636 W HCPCS: Performed by: HOSPITALIST

## 2017-03-24 PROCEDURE — 85025 COMPLETE CBC W/AUTO DIFF WBC: CPT

## 2017-03-24 PROCEDURE — 86580 TB INTRADERMAL TEST: CPT | Performed by: HOSPITALIST

## 2017-03-24 PROCEDURE — 25000003 PHARM REV CODE 250: Performed by: HOSPITALIST

## 2017-03-24 RX ORDER — QUETIAPINE FUMARATE 25 MG/1
25 TABLET, FILM COATED ORAL ONCE
Status: COMPLETED | OUTPATIENT
Start: 2017-03-24 | End: 2017-03-24

## 2017-03-24 RX ORDER — DOCUSATE SODIUM 100 MG/1
100 CAPSULE, LIQUID FILLED ORAL 2 TIMES DAILY
Refills: 0 | COMMUNITY
Start: 2017-03-24 | End: 2017-04-25

## 2017-03-24 RX ORDER — SIMVASTATIN 40 MG/1
40 TABLET, FILM COATED ORAL DAILY
Qty: 90 TABLET | Refills: 0 | Status: SHIPPED | OUTPATIENT
Start: 2017-03-24 | End: 2018-07-27

## 2017-03-24 RX ORDER — ENOXAPARIN SODIUM 100 MG/ML
40 INJECTION SUBCUTANEOUS DAILY
Qty: 1 SYRINGE | Refills: 1 | Status: SHIPPED | OUTPATIENT
Start: 2017-03-24 | End: 2017-04-25 | Stop reason: ALTCHOICE

## 2017-03-24 RX ADMIN — ACETAMINOPHEN 650 MG: 325 TABLET ORAL at 10:03

## 2017-03-24 RX ADMIN — ACETAMINOPHEN 650 MG: 325 TABLET ORAL at 04:03

## 2017-03-24 RX ADMIN — FAMOTIDINE 20 MG: 20 TABLET, FILM COATED ORAL at 08:03

## 2017-03-24 RX ADMIN — ACETAMINOPHEN 650 MG: 325 TABLET ORAL at 06:03

## 2017-03-24 RX ADMIN — DOCUSATE SODIUM 100 MG: 100 CAPSULE, LIQUID FILLED ORAL at 08:03

## 2017-03-24 RX ADMIN — TUBERCULIN PURIFIED PROTEIN DERIVATIVE 5 UNITS: 5 INJECTION, SOLUTION INTRADERMAL at 11:03

## 2017-03-24 RX ADMIN — LISINOPRIL 40 MG: 20 TABLET ORAL at 08:03

## 2017-03-24 RX ADMIN — QUETIAPINE FUMARATE 25 MG: 25 TABLET, FILM COATED ORAL at 04:03

## 2017-03-24 NOTE — PLAN OF CARE
[facility]    Carleen Russo LMSW  [phone]

## 2017-03-24 NOTE — PROGRESS NOTES
[facility] Hospital Medicine  Progress Note    Patient Name: Zeina White  MRN: [id number]  Patient Class: IP- Inpatient   Admission Date: 3/21/2017  Length of Stay: 3 days  Attending Physician: Deborah Castle MD  Primary Care Provider: Alan Kaplan MD    [place] [facility] Medicine Team: [facility]OSP MED  Deborah Castle MD    Subjective:     Principal Problem:Closed basicervical fracture of left femur    HPI:  Zeina White is a 89 y.o. female with a PMH of HTN, DM2, and dementia. The patient presented to [facility] on 3/21/2017 due to pain in her left hip following a fall in her bedroom.  Pt states she got up in the middle of the night because she thought she heard voices and wanted to see who was there. She denies hitting her head or any loss of consciousness. Imaging in the ER revealed a left femoral neck fracture.     Pt lives with her daughter and is very [place]. She is able to ambulate around the house, but uses a cane or walker for when they leave the house. She is able to feed herself, but requires assistance with her other ADLs.  She denies any history of heart disease or previous heart attack.  No history of chest pains.         Hospital Course:  [facility]  Ortho was consulted and the pt underwent cephalomedullary nail fixation of left basicervical femoral neck fracture by Dr Presley Cornejo on 3/21/2017.  Post op pt is WBAT to LLE per ortho recs.  PT/OT consulted and are recommending SNF. Lovenox for DVT ppx to be constiued for 28 days post op (END DATE: 4/20/2017).     Interval History: Pt sitting up in bedside chair all morning again today. Says she is feeling well. Seems agreeable to going to SNF.     Review of Systems   Constitutional: Negative for chills and fever.   Respiratory: Negative for cough, shortness of breath and wheezing.    Cardiovascular: Negative for chest pain and palpitations.   Gastrointestinal: Negative for abdominal pain,  constipation, diarrhea and nausea.   Genitourinary: Negative for difficulty urinating, dysuria and frequency.     Objective:     Vital Signs (Most Recent):  Temp: 97.7 °F (36.5 °C) (03/24/17 1100)  Pulse: 92 (03/24/17 1100)  Resp: 18 (03/24/17 1100)  BP: 133/61 (03/24/17 1100)  SpO2: (!) 94 % (03/24/17 1100) Vital Signs (24h Range):  Temp:  [96.8 °F (36 °C)-98.2 °F (36.8 °C)] 97.7 °F (36.5 °C)  Pulse:  [83-94] 92  Resp:  [14-18] 18  SpO2:  [92 %-97 %] 94 %  BP: (133-170)/(61-75) 133/61     Weight: 63.5 kg (140 lb)  Body mass index is 27.34 kg/(m^2).    Intake/Output Summary (Last 24 hours) at 03/24/17 1337  Last data filed at 03/24/17 1300   Gross per 24 hour   Intake              740 ml   Output              400 ml   Net              340 ml      Physical Exam   Constitutional: She is oriented to person, place, and time. She appears well-developed and well-nourished. No distress.   HENT:   Head: Normocephalic and atraumatic.   Cardiovascular: Normal rate and regular rhythm.    No murmur heard.  Pulmonary/Chest: Effort normal and breath sounds normal. She has no wheezes.   Abdominal: Soft. Bowel sounds are normal. She exhibits no distension. There is no tenderness.   Musculoskeletal: She exhibits tenderness (L hip).   Neurological: She is alert and oriented to person, place, and time.   Skin: Skin is warm and dry. No rash noted.       Assessment/Plan:      * Closed basicervical fracture of left femur  Encounter for aftercare for healing closed traumatic fracture of lower extremity  [facility].  Ortho was consulted and the pt underwent cephalomedullary nail fixation of left basicervical femoral neck fracture by Dr Presley Cornejo on 3/21/2017.  Post op pt is WBAT to LLE per ortho recs.  PT/OT consulted and are recommending SNF. Lovenox for DVT ppx to be constiued for 28 days post op (END DATE: 4/20/2017).     Type II diabetes mellitus with neurological manifestations  After speaking  with family , pt and her family would like to forgo further treatment for her diabetes as she is 89.      Essential hypertension  Continue home meds      Dementia  stable      VTE Risk Mitigation         Ordered     enoxaparin injection 40 mg  Every 24 hours (non-standard times)     Route:  Subcutaneous        03/21/17 1723     Medium Risk of VTE  Once      03/21/17 1724          Deborah Castle MD  Department of Hospital Medicine   [facility]

## 2017-03-24 NOTE — ASSESSMENT & PLAN NOTE
Encounter for aftercare for healing closed traumatic fracture of lower extremity  Pt was [facility] to [facility] and medically optimized.  Ortho was consulted and the pt underwent cephalomedullary nail fixation of left basicervical femoral neck fracture by Dr Presley Cornejo on 3/21/2017.  Post op pt is WBAT to LLE per ortho recs.  PT/OT consulted and are recommending SNF. Lovenox for DVT ppx to be constiued for 28 days post op (END DATE: 4/20/2017).

## 2017-03-24 NOTE — PLAN OF CARE
FRANTZ spoke with Romana at [facility] about pt's discharge, Romana states pt looks good to transfer today. FRANTZ will confirm medical stability with MD and follow up accordingly. Transfer to [facility] would also be pending [employer] authorization.    Addendum 9:00 AM  FRANTZ informed by MD that pt is medically stable to d/c, FRANTZ informed Romana at [facility]. Pending [employer] auth.     Carleen Russo LMSW  [phone]

## 2017-03-24 NOTE — ASSESSMENT & PLAN NOTE
After speaking with family , pt and her family would like to forgo further treatment for her diabetes as she is 89.

## 2017-03-24 NOTE — DISCHARGE SUMMARY
[facility] Hospital Medicine  Discharge Summary      Patient Name: Zeina White  MRN: [id number]  Admission Date: 3/21/2017  Hospital Length of Stay: 3 days  Discharge Date and Time:  03/24/2017 1:41 PM  Attending Physician: Deborah Castle MD   Discharging Provider: Deborah Castle MD  Primary Care Provider: Alan Kaplan MD  [facility] Medicine Team: [facility] Deborah Castle MD    HPI:   Zeina White is a 89 y.o. female with a PMH of HTN, DM2, and dementia. The patient presented to [facility] on 3/21/2017 due to pain in her left hip following a fall in her bedroom.  Pt states she got up in the middle of the night because she thought she heard voices and wanted to see who was there. She denies hitting her head or any loss of consciousness. Imaging in the ER revealed a left femoral neck fracture.     Pt lives with her daughter and is very [place]. She is able to ambulate around the house, but uses a cane or walker for when they leave the house. She is able to feed herself, but requires assistance with her other ADLs.  She denies any history of heart disease or previous heart attack.  No history of chest pains.         Procedure(s) (LRB):  IM NAILING OF FEMUR (Left)      Indwelling Lines/Drains at time of discharge:   Lines/Drains/Airways          No matching active lines, drains, or airways        Hospital Course:   Pt was admitted to [facility] and medically optimized.  Ortho was consulted and the pt underwent cephalomedullary nail fixation of left basicervical femoral neck fracture by Dr Presley Cornejo on 3/21/2017.  Post op pt is WBAT to LLE per ortho recs.  PT/OT consulted and are recommending SNF. Lovenox for DVT ppx to be constiued for 28 days post op (END DATE: 4/20/2017).      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  Ortho was consulted and the pt underwent cephalomedullary nail fixation of left basicervical femoral neck fracture by Dr Presley Cornejo on 3/21/2017.  Post op pt is WBAT to LLE per ortho recs.  PT/OT consulted and are recommending SNF. Lovenox for DVT ppx to be constiued for 28 days post op (END DATE: 4/20/2017).     Type II diabetes mellitus with neurological manifestations  After speaking with family , pt and her family would like to forgo further treatment for her diabetes as she is 89.      Essential hypertension  Continue home meds      Dementia  stable        Discharged Condition: good    Disposition: Home or Self Care    Follow Up:  Follow-up Information     Follow up with [facility] In 2 weeks.    Specialty:  Orthopedics    Contact information:    [address] [facility] [id number] [phone]        Follow up with Alan Kaplan MD In 2 weeks.    Specialty:  Internal Medicine    Contact information:    [address] [facility] [id number] [phone]          Patient Instructions:     Diet general      Diet general       Medications:  Reconciled Home Medications:   Current Discharge Medication List      START taking these medications    Details   docusate sodium (COLACE) 100 MG capsule Take 1 capsule (100 mg total) by mouth 2 (two) times daily.  Refills: 0      enoxaparin (LOVENOX) 40 mg/0.4 mL Syrg Inject 0.4 mLs (40 mg total) into the skin once daily.  Qty: 1 Syringe, Refills: 1         CONTINUE these medications which have CHANGED    Details   simvastatin (ZOCOR) 40 MG tablet Take 1 tablet (40 mg total) by mouth once daily.  Qty: 90 tablet, Refills: 0         CONTINUE these medications which have NOT CHANGED    Details   lisinopril (PRINIVIL,ZESTRIL) 40 MG tablet TAKE 1 TABLET(40 MG) BY MOUTH EVERY DAY  Qty: 90 tablet, Refills: 3      meclizine (ANTIVERT) 12.5 mg tablet Take 1 tablet (12.5 mg total) by mouth 3 (three) times daily as needed for Dizziness.  Qty: 20 tablet, Refills: 0      metformin (GLUCOPHAGE) 500 MG tablet TK 1 T PO BID WITH MEALS  Refills: 2      nortriptyline (PAMELOR) 10 MG capsule Take 1 capsule (10 mg total) by mouth every evening.  Qty: 90 capsule, Refills: 11      ranitidine (ZANTAC) 300 MG tablet TAKE 1 TABLET BY MOUTH EVERY EVENING  Qty: 90 tablet, Refills: 3         STOP taking these medications       BLOOD SUGAR DIAGNOSTIC (ONE TOUCH ULTRA TEST MISC) Comments:   Reason for Stopping:         blood sugar diagnostic (ONETOUCH ULTRA TEST) Strp Comments:   Reason for Stopping:         diphenoxylate-atropine 2.5-0.025 mg (LOMOTIL) 2.5-0.025 mg per tablet Comments:   Reason for Stopping:         lancets (ONETOUCH DELICA LANCETS) 33 gauge Misc Comments:   Reason for Stopping:         ONE TOUCH DELICA LANCETS 33 gauge Misc Comments:   Reason for Stopping:         primidone (MYSOLINE) 50 MG Tab Comments:   Reason for Stopping:         sulindac (CLINORIL) 150 MG tablet Comments:   Reason for Stopping:             Time spent on the discharge of patient: 35 minutes    Deborah Castle  MD  Department of Hospital Medicine  [facility]

## 2017-03-24 NOTE — NURSING
Patient's daughter stated that she didn't want her mother's vital signs taken until 4:00 am because she wanted her mother to be able to rest and not be disturbed.

## 2017-03-24 NOTE — PLAN OF CARE
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       Patient is homebound due to:  Closed basicervical fracture of left femur    Allergies:  Review of patient's allergies indicates:   Allergen Reactions    Naproxen      Other reaction(s): Jittery       Vitals:  Every shift (Skilled Nursing patients)    Diet: Dental soft    Supplement:  1 can every three times a day with meals                         Type:  Glucerna      Acitivities:     - Up in a chair each morning as tolerated   - Ambulate with assistance to bathroom   - Weight bearing: WBAT to LLE     LABS:  Per facility protocol   CMP, CBC each month for 3 months   TSH every year     Nursing Precautions:    - Aspiration precautions:             - Total assistance with meals            -  Upright 90 degrees befor during and after meals             -  Suction at bedside          - Fall precautions per nursing home protocol   - Decubitus precautions:        -  for positioning   - Pressure reducing foam mattress   - Turn patient every two hours. Use wedge pillows to anchor patient    CONSULTS:    Physical Therapy to evaluate and treat  5X / week   Occupational  Therapy to evaluate and treat 5X / week   Speech Therapy  to evaluate and treat 5X / week      Medications: Discontinue all previous medication orders, if any. See new list below.     Cindy June T   Home Medication Instructions SATISH:[id number]    Printed on:03/24/17 1001   Medication Information                      docusate sodium (COLACE) 100 MG capsule  Take 1 capsule (100 mg total) by mouth 2 (two) times daily.             enoxaparin (LOVENOX) 40 mg/0.4 mL Syrg  Inject 0.4 mLs (40 mg total) into the skin once daily.  END DATE: 4/20/2017             lisinopril (PRINIVIL,ZESTRIL) 40 MG tablet  TAKE 1 TABLET(40 MG) BY MOUTH EVERY DAY             meclizine (ANTIVERT) 12.5 mg tablet  Take 1 tablet (12.5 mg total) by mouth 3 (three) times daily as needed for Dizziness.             metformin (GLUCOPHAGE) 500 MG tablet  TK 1 T PO BID WITH MEALS             nortriptyline (PAMELOR) 10 MG capsule  Take 1 capsule (10 mg total) by mouth every evening.             ranitidine (ZANTAC) 300 MG tablet  TAKE 1 TABLET BY MOUTH EVERY EVENING             simvastatin (ZOCOR) 40 MG tablet  Take 1 tablet (40 mg total) by mouth once daily.                       _________________________________  Deborah Castle MD  03/24/2017

## 2017-03-24 NOTE — ASSESSMENT & PLAN NOTE
Encounter for aftercare for healing closed traumatic fracture of lower extremity  Pt was admitted to [facility] and medically optimized.  Ortho was consulted and the pt underwent cephalomedullary nail fixation of left basicervical femoral neck fracture by Dr Presley Cornejo on 3/21/2017.  Post op pt is WBAT to LLE per ortho recs.  PT/OT consulted and are recommending SNF. Lovenox for DVT ppx to be constiued for 28 days post op (END DATE: 4/20/2017).

## 2017-03-24 NOTE — PLAN OF CARE
Problem: Patient Care Overview  Goal: Plan of Care Review  Outcome: Ongoing (interventions implemented as appropriate)  Pt is AAO x3.  VSS.  No injury/falls this shift.  Pt calls for assistance when needed and uses walker for assistance with mobility.  Pain being monitored and controlled via PRN meds.  No s/s of infection noted.  No s/s of skin breakdown noted.  Bed lowest position, call light within reach, siderails up x2.

## 2017-03-24 NOTE — PLAN OF CARE
FRANTZ informed by Rangel at [facility] that they would like transport scheduled for 4:30pm. Rangel stated report can be called to [phone], ask for RN on 4th floor, please call report after 4pm.  Pt will be in room 407B.     FRANTZ spoke with pt's RN to confirm d/c plan and time. FRANTZ provided number for report as above.    SW set up wheelchair transport with [facility] ([phone]) for 4:30pm.     FRANTZ informed pt and pt's daughter at bedside of d/c plan and time, both were agreeable.    Carleen Russo LMSW  [id number]

## 2017-03-24 NOTE — PLAN OF CARE
POD 3 s/p left femur IM nailing. PT/OT recommended SNF placement. Plans to transfer patient to [facility] today. SW and CM will continue to follow for any additional needs. Discharge and follow-up instructions to be completed by the bedside nurse.    Future Appointments  Date Time Provider Department Center   5/24/2017 10:15 AM [address] Jr., DPM [facility] POD [place]      03/24/17 1342   Final Note   Assessment Type Final Discharge Note   Discharge Disposition SNF  ([facility])   Discharge planning education complete? Yes   What phone number can be called within the next 1-3 days to see how you are doing after discharge? ([phone])   Hospital Follow Up  Appt(s) scheduled? Yes   Discharge plans and expectations educations in teach back method with documentation complete? Yes   Offered [facility] Bedside Delivery? Yes   Discharge/Hospital Encounter Summary to ([facility]) PCP n/a   Referral to Outpatient Case Management complete? n/a   Referral to / orders for Home Health Complete? n/a   30 day supply of medicines given at discharge, if documented non-compliance / non-adherence? n/a   Any social issues identified prior to discharge? No   Did you assess the readiness or willingness of the family or caregiver to support self management of care? Yes

## 2017-03-24 NOTE — PLAN OF CARE
FRANTZ spoke with Stan at [facility] ([phone]) who stated she met with the family at bedside and they were concerns about pt having a private room at [facility]. Stan stated they do not have private rooms currently at [facility]. FRANTZ spoke with Romana at [facility] who stated they also do not have any private rooms currently.    FRANTZ met with pt and daughter at bedside to inform that there were not any private beds at [facility] either. Pt's daughter was on the phone with her sister discussing and asked FRANTZ to return shortly.    Addendum 9:57 AM  FRANTZ spoke with pt's daughter Destiny who stated they have decided to go to [facility]. FRANTZ spoke with MD who will write plan of care orders shortly. FRANTZ sent ppd, 142, pasrr via [employer]. Will send orders once written.    FRANTZ spoke with Stan at [facility] who stated they submitted for [employer] auth and will inform FRANTZ once auth is approved.    FRANTZ informed Romana at [facility] that pt will d/c to [facility] instead.    Carleen Russo, EMILY  [id number]

## 2017-03-24 NOTE — PROGRESS NOTES
Daily Orthopaedic Progress Note    Zeina White is a 89 y.o. female admitted on 3/21/2017  Hospital Day: 3  Post Op Day: 3 Days Post-Op      The patient was seen and examined this morning at the bedside. RASHIDA overnight. Pain controlled. More alert this AM. Walked to rainey yesterday with PT    +voiding   +flatus  +Tolerating PO  - fevers/chills     PHYSICAL EXAM:  Awake/alert/oriented x3, No acute distress, Afebrile, Vital signs stable  Good inspiratory effort with unlaboured breathing  Dressings Clean,Dry,Intact  Palpable distal pulses  Neurovascularly intact      Vitals:    03/23/17 1500 03/23/17 2023 03/24/17 0612 03/24/17 0730   BP: 139/61 (!) 147/65 (!) 170/75 (!) 152/70   BP Location: Right arm Right arm Right arm Right arm   Patient Position: Lying Lying Lying Lying   BP Method: Automatic Automatic Automatic Automatic   Pulse: 83 85 94 92   Resp: 14 17  16   Temp: 97.8 °F (36.6 °C) 98.2 °F (36.8 °C) 98.1 °F (36.7 °C) 96.8 °F (36 °C)   TempSrc: Oral Oral Oral Oral   SpO2: (!) 94% 97% 95% (!) 92%   Weight:       Height:         I/O last 3 completed shifts:  In: 1080 [P.O.:1080]  Out: 1550 [Urine:1550]    Recent Labs  Lab 03/21/17  1719 03/22/17  0937   CALCIUM 8.4* 8.4*    137   K 4.2 3.7   CO2 21* 24    101   BUN 16 14   CREATININE 0.7 0.8       Recent Labs  Lab 03/22/17  0937 03/23/17  0601 03/24/17  0435   WBC 8.27 6.77 7.72   RBC 3.76* 3.59* 3.87*   HGB 11.0* 10.8* 11.4*   HCT 33.9* 31.5* 34.2*    169 160     No results for input(s): INR, APTT in the last 72 hours.    Invalid input(s): PT    A/P: 89 y.o. female 3 Days Post-Op s/p left IMN for basicervical femoral neck fx   -Continue with current pain control regimen  -Continue with current physical therapy plan, WBAT  -Continue with DVT prophylaxis, Lovenox daily      Dispo: likely will need SNF    Ha Her M.D. PGY3  [facility]   Orthopaedic Surgery Resident    Attg Note:  I agree with the above assessment and  plan.    Presley Cornejo MD

## 2017-03-24 NOTE — PLAN OF CARE
Medicare discharge appeals right discussed with patient. IMM signed and placed in patient's chart. Patient verbalized understanding of IMM rights.     03/24/17 1000   Medicare Message   Important Message from Medicare regarding Discharge Appeal Rights Given to patient/caregiver;Explained to patient/caregiver;Signed/date by patient/caregiver   Date IMM was signed 03/24/17   Time IMM was signed 1000

## 2017-03-24 NOTE — PT/OT/SLP PROGRESS
Physical Therapy  Treatment    June T Cindy   MRN: [id number]   Admitting Diagnosis: Closed basicervical fracture of left femur    PT Received On: 03/24/17  PT Start Time: 1000     PT Stop Time: 1020    PT Total Time (min): 20 min       Billable Minutes:  Gait [id number]    Treatment Type: Treatment  PT/PTA: PT             General Precautions: Standard, fall, hearing impaired, vision impaired  Orthopedic Precautions: LLE weight bearing as tolerated   Braces: N/A         Subjective:  Communicated with nsg prior to session.  -Pt agreeable to PT session    Pain Rating: other (see comments) (did not indicate pain)              Pain Rating Post-Intervention: other (see comments) (did not indicate pain)    Objective:   Patient found with: telemetry    Functional Mobility:  Bed Mobility:   Scooting/Bridging: Minimum Assistance  Supine to Sit: Minimum Assistance (leg lift assist)    Transfers:  Sit <> Stand Assistance: Minimum Assistance x1 trial from EOB, x1 trial from chair  Sit <> Stand Assistive Device: Standard Walker  Bed <> Chair Technique: Stand Pivot  Bed <> Chair Assistance: Moderate Assistance  Bed <> Chair Assistive Device: Standard Walker    Gait:   Gait Distance: 10' x1 trial  Assistance 1: Moderate assistance  Gait Assistive Device: Standard walker  Gait Pattern: 3-point gait  Gait Deviation(s): decreased zita, increased time in double stance, decreased velocity of limb motion, decreased step length, decreased stride length, decreased toe-to-floor clearance, decreased weight-shifting ability, foot flat     -Pt with increased difficulty advancing (L) LE during ambulation and was given assistance from PT. Pt also demo short step length with narrow DEMETRIO.     Balance:   Static Sit: FAIR+: Able to take MINIMAL challenges from all directions  Dynamic Sit: FAIR+: Maintains balance through MINIMAL excursions of active trunk motion  Static Stand: FAIR+: Takes MINIMAL challenges from all directions  Dynamic stand:  FAIR: Needs CONTACT GUARD during gait     Therapeutic Activities and Exercises:  -Pt educated on:  A. Gait sequence  B. DME mgmt  C. Performing HEP  D. Importance of OOB activity to improve functional outcomes     AM-PAC 6 CLICK MOBILITY  How much help from another person does this patient currently need?   1 = Unable, Total/Dependent Assistance  2 = A lot, Maximum/Moderate Assistance  3 = A little, Minimum/Contact Guard/Supervision  4 = None, Modified [place]/Independent    Turning over in bed (including adjusting bedclothes, sheets and blankets)?: 3  Sitting down on and standing up from a chair with arms (e.g., wheelchair, bedside commode, etc.): 3  Moving from lying on back to sitting on the side of the bed?: 3  Moving to and from a bed to a chair (including a wheelchair)?: 2  Need to walk in hospital room?: 2  Climbing 3-5 steps with a railing?: 2  Total Score: 15    AM-PAC Raw Score CMS G-Code Modifier Level of Impairment Assistance   6 % Total / Unable   7 - 9 CM 80 - 100% Maximal Assist   10 - 14 CL 60 - 80% Moderate Assist   15 - 19 CK 40 - 60% Moderate Assist   20 - 22 CJ 20 - 40% Minimal Assist   23 CI 1-20% SBA / CGA   24 CH 0% Independent/ Mod I     Patient left up in chair with all lines intact, call button in reach and nsg notified.    Assessment:  Zeina White is a 89 y.o. female with a medical diagnosis of Closed basicervical fracture of left femur and presents with impaired functional mobility. Pt tolerated session fairly today. Pt with inc difficulty performing ambulation today 2/2 stiffness and weakness in (L) LE. Pt required assistance from PT to advance (L) LE during ambulation but experienced no LOB. Pt will cont to benefit from skilled therapy services and is progressing well. Pt remains appropriate for SNF placement upon d/c.     Rehab identified problem list/impairments: Rehab identified problem list/impairments: weakness, impaired endurance, impaired functional mobilty,  impaired self care skills, gait instability, impaired balance, decreased safety awareness, decreased lower extremity function, decreased ROM, impaired joint extensibility, orthopedic precautions    Rehab potential is good.    Activity tolerance: Good    Discharge recommendations: Discharge Facility/Level Of Care Needs: nursing facility, skilled     Barriers to discharge: Barriers to Discharge: Decreased caregiver support, Inaccessible home environment    Equipment recommendations: Equipment Needed After Discharge: bedside commode, bath bench, walker, standard     GOALS:   Physical Therapy Goals        Problem: Physical Therapy Goal    Goal Priority Disciplines Outcome Goal Variances Interventions   Physical Therapy Goal     PT/OT, PT Ongoing (interventions implemented as appropriate)     Description:  Goals to be met by: 3/30/2017     Patient will increase functional independence with mobility by performin. Supine to sit with Stand-by Assistance  2. Sit to supine with Stand-by Assistance  3. Sit to stand transfer with Stand-by Assistance  4. Gait  x 50 feet with Contact Guard Assistance using Standard Walker.   5. (I) with HEP                PLAN:    Patient to be seen daily  to address the above listed problems via gait training, therapeutic activities, therapeutic exercises, neuromuscular re-education  Plan of Care expires: 17  Plan of Care reviewed with: patient, daughter         Jaison Adam, PT  2017

## 2017-03-24 NOTE — SUBJECTIVE & OBJECTIVE
Interval History: Pt sitting up in bedside chair all morning again today. Says she is feeling well. Seems agreeable to going to SNF.     Review of Systems   Constitutional: Negative for chills and fever.   Respiratory: Negative for cough, shortness of breath and wheezing.    Cardiovascular: Negative for chest pain and palpitations.   Gastrointestinal: Negative for abdominal pain, constipation, diarrhea and nausea.   Genitourinary: Negative for difficulty urinating, dysuria and frequency.     Objective:     Vital Signs (Most Recent):  Temp: 97.7 °F (36.5 °C) (03/24/17 1100)  Pulse: 92 (03/24/17 1100)  Resp: 18 (03/24/17 1100)  BP: 133/61 (03/24/17 1100)  SpO2: (!) 94 % (03/24/17 1100) Vital Signs (24h Range):  Temp:  [96.8 °F (36 °C)-98.2 °F (36.8 °C)] 97.7 °F (36.5 °C)  Pulse:  [83-94] 92  Resp:  [14-18] 18  SpO2:  [92 %-97 %] 94 %  BP: (133-170)/(61-75) 133/61     Weight: 63.5 kg (140 lb)  Body mass index is 27.34 kg/(m^2).    Intake/Output Summary (Last 24 hours) at 03/24/17 1337  Last data filed at 03/24/17 1300   Gross per 24 hour   Intake              740 ml   Output              400 ml   Net              340 ml      Physical Exam   Constitutional: She is oriented to person, place, and time. She appears well-developed and well-nourished. No distress.   HENT:   Head: Normocephalic and atraumatic.   Cardiovascular: Normal rate and regular rhythm.    No murmur heard.  Pulmonary/Chest: Effort normal and breath sounds normal. She has no wheezes.   Abdominal: Soft. Bowel sounds are normal. She exhibits no distension. There is no tenderness.   Musculoskeletal: She exhibits tenderness (L hip).   Neurological: She is alert and oriented to person, place, and time.   Skin: Skin is warm and dry. No rash noted.

## 2017-03-24 NOTE — PLAN OF CARE
"FRANTZ spoke with Rangel at [facility] who requested that pt's orders state "5x week" for all therapy orders. She also asked about pt not prescribed any pain meds.    FRANTZ spoke with MD who will update orders. MD also stated pt is not taking any pain meds currently.    FRANTZ received a call from [facility] stating they have authorized pt's SNF stay, Auth # [id number].    FRANTZ informed MD of the above as well.    Carleen Russo, EMILY  [id number]    "

## 2017-03-24 NOTE — PLAN OF CARE
Problem: Physical Therapy Goal  Goal: Physical Therapy Goal  Goals to be met by: 3/30/2017     Patient will increase functional independence with mobility by performin. Supine to sit with Stand-by Assistance  2. Sit to supine with Stand-by Assistance  3. Sit to stand transfer with Stand-by Assistance  4. Gait x 50 feet with Contact Guard Assistance using Standard Walker.   5. (I) with HEP   Outcome: Ongoing (interventions implemented as appropriate)  Pt tolerated session fairly today. Pt with inc difficulty performing ambulation today 2/2 stiffness and weakness in (L) LE. Pt required assistance from PT to advance (L) LE during ambulation but experienced no LOB. Pt will cont to benefit from skilled therapy services and is progressing well. Pt remains appropriate for SNF placement upon d/c.

## 2017-03-25 LAB
BLD PROD TYP BPU: NORMAL
BLD PROD TYP BPU: NORMAL
BLOOD UNIT EXPIRATION DATE: NORMAL
BLOOD UNIT EXPIRATION DATE: NORMAL
BLOOD UNIT TYPE CODE: 6200
BLOOD UNIT TYPE CODE: 6200
BLOOD UNIT TYPE: NORMAL
BLOOD UNIT TYPE: NORMAL
CODING SYSTEM: NORMAL
CODING SYSTEM: NORMAL
DISPENSE STATUS: NORMAL
DISPENSE STATUS: NORMAL
TRANS ERYTHROCYTES VOL PATIENT: NORMAL ML
TRANS ERYTHROCYTES VOL PATIENT: NORMAL ML

## 2017-03-27 ENCOUNTER — PATIENT OUTREACH (OUTPATIENT)
Dept: ADMINISTRATIVE | Facility: CLINIC | Age: 82
End: 2017-03-27
Payer: MEDICARE

## 2017-04-04 ENCOUNTER — OFFICE VISIT (OUTPATIENT)
Dept: ORTHOPEDICS | Facility: CLINIC | Age: 82
End: 2017-04-04
Payer: MEDICARE

## 2017-04-04 VITALS
SYSTOLIC BLOOD PRESSURE: 151 MMHG | HEART RATE: 93 BPM | BODY MASS INDEX: 27.48 KG/M2 | DIASTOLIC BLOOD PRESSURE: 73 MMHG | WEIGHT: 140 LBS | HEIGHT: 60 IN | RESPIRATION RATE: 16 BRPM

## 2017-04-04 DIAGNOSIS — S72.042A: Primary | ICD-10-CM

## 2017-04-04 PROCEDURE — 99024 POSTOP FOLLOW-UP VISIT: CPT | Mod: S$GLB,,, | Performed by: PHYSICIAN ASSISTANT

## 2017-04-04 PROCEDURE — 99999 PR PBB SHADOW E&M-EST. PATIENT-LVL III: CPT | Mod: PBBFAC,,, | Performed by: PHYSICIAN ASSISTANT

## 2017-04-05 ENCOUNTER — DOCUMENTATION ONLY (OUTPATIENT)
Dept: ORTHOPEDICS | Facility: CLINIC | Age: 82
End: 2017-04-05

## 2017-04-05 NOTE — PROGRESS NOTES
Called and Informed patient daughterr of an reschedule appointment, due to COLBY Villasenor PA-C will not be in the office 05/02/17 . New appt 05/09/17at 1030/mailed.  Patient daughter states verbal understanding and has no further questions.

## 2017-04-06 NOTE — PROGRESS NOTES
Ms. White is an 89-year-old female who fell from a standing height resulting in a displaced left basicervical femoral neck  Fracture. Treated with CM nail 03/21/2107 by .     She is  here today for a post-operative visit after a  Cephalomedullary nail fixation of left basicervical femoral neck fracture  by Dr. Cornejo  on 03/21/2017.      she reports that she is doing well.  Pain is controlled.  she is  taking pain medication, tylenol PRN.  She is currently at [facility]. She is recieving therapy there. She is doing well.    she denies fever, chills, and sweats since the time of the surgery.     Physical exam:  Post op dressing taken down.  Incision is clean, dry and intact.  Sutures removed without difficulty.      Assessment:  Post-op visit ( 2 weeks)    Plan:  - continue PT/OT  - weight bearing as tolerated,   -range of motion as tolerated   - return to clinic in 4 weeks at time x-ray of her femur is needed.

## 2017-04-13 DIAGNOSIS — E11.9 DIABETES MELLITUS WITHOUT COMPLICATION: ICD-10-CM

## 2017-04-18 ENCOUNTER — PATIENT OUTREACH (OUTPATIENT)
Dept: ADMINISTRATIVE | Facility: CLINIC | Age: 82
End: 2017-04-18
Payer: MEDICARE

## 2017-04-18 RX ORDER — DOCUSATE SODIUM 100 MG/1
100 CAPSULE, LIQUID FILLED ORAL DAILY
COMMUNITY

## 2017-04-18 RX ORDER — LANOLIN ALCOHOL/MO/W.PET/CERES
400 CREAM (GRAM) TOPICAL DAILY
COMMUNITY
End: 2017-06-30

## 2017-04-18 RX ORDER — ACETAMINOPHEN 500 MG
500 TABLET ORAL 3 TIMES DAILY PRN
COMMUNITY

## 2017-04-18 RX ORDER — POTASSIUM CHLORIDE 750 MG/1
10 TABLET, EXTENDED RELEASE ORAL DAILY
COMMUNITY
End: 2017-06-30

## 2017-04-18 RX ORDER — QUETIAPINE FUMARATE 25 MG/1
25 TABLET, FILM COATED ORAL DAILY
COMMUNITY
End: 2017-05-01 | Stop reason: SDUPTHER

## 2017-04-18 RX ORDER — NORTRIPTYLINE HYDROCHLORIDE 25 MG/1
25 CAPSULE ORAL DAILY
COMMUNITY
End: 2017-06-30

## 2017-04-19 ENCOUNTER — PATIENT OUTREACH (OUTPATIENT)
Dept: ADMINISTRATIVE | Facility: CLINIC | Age: 82
End: 2017-04-19
Payer: MEDICARE

## 2017-04-19 ENCOUNTER — TELEPHONE (OUTPATIENT)
Dept: INTERNAL MEDICINE | Facility: CLINIC | Age: 82
End: 2017-04-19

## 2017-04-19 NOTE — PATIENT INSTRUCTIONS
Discharge Instructions for Internal Fixation of a Fractured Femur  You had a procedure called internal fixation of a fractured femur (thighbone). During the procedure, a david, plates and screws, or several pins were inserted inside the bone to hold the broken ends of bone in place while they heal. Once the bone has healed, the david, plate and screws, or pins may need to be surgically removed. A broken femur is a serious injury that takes about 3 to 6 months to heal. Here are instructions to help you care for your leg when you are at home.  Activity  · Arrange your household to keep the items you need within reach.  · Remove electrical cords, throw rugs, and anything else that may cause you to fall.  · Use nonslip bath mats, grab bars, an elevated toilet seat, and a shower chair in your bathroom.  · Follow the weight-bearing instructions given by your healthcare provider. He or she will tell you how much weight you are--or are not--allowed to put on your leg.  · Use a cane, crutches, a walker, or handrails until your balance, flexibility, and strength improve. And remember to ask for help from others when you need it.  · Free up your hands so that you can use them to keep balance. Use a nicole pack, apron, or pockets to carry things.  · Dont sit or lie in the same position for long periods, or with your legs crossed. Carefully reposition yourself every 30 to 60 minutes.  · Dont drive until your healthcare provider says its OK. And never drive while taking opioid pain medicine.  Home care  · Take your pain medicine exactly as directed.  · Take special care when showering. Follow your healthcare providers instructions closely. Do the following, as instructed:  ¨ If you wear a leg brace or immobilizer, cover it with plastic to keep it dry while you shower.  ¨ If you do not wear a leg brace or immobilizer, carefully wash your incision with soap and water. Gently pat it dry. Dont rub the incision, or apply creams or  lotions to it. To avoid falling while showering, sit on a shower stool.   · Tell all of your healthcare providers--including your dentist--that you have a david, plate and screws, or pin in your leg. You may need to take antibiotics before dental work and other medical procedures to reduce the risk of infection.  Follow-up care  Follow up with your healthcare provider, or as advised.      When to call your healthcare provider  Call 911 right away if you have any of the following:  · Chest pain  · Shortness of breath  Otherwise, call your healthcare provider right away if you have any of these:  · Numbness or tingling in your leg or toes  · Cool, pale, red- or blue-colored leg or toes  · Fever above 100.4°F (38.0°C), or as advised  · Shaking chills  · Increased pain  · Swelling of the fracture site or calf  · Drainage with foul odor coming from the dressing  · A rash   Date Last Reviewed: 7/1/2016  © 3755-6832 [employer] [address] [place] [address], [place] [address]. All rights reserved. This information is not intended as a substitute for professional medical care. Always follow your healthcare professional's instructions.

## 2017-04-19 NOTE — TELEPHONE ENCOUNTER
----- Message from Gladis Hutchins sent at 4/19/2017  5:17 PM CDT -----  Contact: Jordyn Camacho-[facility]  Ms. Jordyn would like to be called back in regards to the patient's medication.  She has two discretion.      The multivitamin was not on the paperwork from [facility] and the nortriptyline (PAMELOR) 10 MG capsule should be 25mg. If you are ok with the medications she does not need a call back        Please call Jordyn Camacho at [phone].        Thanks!

## 2017-04-20 ENCOUNTER — PATIENT MESSAGE (OUTPATIENT)
Dept: INTERNAL MEDICINE | Facility: CLINIC | Age: 82
End: 2017-04-20

## 2017-04-20 NOTE — TELEPHONE ENCOUNTER
----- Message from Yuliet Adams sent at 4/19/2017  8:15 PM CDT -----  Contact: Richa Cardoso RN with [facility]  Richa is calling to inform Dr. Kaplan that pt had a fall this morning.  Pt fell from the bed to the floor. Pt tried to stand up and hit the floor.  Zeina did not go to the hospital not sure if she has any bumps, or bruises, but pt is stating that her arm is very sore.  Zeina's daughter stated that her mother was not down for long.    Please contact JACK Chaudhry at [phone]    Thank you

## 2017-04-21 ENCOUNTER — TELEPHONE (OUTPATIENT)
Dept: INTERNAL MEDICINE | Facility: CLINIC | Age: 82
End: 2017-04-21

## 2017-04-21 NOTE — TELEPHONE ENCOUNTER
----- Message from Clarissa Stratton sent at 4/21/2017  2:06 PM CDT -----  Contact: elvia with [facility]  [phone]  Needs to clarify that it is ok for patient to take mucinex dm and tylenol extra strength

## 2017-04-24 ENCOUNTER — PATIENT MESSAGE (OUTPATIENT)
Dept: INTERNAL MEDICINE | Facility: CLINIC | Age: 82
End: 2017-04-24

## 2017-04-25 ENCOUNTER — OFFICE VISIT (OUTPATIENT)
Dept: INTERNAL MEDICINE | Facility: CLINIC | Age: 82
End: 2017-04-25
Payer: MEDICARE

## 2017-04-25 VITALS — SYSTOLIC BLOOD PRESSURE: 130 MMHG | DIASTOLIC BLOOD PRESSURE: 84 MMHG | HEIGHT: 60 IN | HEART RATE: 64 BPM

## 2017-04-25 DIAGNOSIS — E11.49 TYPE II DIABETES MELLITUS WITH NEUROLOGICAL MANIFESTATIONS: ICD-10-CM

## 2017-04-25 DIAGNOSIS — S46.002D INJURY OF LEFT ROTATOR CUFF, SUBSEQUENT ENCOUNTER: ICD-10-CM

## 2017-04-25 DIAGNOSIS — S72.002A CLOSED LEFT HIP FRACTURE, INITIAL ENCOUNTER: Primary | ICD-10-CM

## 2017-04-25 PROCEDURE — 99499 UNLISTED E&M SERVICE: CPT | Mod: S$GLB,,, | Performed by: INTERNAL MEDICINE

## 2017-04-25 PROCEDURE — 99999 PR PBB SHADOW E&M-EST. PATIENT-LVL III: CPT | Mod: PBBFAC,,, | Performed by: INTERNAL MEDICINE

## 2017-04-25 PROCEDURE — 99213 OFFICE O/P EST LOW 20 MIN: CPT | Mod: S$GLB,,, | Performed by: INTERNAL MEDICINE

## 2017-04-25 RX ORDER — POTASSIUM CHLORIDE 750 MG/1
CAPSULE, EXTENDED RELEASE ORAL
COMMUNITY
Start: 2017-04-18 | End: 2017-04-25 | Stop reason: SDUPTHER

## 2017-04-26 ENCOUNTER — TELEPHONE (OUTPATIENT)
Dept: ORTHOPEDICS | Facility: CLINIC | Age: 82
End: 2017-04-26

## 2017-04-26 NOTE — TELEPHONE ENCOUNTER
----- Message from Chet Blackwell sent at 4/25/2017  8:28 AM CDT -----  Contact: [email or web address], [phone]  Leonora called to advise that there is a possible subluxation of the left shoulder. Pt has same moderate pain complaint with movement of left arm. Pt is scheduled to see PCP this morning    Call back is [phone]    Thank you

## 2017-04-30 ENCOUNTER — PATIENT MESSAGE (OUTPATIENT)
Dept: INTERNAL MEDICINE | Facility: CLINIC | Age: 82
End: 2017-04-30

## 2017-05-01 ENCOUNTER — PATIENT MESSAGE (OUTPATIENT)
Dept: INTERNAL MEDICINE | Facility: CLINIC | Age: 82
End: 2017-05-01

## 2017-05-01 NOTE — PROGRESS NOTES
Subjective:       Patient ID: Zenia White is a 89 y.o. female.    Chief Complaint: Hospital Follow Up (hip fx) and Shoulder Pain (L)    Shoulder Pain    The pain is present in the left shoulder and right shoulder. This is a chronic problem. The current episode started more than 1 year ago. The problem has been unchanged. The quality of the pain is described as aching. The pain is moderate.   Hip Injury   This is a new problem. The current episode started 1 to 4 weeks ago. The problem has been rapidly improving. Pertinent negatives include no joint swelling or weakness. Treatments tried: surgical repair. The treatment provided significant relief.   Transitional Care Note    Family and/or Caretaker present at visit?  Yes.  Diagnostic tests reviewed/disposition: No diagnosic tests pending after this hospitalization.  Disease/illness education: hip fracture  Home health/community services discussion/referrals: Patient has home health established at local agency.   Establishment or re-establishment of referral orders for community resources: No other necessary community resources.   Discussion with other health care providers: No discussion with other health care providers necessary.           Review of Systems   Musculoskeletal: Negative for joint swelling.   Neurological: Negative for weakness.       Objective:      Physical Exam   Constitutional: She is oriented to person, place, and time. She appears well-developed and well-nourished. No distress.   HENT:   Head: Normocephalic and atraumatic.   Mouth/Throat: Oropharynx is clear and moist.   Eyes: Conjunctivae are normal. Pupils are equal, round, and reactive to light.   Neck: Normal range of motion. Neck supple.   Cardiovascular: Normal rate, regular rhythm and normal heart sounds.    Pulmonary/Chest: Effort normal and breath sounds normal. She has no wheezes.   Abdominal: Soft. Bowel sounds are normal. There is no tenderness.   Musculoskeletal: Normal range of  motion. She exhibits no edema.        Right shoulder: She exhibits tenderness. She exhibits normal strength.        Left shoulder: She exhibits tenderness. She exhibits normal strength.   Neurological: She is alert and oriented to person, place, and time. No cranial nerve deficit.   Skin: No erythema.   Psychiatric: She has a normal mood and affect.   Vitals reviewed.      Assessment:       1. Closed left hip fracture, initial encounter    2. Injury of left rotator cuff, subsequent encounter    3. Type II diabetes mellitus with neurological manifestations        Plan:       Zeina was seen today for hospital follow up and shoulder pain.    Diagnoses and all orders for this visit:    Closed left hip fracture, initial encounter  Comments:  healing well, no pain    Injury of left rotator cuff, subsequent encounter    Type II diabetes mellitus with neurological manifestations        Return in about 4 months (around 8/25/2017) for F/U APPOINTMENT WITH ME.

## 2017-05-02 RX ORDER — QUETIAPINE FUMARATE 25 MG/1
25 TABLET, FILM COATED ORAL DAILY
Qty: 90 TABLET | Refills: 3 | Status: SHIPPED | OUTPATIENT
Start: 2017-05-02 | End: 2017-06-28 | Stop reason: SDUPTHER

## 2017-05-05 ENCOUNTER — TELEPHONE (OUTPATIENT)
Dept: ADMINISTRATIVE | Facility: CLINIC | Age: 82
End: 2017-05-05

## 2017-05-09 ENCOUNTER — OFFICE VISIT (OUTPATIENT)
Dept: ORTHOPEDICS | Facility: CLINIC | Age: 82
End: 2017-05-09
Payer: MEDICARE

## 2017-05-09 ENCOUNTER — HOSPITAL ENCOUNTER (OUTPATIENT)
Dept: RADIOLOGY | Facility: HOSPITAL | Age: 82
Discharge: HOME | End: 2017-05-09
Attending: ORTHOPAEDIC SURGERY
Payer: MEDICARE

## 2017-05-09 DIAGNOSIS — S72.042A: ICD-10-CM

## 2017-05-09 DIAGNOSIS — Z98.890 STATUS POST SURGERY: Primary | ICD-10-CM

## 2017-05-09 DIAGNOSIS — Z98.890 STATUS POST SURGERY: ICD-10-CM

## 2017-05-09 PROCEDURE — 73552 X-RAY EXAM OF FEMUR 2/>: CPT | Mod: TC,LT

## 2017-05-09 PROCEDURE — 99024 POSTOP FOLLOW-UP VISIT: CPT | Mod: S$GLB,,, | Performed by: PHYSICIAN ASSISTANT

## 2017-05-09 PROCEDURE — 99999 PR PBB SHADOW E&M-EST. PATIENT-LVL II: CPT | Mod: PBBFAC,,, | Performed by: PHYSICIAN ASSISTANT

## 2017-05-09 PROCEDURE — 73552 X-RAY EXAM OF FEMUR 2/>: CPT | Mod: 26,LT,, | Performed by: RADIOLOGY

## 2017-05-12 NOTE — PROGRESS NOTES
Ms. White is an 89-year-old female who fell from a standing height resulting in a displaced left basicervical femoral neck  Fracture. Treated with CM nail 03/21/2107 by .     She is  here today for a post-operative visit after a  Cephalomedullary nail fixation of left basicervical femoral neck fracture  by Dr. Cornejo  on 03/21/2017.      she reports that she is doing well.  Pain is controlled.  she is  taking pain medication, tylenol PRN.   She is recieving therapy there. She is doing well.    she denies fever, chills, and sweats since the time of the surgery.     Physical exam:  Walked into clinic with walker accompanied by her son.  Incision is clean, dry and intact well healed, FROM of knee and ankle. No TTP.       RADS: hardware in good position, no failure noted.     Assessment:  Post-op visit ( 6 weeks)    Plan:  - continue PT/OT  - weight bearing as tolerated,   -range of motion as tolerated   - return to clinic in 6 weeks at time x-ray of her femur is needed.

## 2017-05-16 DIAGNOSIS — Z98.890 STATUS POST SURGERY: ICD-10-CM

## 2017-05-16 DIAGNOSIS — S72.042A: Primary | ICD-10-CM

## 2017-05-23 ENCOUNTER — PATIENT MESSAGE (OUTPATIENT)
Dept: INTERNAL MEDICINE | Facility: CLINIC | Age: 82
End: 2017-05-23

## 2017-05-23 ENCOUNTER — TELEPHONE (OUTPATIENT)
Dept: INTERNAL MEDICINE | Facility: CLINIC | Age: 82
End: 2017-05-23

## 2017-05-23 DIAGNOSIS — R39.11 HESITANCY: Primary | ICD-10-CM

## 2017-05-23 RX ORDER — CIPROFLOXACIN 250 MG/1
250 TABLET, FILM COATED ORAL 2 TIMES DAILY
Qty: 10 TABLET | Refills: 0 | Status: SHIPPED | OUTPATIENT
Start: 2017-05-23 | End: 2017-05-28

## 2017-05-23 NOTE — TELEPHONE ENCOUNTER
She did one today but dropped the tissue in the urine. Nurse will drop another off tomorrow but she did say urine was very dark in color.after collection nurse will bring to lab for processing.

## 2017-05-24 ENCOUNTER — OFFICE VISIT (OUTPATIENT)
Dept: PODIATRY | Facility: CLINIC | Age: 82
End: 2017-05-24
Payer: MEDICARE

## 2017-05-24 ENCOUNTER — LAB VISIT (OUTPATIENT)
Dept: LAB | Facility: HOSPITAL | Age: 82
End: 2017-05-24
Attending: INTERNAL MEDICINE
Payer: MEDICARE

## 2017-05-24 VITALS — HEART RATE: 91 BPM | SYSTOLIC BLOOD PRESSURE: 130 MMHG | HEIGHT: 60 IN | DIASTOLIC BLOOD PRESSURE: 81 MMHG

## 2017-05-24 DIAGNOSIS — E11.49 TYPE II DIABETES MELLITUS WITH NEUROLOGICAL MANIFESTATIONS: Primary | ICD-10-CM

## 2017-05-24 DIAGNOSIS — L84 CORN OR CALLUS: ICD-10-CM

## 2017-05-24 DIAGNOSIS — N39.0 URINARY TRACT INFECTION, SITE NOT SPECIFIED: Primary | ICD-10-CM

## 2017-05-24 DIAGNOSIS — B35.1 ONYCHOMYCOSIS DUE TO DERMATOPHYTE: ICD-10-CM

## 2017-05-24 LAB
BILIRUB UR QL STRIP: NEGATIVE
CLARITY UR REFRACT.AUTO: CLEAR
COLOR UR AUTO: YELLOW
GLUCOSE UR QL STRIP: NEGATIVE
HGB UR QL STRIP: NEGATIVE
KETONES UR QL STRIP: NEGATIVE
LEUKOCYTE ESTERASE UR QL STRIP: ABNORMAL
MICROSCOPIC COMMENT: ABNORMAL
NITRITE UR QL STRIP: NEGATIVE
NON-SQ EPI CELLS #/AREA URNS AUTO: 1 /HPF
PH UR STRIP: 8 [PH] (ref 5–8)
PROT UR QL STRIP: NEGATIVE
RBC #/AREA URNS AUTO: 0 /HPF (ref 0–4)
SP GR UR STRIP: 1.01 (ref 1–1.03)
SQUAMOUS #/AREA URNS AUTO: 0 /HPF
URN SPEC COLLECT METH UR: ABNORMAL
UROBILINOGEN UR STRIP-ACNC: NEGATIVE EU/DL
WBC #/AREA URNS AUTO: 31 /HPF (ref 0–5)

## 2017-05-24 PROCEDURE — 81001 URINALYSIS AUTO W/SCOPE: CPT

## 2017-05-24 PROCEDURE — 11056 PARNG/CUTG B9 HYPRKR LES 2-4: CPT | Mod: Q9,S$GLB,, | Performed by: PODIATRIST

## 2017-05-24 PROCEDURE — 11721 DEBRIDE NAIL 6 OR MORE: CPT | Mod: 59,Q9,S$GLB, | Performed by: PODIATRIST

## 2017-05-24 PROCEDURE — 99499 UNLISTED E&M SERVICE: CPT | Mod: S$GLB,,, | Performed by: PODIATRIST

## 2017-05-24 PROCEDURE — 1159F MED LIST DOCD IN RCRD: CPT | Mod: S$GLB,,, | Performed by: PODIATRIST

## 2017-05-24 PROCEDURE — 87086 URINE CULTURE/COLONY COUNT: CPT

## 2017-05-24 PROCEDURE — 99999 PR PBB SHADOW E&M-EST. PATIENT-LVL III: CPT | Mod: PBBFAC,,, | Performed by: PODIATRIST

## 2017-05-24 PROCEDURE — 1126F AMNT PAIN NOTED NONE PRSNT: CPT | Mod: S$GLB,,, | Performed by: PODIATRIST

## 2017-05-24 PROCEDURE — 1160F RVW MEDS BY RX/DR IN RCRD: CPT | Mod: S$GLB,,, | Performed by: PODIATRIST

## 2017-05-24 NOTE — PROGRESS NOTES
Subjective:      Patient ID: Zeina White is a 89 y.o. female.    Chief Complaint: Diabetes Mellitus (PCP Dr. Kaplan 4/25/17); Diabetic Foot Exam; and Routine Foot Care     has a past medical history of Anxiety; Atypical chest pain; Dementia; Diabetes mellitus; DJD (degenerative joint disease); GERD (gastroesophageal reflux disease); Hyperlipidemia; Hypertension; Irritable bowel; Tremor, essential; and Vertigo.    Presents for DM foot exam. Daughter present for visit. Also reports some numbness and tingling to both feet.      Review of Systems   Cardiovascular: Negative for chest pain and claudication.   Respiratory: Negative for cough.    Skin: Positive for color change, dry skin and nail changes.   Musculoskeletal: Positive for joint pain.   Gastrointestinal: Negative for nausea.   Neurological: Positive for numbness and paresthesias.           Objective:      Physical Exam   Constitutional: She appears well-developed.   Psychiatric: She has a normal mood and affect.     Cardiovascular:   Dorsalis pedis and posterior tibial pulses are diminished Bilaterally. Toes are cool to touch. Feet are warm proximally.There is decreased digital hair. Skin is atrophic, slightly hyperpigmented, and mildly edematous      Pulmonary/Chest: No respiratory distress.     Musculoskeletal: She exhibits tenderness at toes 1 and 2 on right foot. She exhibits no edema.   Adequate joint range of motion without pain, limitation, nor crepitation Bilateral feet and ankle joints. Muscle strength is 5/5 in all groups bilaterally.       Neurological: She is alert.   [place]-Iv[place] 5.07 monofilamant testing is diminished Jag feet. Sharp/dull sensation diminished Bilaterally. Light touch absent Bilaterally.      Skin: Skin is dry. No erythema.   Nails x10 are elongated by 2-6mm's, thickened by 3-5 mm's, dystrophic, and are darkened in coloration . Xerosis Bilaterally. No open lesions noted   Callus noted at b/l third toe distally    Feet:  "  Right Foot:   Skin Integrity: Positive for callus and dry skin. Negative for ulcer or skin breakdown.   Left Foot:   Skin Integrity: Positive for dry skin. Negative for ulcer.              Assessment:       Encounter Diagnoses   Name Primary?    Type II diabetes mellitus with neurological manifestations Yes    Onychomycosis due to dermatophyte     Corn or callus        Narrative   3 views: There is a severe hallux valgus deformity with DJD.  There are spurs on the calcaneus.  No fracture dislocation bone destruction seen.      Electronically signed by: RAMIREZ GOLD  Date: 10/26/16  Time: 11:23            Plan:       Zeina was seen today for diabetes mellitus, diabetic foot exam and routine foot care.    Diagnoses and all orders for this visit:    Type II diabetes mellitus with neurological manifestations  -     Foot Care    Onychomycosis due to dermatophyte    Corn or callus    Routine Foot Care  Date/Time: 5/24/2017 3:00 PM  Performed by: JD SNEED JR.  Authorized by: JD SNEED JR.     Time out: Immediately prior to procedure a "time out" was called to verify the correct patient, procedure, equipment, support staff and site/side marked as required.    Consent Done?:  Yes (Verbal)  Hyperkeratotic Skin Lesions?: Yes    Number of trimmed lesions:  2  Location(s):  Left 3rd Toe and Right 3rd Toe    Nail Care Type:  Debride  Location(s): All  (Left 1st Toe, Left 3rd Toe, Left 2nd Toe, Left 4th Toe, Left 5th Toe, Right 1st Toe, Right 2nd Toe, Right 3rd Toe, Right 4th Toe and Right 5th Toe)  Patient tolerance:  Patient tolerated the procedure well with no immediate complications        ADA Risk Classification: LOPS with or without deformity - 1: rtc 3-6 months       I counseled the patient on her conditions, their implications and medical management.    - Shoe inspection. Diabetic Foot Education. Patient reminded of the importance of good nutrition and blood sugar control to help prevent podiatric " complications of diabetes. Patient instructed on proper foot hygeine. We discussed wearing proper shoe gear, daily foot inspections, never walking without protective shoe gear, never putting sharp instruments to feet    -dispensed offloading pads for toes 1 and 2      rtc 3-6 months

## 2017-05-24 NOTE — PATIENT INSTRUCTIONS
Diabetes: Inspecting Your Feet    Diabetes increases your chances of developing foot problems. So inspect your feet every day. This helps you find small skin irritations before they become serious ulcers or infections. If you have trouble seeing the bottoms of your feet, use a mirror or ask a family member or friend to help.  How to check your feet  Below are tips to help you look for foot problems. Try to check your feet at the same time each day, such as when you get out of bed in the morning:  · Check the top of each foot. The tops of toes, back of the heel, and outer edge of the foot can get a lot of rubbing from poor-fitting shoes.  · Check the bottom of each foot. Daily wear and tear often leads to problems at pressure spots.  · Check the toes and nails. Fungal infections often occur between toes. Toenail problems can also be a sign of fungal infections or lead to breaks in the skin.  · Check your shoes, too. Loose objects inside a shoe can injure the foot. Use your hand to feel inside your shoes for things like lelo, loose stitching, or rough areas that could irritate your skin.  Warning signs  Look for any color changes in the foot. Redness with streaks can signal a severe infection, which needs immediate medical attention. Tell your healthcare provider right away if you have any of these problems:  · Swelling, sometimes with color changes, may be a sign of poor blood flow or infection. Symptoms include tenderness and an increase in the size of your foot.  · Warm or hot areas on your feet may be signs of infection. A foot that is cold may not be getting enough blood.  · Sensations such as burning, tingling, or pins and needles can be signs of a problem. Also check for areas that may be numb.  · Hot spots are caused by friction or pressure. Look for hot spots in areas that get a lot of rubbing. Hot spots can turn into blisters, calluses, or sores.  · Cracks and sores are caused by dry or irritated  skin. They are a sign that the skin is breaking down, which can lead to infection.  · Toenail problems to watch for include nails growing into the skin (ingrown toenail) and causing redness or pain. Thick, yellow, or discolored nails can signal a fungal infection.  · Drainage and odor can develop from untreated sores and ulcers. Call your healthcare provider right away if you notice white or yellow drainage, bleeding, or unpleasant odor.   Date Last Reviewed: 6/1/2016 © 2000-2016 [employer]. [address] [place] [id number]. All rights reserved. This information is not intended as a substitute for professional medical care. Always follow your healthcare professional's instructions.      Your Diabetes Foot Care Program    Every day you depend on your feet to keep you moving. But when you have diabetes, your feet need special care. Even a small foot problem can become very serious. So dont take your feet for granted. By working with your diabetes healthcare team, you can learn how to protect your feet and keep them healthy.  Evaluating your feet  An evaluation helps your healthcare provider check the condition of your feet. The evaluation includes a review of your diabetes history and overall health. It may also include a foot exam, X-rays, or other tests. These can help show problems beneath the skin that you cant see or feel.  Medical history  You will be asked about your overall health and any history of foot problems. Youll also discuss your diabetes history, such as whether your blood sugar level has changed over time. It also includes questions about sensations of pain, tingling, pins and needles, or numbness. Your healthcare provider will also want to know if you have high blood pressure and heart disease, or if you smoke. Be sure to mention any medicines (including over-the-counter), supplements, or herbal remedies you take.  Foot exam  A foot exam checks the condition of different  parts of your foot. First, your skin and nails are examined for any signs of infection. Blood flow is checked by feeling for the pulses in each foot. You may also have tests to study the nerves in the foot. These include using a small filament (wire) to see how sensitive your feet are. In certain cases, you will be asked to walk a short distance to check for bone, joint, and muscle problems.  Diagnostic tests  If needed, your healthcare provider will suggest certain tests to learn more about your feet. These include:  · Doppler tests to measure blood flow in the feet and lower leg.  · X-rays, which can show bone or joint problems.  · Other imaging tests, such as an MRI (magnetic resonance imaging), bone scan, and CT (computed tomography) scan. These can help show bone infections.  · Other tests, such as vascular tests, which study the blood flow in your feet and legs. You may also have nerve studies to learn how sensitive your feet are.  Creating a foot care program  Based on the evaluation, your healthcare provider will create a foot care program for you. Your program may be as simple as starting a daily self-care routine and changing the types of shoes your wear. It may also involve treating minor foot problems, such as a corn or blister. In some cases, surgery will be needed to treat an infection or mechanical problems, such as hammer toes.  Preventing problems  When you have diabetes, its easier to prevent problems than to treat them later on. So see your healthcare team for regular checkups and foot care. Your healthcare team can also help you learn more about caring for your feet at home. For example, you may be told to avoid walking barefoot. Or you may be told that special footwear is needed to protect your feet.  Have regular checkups  Foot problems can develop quickly. So be sure to follow your healthcare teams schedule for regular checkups. During office visits, take off your shoes and socks as soon as  you get in the exam room. Ask your healthcare provider to examine your feet for problems. This will make it easier to find and treat small skin irritations before they get worse. Regular checkups can also help keep track of the blood flow and feeling in your feet. If you have neuropathy (lack of feeling in your feet), you will need to have checkups more often.  Learn about self-care  The more you know about diabetes and your feet, the easier it will be to prevent problems. Members of your healthcare team can teach you how to inspect your feet and teach you to look for warning signs. They can also give you other foot care tips. During office visits, be sure to ask any questions you have.  Date Last Reviewed: 7/1/2016 © 2000-2016 [employer]. [address] [place] [address], [place] [address]. All rights reserved. This information is not intended as a substitute for professional medical care. Always follow your healthcare professional's instructions.        Long-Term Complications of Diabetes    Diabetes can cause health problems over time. These are called complications. They are more likely to happen if your blood sugar is often too high. Over time, high blood sugar can damage blood vessels in your body. It is important to keep your blood sugar in your target range. This can help prevent or delay complications from diabetes.  Possible complications  Complications of diabetes include:  · Eye problems, including damage to the blood vessels in the eyes (retinopathy), pressure in the eye (glaucoma), and clouding of the eyes lens (a cataract). Eye problems can eventually lead to irreversible blindness.   · Tooth and gum problems (periodontal disease), causing loss of teeth and bone  · Blood vessel (vascular) disease leading to circulation problems, heart attack or stroke, or a need for amputation of a limb   · Problems with sexual function leading to erectile dysfunction in men and sexual discomfort in  women   · Kidney disease (nephropathy) can eventually lead to kidney failure, which may require dialysis or kidney transplant   · Nerve problems (neuropathy), causing pain or loss of feeling in your feet and other parts of your body, potentially leading to an amputation of a limb   · High blood pressure (hypertension), putting strain on your heart and blood vessels  · Serious infections, possibly leading to loss of toes, feet, or limbs  How to avoid complications  The serious consequences of these complications may be avoidable for most people with diabetes by managing your blood glucose, blood pressure, and cholesterol levels. This can help you feel better and stay healthy. You can manage diabetes by tracking your blood sugar. You can also eat healthy and exercise to avoid gaining weight. And you should take medicine if directed by your healthcare provider.  Date Last Reviewed: 5/1/2016 © 2000-2016 [employer]. [address] [place] [address], [place] [address]. All rights reserved. This information is not intended as a substitute for professional medical care. Always follow your healthcare professional's instructions.

## 2017-05-25 ENCOUNTER — TELEPHONE (OUTPATIENT)
Dept: INTERNAL MEDICINE | Facility: CLINIC | Age: 82
End: 2017-05-25

## 2017-05-25 ENCOUNTER — PATIENT MESSAGE (OUTPATIENT)
Dept: INTERNAL MEDICINE | Facility: CLINIC | Age: 82
End: 2017-05-25

## 2017-05-25 NOTE — TELEPHONE ENCOUNTER
----- Message from Asmita Carreon sent at 5/25/2017 12:28 PM CDT -----  Contact: Destiny/Mother/ [phone]   Type: Test Results    What test was performed?  SPECIMEN     Who ordered the test? Dr. Kaplan     When and where were the test performed?  05/24/2017     Comments: pt daughter is calling to receive the test results. Please call and advise       Thank you

## 2017-05-26 LAB — BACTERIA UR CULT: NORMAL

## 2017-05-31 ENCOUNTER — PATIENT MESSAGE (OUTPATIENT)
Dept: INTERNAL MEDICINE | Facility: CLINIC | Age: 82
End: 2017-05-31

## 2017-05-31 ENCOUNTER — TELEPHONE (OUTPATIENT)
Dept: INTERNAL MEDICINE | Facility: CLINIC | Age: 82
End: 2017-05-31

## 2017-05-31 ENCOUNTER — TELEPHONE (OUTPATIENT)
Dept: ORTHOPEDICS | Facility: CLINIC | Age: 82
End: 2017-05-31
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
## 2017-05-31 NOTE — TELEPHONE ENCOUNTER
----- Message from Leslie Alvarado sent at 5/31/2017 10:21 AM CDT -----  Contact: daughter-Destiny Courtney's daughter would like a call back regarding the appt scheduled for fracture care clinic. She's not sure what this appt is for and needs clarification. She also is requesting to possibly reschedule these appts to a different day. Please call her at [phone] and leave a detailed message if she does not answer

## 2017-05-31 NOTE — TELEPHONE ENCOUNTER
Returned call to Destiny. Informed her of the purpose of appointment. She verbalized understanding. She will call back to reschedule both appointments at a later date.

## 2017-06-01 ENCOUNTER — PATIENT MESSAGE (OUTPATIENT)
Dept: INTERNAL MEDICINE | Facility: CLINIC | Age: 82
End: 2017-06-01

## 2017-06-02 ENCOUNTER — TELEPHONE (OUTPATIENT)
Dept: ORTHOPEDICS | Facility: CLINIC | Age: 82
End: 2017-06-02

## 2017-06-02 NOTE — TELEPHONE ENCOUNTER
Returned call to Destiny. Informed her of the purpose of the requested information and confirmed her mother, the patient's, appointment for 6/20/17.

## 2017-06-02 NOTE — TELEPHONE ENCOUNTER
----- Message from Leslie Alvarado sent at 6/2/2017  9:45 AM CDT -----  Contact: Destiny- daughter  Pt's daughter is requesting a call back for clarification on the paperwork that was sent to the patient for her appt. She wanted to know if it was absolutely necessary for pt to have proof of ins, income, etc for the appt. She also wanted to confirm the appt on 6/20. [phone]

## 2017-06-05 DIAGNOSIS — R30.0 DYSURIA: Primary | ICD-10-CM

## 2017-06-05 NOTE — TELEPHONE ENCOUNTER
----- Message from Gopi Jaeger sent at 6/5/2017  8:42 AM CDT -----  Contact: Atul/[facility] [phone]  Type: Orders Request    What orders/ testing are being requested? [facility]    Is there a future appointment scheduled for the patient with PCP? NO    When?    Comments: Patient complaining of burning and daughter is requesting a test. Please call Atul and advise.    Thanks      Burning with odor

## 2017-06-08 ENCOUNTER — PATIENT MESSAGE (OUTPATIENT)
Dept: INTERNAL MEDICINE | Facility: CLINIC | Age: 82
End: 2017-06-08

## 2017-06-08 DIAGNOSIS — E11.9 DIABETES MELLITUS WITHOUT COMPLICATION: Primary | ICD-10-CM

## 2017-06-08 RX ORDER — LANCETS
1 EACH MISCELLANEOUS 2 TIMES DAILY
Qty: 100 EACH | Refills: 0 | Status: SHIPPED | OUTPATIENT
Start: 2017-06-08 | End: 2017-06-30

## 2017-06-08 RX ORDER — INSULIN PUMP SYRINGE, 3 ML
EACH MISCELLANEOUS
Qty: 1 EACH | Refills: 0 | Status: SHIPPED | OUTPATIENT
Start: 2017-06-08 | End: 2017-06-30

## 2017-06-14 ENCOUNTER — PATIENT MESSAGE (OUTPATIENT)
Dept: INTERNAL MEDICINE | Facility: CLINIC | Age: 82
End: 2017-06-14

## 2017-06-14 RX ORDER — NORTRIPTYLINE HYDROCHLORIDE 10 MG/1
CAPSULE ORAL
Refills: 11 | COMMUNITY
Start: 2017-06-03 | End: 2018-08-13

## 2017-06-14 RX ORDER — METFORMIN HYDROCHLORIDE 500 MG/1
500 TABLET ORAL 2 TIMES DAILY WITH MEALS
Qty: 180 TABLET | Refills: 2 | Status: CANCELLED | OUTPATIENT
Start: 2017-06-14

## 2017-06-14 RX ORDER — METFORMIN HYDROCHLORIDE 500 MG/1
500 TABLET ORAL 2 TIMES DAILY WITH MEALS
Qty: 180 TABLET | Refills: 0 | Status: CANCELLED | OUTPATIENT
Start: 2017-06-14

## 2017-06-14 RX ORDER — METFORMIN HYDROCHLORIDE 500 MG/1
TABLET ORAL
Qty: 180 TABLET | Refills: 12 | Status: SHIPPED | OUTPATIENT
Start: 2017-06-14 | End: 2017-06-15 | Stop reason: SDUPTHER

## 2017-06-15 RX ORDER — METFORMIN HYDROCHLORIDE 500 MG/1
TABLET ORAL
Qty: 180 TABLET | Refills: 0 | Status: SHIPPED | OUTPATIENT
Start: 2017-06-15 | End: 2017-12-01 | Stop reason: SDUPTHER

## 2017-06-20 ENCOUNTER — OFFICE VISIT (OUTPATIENT)
Dept: ORTHOPEDICS | Facility: CLINIC | Age: 82
End: 2017-06-20
Payer: MEDICARE

## 2017-06-20 ENCOUNTER — HOSPITAL ENCOUNTER (OUTPATIENT)
Dept: RADIOLOGY | Facility: HOSPITAL | Age: 82
Discharge: HOME | End: 2017-06-20
Attending: ORTHOPAEDIC SURGERY
Payer: MEDICARE

## 2017-06-20 VITALS — WEIGHT: 140 LBS | BODY MASS INDEX: 27.48 KG/M2 | HEIGHT: 60 IN

## 2017-06-20 DIAGNOSIS — M89.8X5 PAIN OF LEFT FEMUR: Primary | ICD-10-CM

## 2017-06-20 DIAGNOSIS — M89.8X5 PAIN OF LEFT FEMUR: ICD-10-CM

## 2017-06-20 DIAGNOSIS — Z09 S/P ORTHOPEDIC SURGERY, FOLLOW-UP EXAM: ICD-10-CM

## 2017-06-20 DIAGNOSIS — S72.042A: Primary | ICD-10-CM

## 2017-06-20 PROCEDURE — 73552 X-RAY EXAM OF FEMUR 2/>: CPT | Mod: 26,LT,, | Performed by: RADIOLOGY

## 2017-06-20 PROCEDURE — 99999 PR PBB SHADOW E&M-EST. PATIENT-LVL III: CPT | Mod: PBBFAC,,, | Performed by: PHYSICIAN ASSISTANT

## 2017-06-20 PROCEDURE — 73552 X-RAY EXAM OF FEMUR 2/>: CPT | Mod: TC,LT

## 2017-06-20 PROCEDURE — 99024 POSTOP FOLLOW-UP VISIT: CPT | Mod: S$GLB,,, | Performed by: PHYSICIAN ASSISTANT

## 2017-06-22 RX ORDER — SIMVASTATIN 40 MG/1
TABLET, FILM COATED ORAL
Qty: 90 TABLET | Refills: 3 | Status: SHIPPED | OUTPATIENT
Start: 2017-06-22 | End: 2017-06-30 | Stop reason: SDUPTHER

## 2017-06-23 NOTE — PROGRESS NOTES
Ms. White is an 89-year-old female who fell from a standing height resulting in a displaced left basicervical femoral neck  Fracture. Treated with CM nail 03/21/2107 by .     She is  here today for a post-operative visit after a  Cephalomedullary nail fixation of left basicervical femoral neck fracture  by Dr. Cornejo  on 03/21/2017.      she reports that she is doing well.  Pain is controlled.  she is  taking pain medication, tylenol PRN.   She is recieving therapy. She is doing well.    she denies fever, chills, and sweats since the time of the surgery.     Physical exam:  Walked into clinic with walker with her daughter.  IFROM of knee and ankle. Mild lateral TTP. NVI       RADS: hardware in good position, no failure noted.     Assessment:  Post-op visit ( 12 weeks)    Plan:  - continue PT/OT  - weight bearing as tolerated,   -range of motion as tolerated   - return to clinic in 6 weeks if any increase in pain at time x-ray of her femur is needed.

## 2017-06-28 ENCOUNTER — PATIENT MESSAGE (OUTPATIENT)
Dept: INTERNAL MEDICINE | Facility: CLINIC | Age: 82
End: 2017-06-28

## 2017-06-28 RX ORDER — QUETIAPINE FUMARATE 25 MG/1
50 TABLET, FILM COATED ORAL DAILY
Qty: 60 TABLET | Refills: 11 | Status: SHIPPED | OUTPATIENT
Start: 2017-06-28 | End: 2017-06-30 | Stop reason: SDUPTHER

## 2017-06-30 ENCOUNTER — OFFICE VISIT (OUTPATIENT)
Dept: INTERNAL MEDICINE | Facility: CLINIC | Age: 82
End: 2017-06-30
Payer: MEDICARE

## 2017-06-30 ENCOUNTER — PATIENT MESSAGE (OUTPATIENT)
Dept: INTERNAL MEDICINE | Facility: CLINIC | Age: 82
End: 2017-06-30

## 2017-06-30 VITALS
HEIGHT: 60 IN | HEART RATE: 90 BPM | SYSTOLIC BLOOD PRESSURE: 170 MMHG | BODY MASS INDEX: 23.24 KG/M2 | WEIGHT: 118.38 LBS | DIASTOLIC BLOOD PRESSURE: 90 MMHG

## 2017-06-30 DIAGNOSIS — F33.1 MODERATE EPISODE OF RECURRENT MAJOR DEPRESSIVE DISORDER: ICD-10-CM

## 2017-06-30 DIAGNOSIS — F03.91 DEMENTIA WITH BEHAVIORAL DISTURBANCE, UNSPECIFIED DEMENTIA TYPE: Primary | ICD-10-CM

## 2017-06-30 DIAGNOSIS — I10 ESSENTIAL HYPERTENSION: ICD-10-CM

## 2017-06-30 PROCEDURE — 99999 PR PBB SHADOW E&M-EST. PATIENT-LVL III: CPT | Mod: PBBFAC,,, | Performed by: INTERNAL MEDICINE

## 2017-06-30 PROCEDURE — 99214 OFFICE O/P EST MOD 30 MIN: CPT | Mod: S$GLB,,, | Performed by: INTERNAL MEDICINE

## 2017-06-30 PROCEDURE — 1159F MED LIST DOCD IN RCRD: CPT | Mod: S$GLB,,, | Performed by: INTERNAL MEDICINE

## 2017-06-30 PROCEDURE — 1126F AMNT PAIN NOTED NONE PRSNT: CPT | Mod: S$GLB,,, | Performed by: INTERNAL MEDICINE

## 2017-06-30 PROCEDURE — 99499 UNLISTED E&M SERVICE: CPT | Mod: S$GLB,,, | Performed by: INTERNAL MEDICINE

## 2017-06-30 RX ORDER — BLOOD-GLUCOSE METER
EACH MISCELLANEOUS
COMMUNITY
Start: 2017-06-08 | End: 2019-08-14 | Stop reason: SDUPTHER

## 2017-06-30 RX ORDER — GLUCOSAM/CHON-MSM1/C/MANG/BOSW 500-416.6
TABLET ORAL
COMMUNITY
Start: 2017-06-08 | End: 2018-08-13 | Stop reason: SDUPTHER

## 2017-06-30 RX ORDER — QUETIAPINE FUMARATE 25 MG/1
TABLET, FILM COATED ORAL
Qty: 90 TABLET | Refills: 3 | Status: SHIPPED | OUTPATIENT
Start: 2017-06-30 | End: 2017-09-14 | Stop reason: SDUPTHER
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
## 2017-07-03 ENCOUNTER — PATIENT MESSAGE (OUTPATIENT)
Dept: INTERNAL MEDICINE | Facility: CLINIC | Age: 82
End: 2017-07-03

## 2017-07-07 ENCOUNTER — PATIENT MESSAGE (OUTPATIENT)
Dept: PSYCHIATRY | Facility: CLINIC | Age: 82
End: 2017-07-07

## 2017-07-10 ENCOUNTER — PATIENT MESSAGE (OUTPATIENT)
Dept: PSYCHIATRY | Facility: CLINIC | Age: 82
End: 2017-07-10

## 2017-07-11 ENCOUNTER — TELEPHONE (OUTPATIENT)
Dept: INTERNAL MEDICINE | Facility: CLINIC | Age: 82
End: 2017-07-11

## 2017-07-11 DIAGNOSIS — Q99.2 FRAGILE X SYNDROME: Primary | ICD-10-CM

## 2017-07-11 NOTE — TELEPHONE ENCOUNTER
Spoke with Kasandra and she stated that her sister will call back and schedule the appt that 's available with her schedule

## 2017-07-11 NOTE — TELEPHONE ENCOUNTER
Please call daughter (Kasandra Escobar) and schedule Genetics referral appointment (order is in).

## 2017-07-11 NOTE — TELEPHONE ENCOUNTER
----- Message from Siva Castaneda MA sent at 7/11/2017  1:17 PM CDT -----  Contact: daughter / Destiny [phone]   Patient c/o vaginal pain. Daughter stated she's not sure if the patient is having burning or something else due to her having dementia. Requesting to speak with Dr Gupta regarding an appt. Please call. Thanks!

## 2017-07-11 NOTE — TELEPHONE ENCOUNTER
Spoke with pt's daughter and she stated that the pt has been a burning sensation for about 10days Destiny and Kasandra both examined her and couldn't find anything wrong but applied topical Butt paste 3x's I the last week. Also scheduled the pt for a urgent care appt this Thursday

## 2017-07-12 ENCOUNTER — TELEPHONE (OUTPATIENT)
Dept: GENETICS | Facility: CLINIC | Age: 82
End: 2017-07-12

## 2017-07-13 ENCOUNTER — OFFICE VISIT (OUTPATIENT)
Dept: INTERNAL MEDICINE | Facility: CLINIC | Age: 82
End: 2017-07-13
Payer: MEDICARE

## 2017-07-13 ENCOUNTER — HOSPITAL ENCOUNTER (OUTPATIENT)
Dept: RADIOLOGY | Facility: HOSPITAL | Age: 82
Discharge: HOME | End: 2017-07-13
Attending: NURSE PRACTITIONER
Payer: MEDICARE

## 2017-07-13 VITALS
OXYGEN SATURATION: 98 % | SYSTOLIC BLOOD PRESSURE: 150 MMHG | HEART RATE: 104 BPM | BODY MASS INDEX: 22.72 KG/M2 | WEIGHT: 115.75 LBS | HEIGHT: 60 IN | DIASTOLIC BLOOD PRESSURE: 80 MMHG

## 2017-07-13 DIAGNOSIS — F02.80 LATE ONSET ALZHEIMER'S DISEASE WITHOUT BEHAVIORAL DISTURBANCE: ICD-10-CM

## 2017-07-13 DIAGNOSIS — I10 ESSENTIAL HYPERTENSION: ICD-10-CM

## 2017-07-13 DIAGNOSIS — H91.93 HEARING DIFFICULTY, BILATERAL: ICD-10-CM

## 2017-07-13 DIAGNOSIS — G30.1 LATE ONSET ALZHEIMER'S DISEASE WITHOUT BEHAVIORAL DISTURBANCE: ICD-10-CM

## 2017-07-13 DIAGNOSIS — K62.89 RECTAL PAIN: Primary | ICD-10-CM

## 2017-07-13 DIAGNOSIS — K62.89 RECTAL PAIN: ICD-10-CM

## 2017-07-13 PROCEDURE — 72220 X-RAY EXAM SACRUM TAILBONE: CPT | Mod: 26,,, | Performed by: RADIOLOGY

## 2017-07-13 PROCEDURE — 99999 PR PBB SHADOW E&M-EST. PATIENT-LVL IV: CPT | Mod: PBBFAC,,, | Performed by: NURSE PRACTITIONER

## 2017-07-13 PROCEDURE — 1159F MED LIST DOCD IN RCRD: CPT | Mod: S$GLB,,, | Performed by: NURSE PRACTITIONER

## 2017-07-13 PROCEDURE — 99213 OFFICE O/P EST LOW 20 MIN: CPT | Mod: S$GLB,,, | Performed by: NURSE PRACTITIONER

## 2017-07-13 PROCEDURE — 72220 X-RAY EXAM SACRUM TAILBONE: CPT | Mod: TC

## 2017-07-13 PROCEDURE — 1125F AMNT PAIN NOTED PAIN PRSNT: CPT | Mod: S$GLB,,, | Performed by: NURSE PRACTITIONER

## 2017-07-13 NOTE — PROGRESS NOTES
"INTERNAL MEDICINE URGENT CARE NOTE    CHIEF COMPLAINT     Chief Complaint   Patient presents with    Rectal Pain     x2 weeks ago, pt says painful when sitting, pt fell two weeks ago        HPI     Zeina White is a 89 y.o. female accompanied by daughter with dementia, depression/anxiety, djd, DM, HLD, and HTN who presents for an urgent visit today.  She is an established pt of Dr. Gupta and Dr. Soto.   Pt reports pain to the buttock described as "pins and needles". Recent (2 weeks ago) controlled fall from WC to floor unto buttocks. Pain rated at 5/10. Worse when riding in car or with prolonged sitting     +BM yesterday loose, no diarrhea or constipation.       Past Medical History:  Past Medical History:   Diagnosis Date    Anxiety     Atypical chest pain     Dementia     Diabetes mellitus     DJD (degenerative joint disease)     GERD (gastroesophageal reflux disease)     Hyperlipidemia     Hypertension     Irritable bowel     Tremor, essential     Vertigo        Home Medications:  Prior to Admission medications    Medication Sig Start Date End Date Taking? Authorizing Provider   acetaminophen (TYLENOL) 500 MG tablet Take 500 mg by mouth 3 (three) times daily as needed for Pain.    Historical Provider, MD   blood sugar diagnostic Strp 1 strip by Misc.(Non-Drug; Combo Route) route 2 (two) times daily. May have a 30 or 90 day supply 6/8/17   Kwasi Colon MD   docusate sodium (COLACE) 100 MG capsule Take 100 mg by mouth once daily.    Historical Provider, MD   lisinopril (PRINIVIL,ZESTRIL) 40 MG tablet TAKE 1 TABLET(40 MG) BY MOUTH EVERY DAY 8/29/16   Alan Kaplan MD   meclizine (ANTIVERT) 12.5 mg tablet Take 1 tablet (12.5 mg total) by mouth 3 (three) times daily as needed for Dizziness. 1/26/17   Mahamed Osuna MD   metformin (GLUCOPHAGE) 500 MG tablet TK 1 T PO BID WITH MEALS 6/15/17   Kwasi Colon MD   nortriptyline (PAMELOR) 10 MG capsule  6/3/17   Historical Provider, MD   quetiapine " (SEROQUEL) 25 MG Tab Take 1 tablet at 2-3pm and 2 tablets every evening. 6/30/17   Shahnaz Gupta MD   ranitidine (ZANTAC) 300 MG tablet Take 1 tablet (300 mg total) by mouth 2 (two) times daily. 6/15/17 6/15/18  Kwasi Colon MD   simvastatin (ZOCOR) 40 MG tablet Take 1 tablet (40 mg total) by mouth once daily. 3/24/17   Deborah Castle MD   TRUE METRIX GLUCOSE METER [facility]  6/8/17   Historical Provider, MD   TRUEPLUS LANCETS 28 gauge Misc  6/8/17   Historical Provider, MD       Review of Systems:  Review of Systems   Constitutional: Negative for chills, fatigue, fever and unexpected weight change.   Respiratory: Negative for cough, shortness of breath and wheezing.    Cardiovascular: Negative for chest pain and palpitations.   Gastrointestinal: Positive for rectal pain. Negative for abdominal pain, blood in stool, constipation, diarrhea, nausea and vomiting.   Genitourinary:        Incontinence        Health Maintainence:   Immunizations:  Health Maintenance       Date Due Completion Date    TETANUS VACCINE 04/25/1945 ---    Zoster Vaccine 04/25/1987 ---    Eye Exam 03/07/2017 3/7/2016    Influenza Vaccine 08/01/2017 1/23/2017    Hemoglobin A1c 09/21/2017 3/21/2017    Pneumococcal (65+) (2 of 2 - PPSV23) 01/23/2018 1/23/2017    Lipid Panel 01/23/2018 1/23/2017    Foot Exam 02/24/2018 2/24/2017           PHYSICAL EXAM     BP (!) 150/80 (BP Location: Right arm, Patient Position: Sitting, BP Method: Manual)   Pulse 104   Ht 5' (1.524 m)   Wt 52.5 kg (115 lb 11.9 oz)   SpO2 98%   BMI 22.60 kg/m²     Physical Exam   Constitutional: She appears well-developed and well-nourished.   Eyes: Pupils are equal, round, and reactive to light.   Neck: Normal range of motion. Neck supple.   Cardiovascular: Normal rate and regular rhythm.    Pulmonary/Chest: Effort normal and breath sounds normal.   Abdominal: Soft. Bowel sounds are normal. She exhibits no distension and no mass. There is no tenderness. There is no  rebound and no guarding. No hernia.   Genitourinary:   Genitourinary Comments: Rectal exam- 1 external hemorrhoid not inflamed or thrombosed.   1 small internal hemorrhoid no TTP to palpation.     Coccyx with moderate laxity on palpation. No TTP however.     No rashes or wounds or skin breakdown    Vitals reviewed.      LABS     Lab Results   Component Value Date    HGBA1C 9.6 (H) 03/21/2017     CMP  Sodium   Date Value Ref Range Status   03/22/2017 137 136 - 145 mmol/L Final     Potassium   Date Value Ref Range Status   03/22/2017 3.7 3.5 - 5.1 mmol/L Final     Chloride   Date Value Ref Range Status   03/22/2017 101 95 - 110 mmol/L Final     CO2   Date Value Ref Range Status   03/22/2017 24 23 - 29 mmol/L Final     Glucose   Date Value Ref Range Status   03/22/2017 297 (H) 70 - 110 mg/dL Final     BUN, Bld   Date Value Ref Range Status   03/22/2017 14 8 - 23 mg/dL Final     Creatinine   Date Value Ref Range Status   03/22/2017 0.8 0.5 - 1.4 mg/dL Final     Calcium   Date Value Ref Range Status   03/22/2017 8.4 (L) 8.7 - 10.5 mg/dL Final     Total Protein   Date Value Ref Range Status   03/21/2017 6.4 6.0 - 8.4 g/dL Final     Albumin   Date Value Ref Range Status   03/21/2017 3.4 (L) 3.5 - 5.2 g/dL Final     Total Bilirubin   Date Value Ref Range Status   03/21/2017 0.5 0.1 - 1.0 mg/dL Final     Comment:     For infants and newborns, interpretation of results should be based  on gestational age, weight and in agreement with clinical  observations.  Premature Infant recommended reference ranges:  Up to 24 hours.............<8.0 mg/dL  Up to 48 hours............<12.0 mg/dL  3-5 days..................<15.0 mg/dL  6-29 days.................<15.0 mg/dL       Alkaline Phosphatase   Date Value Ref Range Status   03/21/2017 82 55 - 135 U/L Final     AST   Date Value Ref Range Status   03/21/2017 16 10 - 40 U/L Final     ALT   Date Value Ref Range Status   03/21/2017 16 10 - 44 U/L Final     Anion Gap   Date Value Ref Range  Status   03/22/2017 12 8 - 16 mmol/L Final     eGFR if    Date Value Ref Range Status   03/22/2017 >60.0 >60 mL/min/1.73 m^2 Final     eGFR if non    Date Value Ref Range Status   03/22/2017 >60.0 >60 mL/min/1.73 m^2 Final     Comment:     Calculation used to obtain the estimated glomerular filtration  rate (eGFR) is the CKD-EPI equation. Since race is unknown   in our information system, the eGFR values for   -American and Non--American patients are given   for each creatinine result.       Lab Results   Component Value Date    WBC 7.72 03/24/2017    HGB 11.4 (L) 03/24/2017    HCT 34.2 (L) 03/24/2017    MCV 88 03/24/2017     03/24/2017     Lab Results   Component Value Date    CHOL 177 01/23/2017    CHOL 176 09/19/2016    CHOL 161 12/09/2013     Lab Results   Component Value Date    HDL 34 (L) 01/23/2017    HDL 36 (L) 09/19/2016    HDL 37 (L) 12/09/2013     Lab Results   Component Value Date    LDLCALC 102.4 01/23/2017    LDLCALC 114.0 09/19/2016    LDLCALC 101.6 12/09/2013     Lab Results   Component Value Date    TRIG 203 (H) 01/23/2017    TRIG 130 09/19/2016    TRIG 112 12/09/2013     Lab Results   Component Value Date    CHOLHDL 19.2 (L) 01/23/2017    CHOLHDL 20.5 09/19/2016    CHOLHDL 23.0 12/09/2013     Lab Results   Component Value Date    TSH 1.998 01/23/2017       ASSESSMENT/PLAN     Zeina White is a 89 y.o. female with  Past Medical History:   Diagnosis Date    Anxiety     Atypical chest pain     Dementia     Diabetes mellitus     DJD (degenerative joint disease)     GERD (gastroesophageal reflux disease)     Hyperlipidemia     Hypertension     Irritable bowel     Tremor, essential     Vertigo      Rectal pain- phsyical exam normal, moderate coccyx laxity. Will send for images. ?fx during fall. Advised use of donut pillow when in car or with prolonged sitting. May cont tylenol as needed.   -     X-Ray Sacrum And Coccyx; Future; Expected  date: 07/13/2017    Essential hypertension- at goal. Cont current meds. Low Na diet.     Hearing difficulty, bilateral- stable. Use of dry erase board for communication     Late onset Alzheimer's disease without behavioral disturbance- complaint with seroquel as directed. Still with some outbursts of anger but alert at visit.           Follow up as needed and with PCP for annual     Patient education provided from [facility]. Patient was counseled on when and how to seek emergent care.       Claritza SALDIVAR, CARLY, FNP-c   Department of Internal Medicine - [facility]  9:03 AM

## 2017-07-14 ENCOUNTER — TELEPHONE (OUTPATIENT)
Dept: INTERNAL MEDICINE | Facility: CLINIC | Age: 82
End: 2017-07-14

## 2017-07-14 NOTE — TELEPHONE ENCOUNTER
Spoke with Destiny. Images of the tailbone inconclusive. Cannot r/o fracture. Will continue treatment as discussed in office. Donut pillow and tylenol. Pt's daughter is agreement

## 2017-07-25 ENCOUNTER — OFFICE VISIT (OUTPATIENT)
Dept: PSYCHIATRY | Facility: CLINIC | Age: 82
End: 2017-07-25
Payer: MEDICARE

## 2017-07-25 VITALS
HEIGHT: 60 IN | WEIGHT: 114 LBS | BODY MASS INDEX: 22.38 KG/M2 | DIASTOLIC BLOOD PRESSURE: 98 MMHG | HEART RATE: 108 BPM | SYSTOLIC BLOOD PRESSURE: 175 MMHG

## 2017-07-25 DIAGNOSIS — F33.41 RECURRENT MAJOR DEPRESSIVE DISORDER, IN PARTIAL REMISSION: ICD-10-CM

## 2017-07-25 DIAGNOSIS — G47.00 INSOMNIA, UNSPECIFIED TYPE: ICD-10-CM

## 2017-07-25 DIAGNOSIS — F03.91 DEMENTIA WITH BEHAVIORAL DISTURBANCE, UNSPECIFIED DEMENTIA TYPE: Primary | ICD-10-CM

## 2017-07-25 DIAGNOSIS — R45.1 AGITATION: ICD-10-CM

## 2017-07-25 PROBLEM — F03.918 DEMENTIA WITH BEHAVIORAL DISTURBANCE: Status: ACTIVE | Noted: 2017-07-25

## 2017-07-25 PROCEDURE — 99999 PR PBB SHADOW E&M-EST. PATIENT-LVL III: CPT | Mod: PBBFAC,,, | Performed by: INTERNAL MEDICINE

## 2017-07-25 PROCEDURE — 1159F MED LIST DOCD IN RCRD: CPT | Mod: S$GLB,,, | Performed by: INTERNAL MEDICINE

## 2017-07-25 PROCEDURE — 99499 UNLISTED E&M SERVICE: CPT | Mod: S$GLB,,, | Performed by: INTERNAL MEDICINE

## 2017-07-25 PROCEDURE — 99214 OFFICE O/P EST MOD 30 MIN: CPT | Mod: S$GLB,,, | Performed by: INTERNAL MEDICINE

## 2017-07-25 RX ORDER — DIAZEPAM 2 MG/1
2 TABLET ORAL
Qty: 3 TABLET | Refills: 0 | Status: SHIPPED | OUTPATIENT
Start: 2017-07-25 | End: 2017-11-16
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
## 2017-07-26 ENCOUNTER — TELEPHONE (OUTPATIENT)
Dept: INTERNAL MEDICINE | Facility: CLINIC | Age: 82
End: 2017-07-26

## 2017-07-26 ENCOUNTER — OFFICE VISIT (OUTPATIENT)
Dept: PODIATRY | Facility: CLINIC | Age: 82
End: 2017-07-26
Payer: MEDICARE

## 2017-07-26 VITALS
SYSTOLIC BLOOD PRESSURE: 146 MMHG | HEART RATE: 48 BPM | DIASTOLIC BLOOD PRESSURE: 90 MMHG | BODY MASS INDEX: 22.26 KG/M2 | HEIGHT: 60 IN

## 2017-07-26 DIAGNOSIS — I10 ESSENTIAL HYPERTENSION: ICD-10-CM

## 2017-07-26 DIAGNOSIS — M89.9 DISORDER OF BONE: ICD-10-CM

## 2017-07-26 DIAGNOSIS — F33.41 RECURRENT MAJOR DEPRESSIVE DISORDER, IN PARTIAL REMISSION: ICD-10-CM

## 2017-07-26 DIAGNOSIS — M20.41 HAMMER TOES OF BOTH FEET: ICD-10-CM

## 2017-07-26 DIAGNOSIS — E11.49 TYPE II DIABETES MELLITUS WITH NEUROLOGICAL MANIFESTATIONS: Primary | ICD-10-CM

## 2017-07-26 DIAGNOSIS — F03.91 DEMENTIA WITH BEHAVIORAL DISTURBANCE, UNSPECIFIED DEMENTIA TYPE: Primary | ICD-10-CM

## 2017-07-26 DIAGNOSIS — E78.5 HYPERLIPIDEMIA, UNSPECIFIED HYPERLIPIDEMIA TYPE: ICD-10-CM

## 2017-07-26 DIAGNOSIS — R25.1 TREMOR: ICD-10-CM

## 2017-07-26 DIAGNOSIS — L84 CORN OR CALLUS: ICD-10-CM

## 2017-07-26 DIAGNOSIS — M20.42 HAMMER TOES OF BOTH FEET: ICD-10-CM

## 2017-07-26 DIAGNOSIS — B35.1 ONYCHOMYCOSIS DUE TO DERMATOPHYTE: ICD-10-CM

## 2017-07-26 DIAGNOSIS — E11.49 TYPE II DIABETES MELLITUS WITH NEUROLOGICAL MANIFESTATIONS: ICD-10-CM

## 2017-07-26 PROCEDURE — 1126F AMNT PAIN NOTED NONE PRSNT: CPT | Mod: S$GLB,,, | Performed by: PODIATRIST

## 2017-07-26 PROCEDURE — 1159F MED LIST DOCD IN RCRD: CPT | Mod: S$GLB,,, | Performed by: PODIATRIST

## 2017-07-26 PROCEDURE — 99499 UNLISTED E&M SERVICE: CPT | Mod: S$GLB,,, | Performed by: PODIATRIST

## 2017-07-26 PROCEDURE — 11055 PARING/CUTG B9 HYPRKER LES 1: CPT | Mod: Q9,S$GLB,, | Performed by: PODIATRIST

## 2017-07-26 PROCEDURE — 99999 PR PBB SHADOW E&M-EST. PATIENT-LVL III: CPT | Mod: PBBFAC,,, | Performed by: PODIATRIST

## 2017-07-26 PROCEDURE — 99213 OFFICE O/P EST LOW 20 MIN: CPT | Mod: 25,S$GLB,, | Performed by: PODIATRIST

## 2017-07-26 PROCEDURE — 11721 DEBRIDE NAIL 6 OR MORE: CPT | Mod: 59,Q9,S$GLB, | Performed by: PODIATRIST

## 2017-07-26 NOTE — TELEPHONE ENCOUNTER
Please call daughter and schedule MRI brain, EKG, and non-fasting labs.  Patient has an appointment in [place] this morning at 9:45 and would like to get labs drawn there after the appointment if possible.

## 2017-07-26 NOTE — PROGRESS NOTES
Subjective:      Patient ID: Zeina White is a 89 y.o. female.    Chief Complaint: Diabetes Mellitus (PCP Dr. Kaplan 4/25/17) and Foot Problem (left ft., dark spot on her sole)     has a past medical history of Anxiety; Atypical chest pain; Dementia; Diabetes mellitus; DJD (degenerative joint disease); GERD (gastroesophageal reflux disease); Hyperlipidemia; Hypertension; Irritable bowel; Tremor, essential; and Vertigo.    Presents for DM foot exam. Daughter present for visit. Also reports some numbness and tingling to both feet.      Review of Systems   Cardiovascular: Negative for chest pain and claudication.   Respiratory: Negative for cough.    Skin: Positive for color change, dry skin and nail changes.   Musculoskeletal: Positive for joint pain.   Gastrointestinal: Negative for nausea.   Neurological: Positive for numbness and paresthesias.           Objective:      Physical Exam   Constitutional: She appears well-developed.   Psychiatric: She has a normal mood and affect.     Cardiovascular:   Dorsalis pedis and posterior tibial pulses are diminished Bilaterally. Toes are cool to touch. Feet are warm proximally.There is decreased digital hair. Skin is atrophic, slightly hyperpigmented, and mildly edematous      Pulmonary/Chest: No respiratory distress.     Musculoskeletal: She exhibits tenderness at toes 1 and 2 on right foot. She exhibits no edema.   Adequate joint range of motion without pain, limitation, nor crepitation Bilateral feet and ankle joints. Muscle strength is 5/5 in all groups bilaterally.       Neurological: She is alert.   Morton-Iván 5.07 monofilamant testing is diminished Jag feet. Sharp/dull sensation diminished Bilaterally. Light touch absent Bilaterally.      Skin: Skin is dry. No erythema.   Nails x10 are elongated by 2-6mm's, thickened by 3-5 mm's, dystrophic, and are darkened in coloration . Xerosis Bilaterally. No open lesions noted   Callus noted at b/l third toe distally     "Feet:   Right Foot:   Skin Integrity: Positive for callus and dry skin. Negative for ulcer or skin breakdown.   Left Foot:   Skin Integrity: Positive for dry skin. Negative for ulcer.              Assessment:       Encounter Diagnoses   Name Primary?    Type II diabetes mellitus with neurological manifestations Yes    Corn or callus     Hammer toes of both feet     Onychomycosis due to dermatophyte        Narrative   3 views: There is a severe hallux valgus deformity with DJD.  There are spurs on the calcaneus.  No fracture dislocation bone destruction seen.      Electronically signed by: RAMIREZ GOLD  Date: 10/26/16  Time: 11:23            Plan:       Zeina was seen today for diabetes mellitus and foot problem.    Diagnoses and all orders for this visit:    Type II diabetes mellitus with neurological manifestations  -     Foot Care    Corn or callus    Hammer toes of both feet    Onychomycosis due to dermatophyte    Routine Foot Care  Date/Time: 7/26/2017 10:23 AM  Performed by: JD SNEED JR.  Authorized by: JD SNEED JR.     Time out: Immediately prior to procedure a "time out" was called to verify the correct patient, procedure, equipment, support staff and site/side marked as required.    Consent Done?:  Yes (Verbal)  Hyperkeratotic Skin Lesions?: Yes    Number of trimmed lesions:  1  Location(s):  Left Plantar    Nail Care Type:  Debride  Location(s): All  (Left 1st Toe, Left 3rd Toe, Left 2nd Toe, Left 4th Toe, Left 5th Toe, Right 1st Toe, Right 2nd Toe, Right 3rd Toe, Right 4th Toe and Right 5th Toe)  Patient tolerance:  Patient tolerated the procedure well with no immediate complications        ADA Risk Classification: LOPS with or without deformity - 1: rtc 3-6 months       I counseled the patient on her conditions, their implications and medical management.    - Shoe inspection. Diabetic Foot Education. Patient reminded of the importance of good nutrition and blood sugar control to help " prevent podiatric complications of diabetes. Patient instructed on proper foot hygeine. We discussed wearing proper shoe gear, daily foot inspections, never walking without protective shoe gear, never putting sharp instruments to feet    -dispensed offloading pads for toes 1 and 2      rtc 3-6 months

## 2017-08-01 ENCOUNTER — HOSPITAL ENCOUNTER (OUTPATIENT)
Dept: CARDIOLOGY | Facility: CLINIC | Age: 82
Discharge: HOME | End: 2017-08-01
Payer: MEDICARE

## 2017-08-01 DIAGNOSIS — F03.91 DEMENTIA WITH BEHAVIORAL DISTURBANCE, UNSPECIFIED DEMENTIA TYPE: ICD-10-CM

## 2017-08-01 PROCEDURE — 93000 ELECTROCARDIOGRAM COMPLETE: CPT | Mod: S$GLB,,, | Performed by: INTERNAL MEDICINE

## 2017-08-03 ENCOUNTER — OFFICE VISIT (OUTPATIENT)
Dept: INTERNAL MEDICINE | Facility: CLINIC | Age: 82
End: 2017-08-03
Payer: MEDICARE

## 2017-08-03 VITALS
WEIGHT: 114 LBS | HEART RATE: 93 BPM | BODY MASS INDEX: 24.59 KG/M2 | DIASTOLIC BLOOD PRESSURE: 80 MMHG | HEIGHT: 57 IN | SYSTOLIC BLOOD PRESSURE: 150 MMHG

## 2017-08-03 DIAGNOSIS — M19.90 OSTEOARTHRITIS, UNSPECIFIED OSTEOARTHRITIS TYPE, UNSPECIFIED SITE: ICD-10-CM

## 2017-08-03 DIAGNOSIS — I10 ESSENTIAL HYPERTENSION: Primary | ICD-10-CM

## 2017-08-03 DIAGNOSIS — E78.5 HYPERLIPIDEMIA, UNSPECIFIED HYPERLIPIDEMIA TYPE: ICD-10-CM

## 2017-08-03 DIAGNOSIS — E53.8 LOW SERUM VITAMIN B12: ICD-10-CM

## 2017-08-03 DIAGNOSIS — M81.0 OSTEOPOROSIS, UNSPECIFIED OSTEOPOROSIS TYPE, UNSPECIFIED PATHOLOGICAL FRACTURE PRESENCE: ICD-10-CM

## 2017-08-03 DIAGNOSIS — F03.91 DEMENTIA WITH BEHAVIORAL DISTURBANCE, UNSPECIFIED DEMENTIA TYPE: ICD-10-CM

## 2017-08-03 DIAGNOSIS — Q99.2 FRAGILE X SYNDROME: ICD-10-CM

## 2017-08-03 DIAGNOSIS — K21.9 GASTROESOPHAGEAL REFLUX DISEASE, ESOPHAGITIS PRESENCE NOT SPECIFIED: ICD-10-CM

## 2017-08-03 DIAGNOSIS — F33.41 RECURRENT MAJOR DEPRESSIVE DISORDER, IN PARTIAL REMISSION: ICD-10-CM

## 2017-08-03 DIAGNOSIS — E11.49 TYPE II DIABETES MELLITUS WITH NEUROLOGICAL MANIFESTATIONS: ICD-10-CM

## 2017-08-03 PROCEDURE — 99499 UNLISTED E&M SERVICE: CPT | Mod: S$GLB,,, | Performed by: INTERNAL MEDICINE

## 2017-08-03 PROCEDURE — 99999 PR PBB SHADOW E&M-EST. PATIENT-LVL III: CPT | Mod: PBBFAC,,, | Performed by: INTERNAL MEDICINE

## 2017-08-03 PROCEDURE — 1126F AMNT PAIN NOTED NONE PRSNT: CPT | Mod: S$GLB,,, | Performed by: INTERNAL MEDICINE

## 2017-08-03 PROCEDURE — 99214 OFFICE O/P EST MOD 30 MIN: CPT | Mod: S$GLB,,, | Performed by: INTERNAL MEDICINE

## 2017-08-03 PROCEDURE — 1159F MED LIST DOCD IN RCRD: CPT | Mod: S$GLB,,, | Performed by: INTERNAL MEDICINE

## 2017-08-03 PROCEDURE — 3008F BODY MASS INDEX DOCD: CPT | Mod: S$GLB,,, | Performed by: INTERNAL MEDICINE

## 2017-08-03 NOTE — PATIENT INSTRUCTIONS
1. Start taking vitamin B12 1000mcg daily.  2. Make sure that you consume 1200 mg of calcium per day through including diet and supplement - most women require around around 500 to 1000 mg per day of calcium supplement if their diet is inadequate.  Make sure you are taking at least 800 units of vitamin D supplement per day.  3. Monitor blood pressure daily and bring blood pressure log to next appointment.

## 2017-08-03 NOTE — PROGRESS NOTES
Internal Medicine    Subjective:      Patient ID: Zeina White is a 89 y.o. female.    Chief Complaint: Follow-up    Presents to establish care.    HTN:  Taking lisinopril 40mg daily.  Denies side effects    HLD:  Taking simvistatin 40mg daily.  Denies side effects.    DM2:  Taking metformin 500mg BID.  Denies side effects.  HgA1c 7.6 on 8/1/17.    DJD:  Recently had left shoulder pain, none currently.  Takes Tylenol PRN (rarely)    GERD:  Takes Zantac 300mg BID.      H/o vertigo:  No symptoms currently.  Takes meclizine PRN (very rarely).    Osteoporosis:  Last DEXA in 2009 showing osteoporosis of hip, recommendation was repeat in 2 years.  Daughter unaware that patient ever had osteoporosis - says she was never treated.  Patient has had hip fracture since then.  Declining repeat DEXA today, but will continue to discuss.      Vit B12:  Borderline low at 221.  Not on replacement at this time.      Dementia with behavioral disturbance:  Unclear etiology.  Had some memory loss prior to hip fracture, but memory and behavior acutely worsened after hip fracture.  Taking Seroquel 25mg at 3pm and 62.5mg at 6pm and nortriptyline 10mg qHS.  Has 24/7 sitters.  EKG with QTc 449.  MRI brain and labs ordered at last psych appointment.  Neurology referral placed.      Fragile X Syndrome.          Review of Systems   Constitutional: Negative for appetite change, chills, fatigue, fever and unexpected weight change.   HENT: Positive for hearing loss. Negative for sore throat and trouble swallowing.    Eyes: Negative for visual disturbance.   Respiratory: Negative for cough, chest tightness, shortness of breath and wheezing.    Cardiovascular: Negative for chest pain, palpitations and leg swelling.   Gastrointestinal: Negative for abdominal pain, blood in stool, constipation, diarrhea, nausea and vomiting.   Genitourinary: Negative for difficulty urinating.   Musculoskeletal: Negative for arthralgias and myalgias.   Skin:  "Negative for rash and wound.   Neurological: Negative for dizziness, weakness, numbness and headaches.   Hematological: Negative for adenopathy.   Psychiatric/Behavioral: Positive for confusion (On and off per family). Negative for behavioral problems.       Past medical history, surgical history, and family medical history reviewed and updated as appropriate.    Medications and allergies reviewed.     Objective:     BP (!) 150/80   Pulse 93   Ht 4' 8.69" (1.44 m)   Wt 51.7 kg (114 lb)   BMI 24.94 kg/m²   Physical Exam   Constitutional: She is oriented to person, place, and time. She appears well-developed and well-nourished. No distress.   Frail, elderly female.  Sitting in wheelchair.   HENT:   Head: Normocephalic and atraumatic.   Eyes: Conjunctivae and EOM are normal. No scleral icterus.   Cardiovascular: Normal rate and regular rhythm.  Exam reveals no gallop and no friction rub.    No murmur heard.  Pulmonary/Chest: Effort normal and breath sounds normal. No respiratory distress. She has no wheezes. She has no rales.   Abdominal: Soft. She exhibits no distension. There is no tenderness. There is no guarding.   Musculoskeletal: She exhibits no edema or tenderness.   Neurological: She is alert and oriented to person, place, and time.   Poor hearing.   Skin: No rash noted. No erythema.   Psychiatric: She has a normal mood and affect. Her behavior is normal.       RESULTS:   Lab Results   Component Value Date    WBC 7.45 08/01/2017    HGB 13.3 08/01/2017    HCT 40.6 08/01/2017    MCV 89 08/01/2017     08/01/2017     BMP  Lab Results   Component Value Date     08/01/2017    K 4.0 08/01/2017     08/01/2017    CO2 32 (H) 08/01/2017    BUN 12 08/01/2017    CREATININE 0.7 08/01/2017    CALCIUM 9.0 08/01/2017    ANIONGAP 8 08/01/2017    ESTGFRAFRICA >60.0 08/01/2017    EGFRNONAA >60.0 08/01/2017     Lab Results   Component Value Date    ALT 19 08/01/2017    AST 16 08/01/2017    ALKPHOS 83 " 08/01/2017    BILITOT 0.3 08/01/2017     Lab Results   Component Value Date    TSH 1.213 08/01/2017     Lab Results   Component Value Date    HGBA1C 7.6 (H) 08/01/2017       Assessment:     1. Essential hypertension    2. Hyperlipidemia, unspecified hyperlipidemia type    3. Type II diabetes mellitus with neurological manifestations    4. Dementia with behavioral disturbance, unspecified dementia type    5. Osteoarthritis, unspecified osteoarthritis type, unspecified site    6. Osteoporosis, unspecified osteoporosis type, unspecified pathological fracture presence    7. Recurrent major depressive disorder, in partial remission    8. Gastroesophageal reflux disease, esophagitis presence not specified    9. Low serum vitamin B12    10. Fragile X syndrome        Plan:   June was seen today for follow-up.    Diagnoses and all orders for this visit:    Essential hypertension   High BP today.  Recommend checking BP daily and bring log to next appointment.    Hyperlipidemia, unspecified hyperlipidemia type  Type II diabetes mellitus with neurological manifestations   Continue medication/plan as discussed in HPI.    Dementia with behavioral disturbance, unspecified dementia type   Followed by myself in Psychiatry.  Continue medication/plan as discussed in HPI.  -     Ambulatory referral to Neurology    Osteoarthritis, unspecified osteoarthritis type, unspecified site   Tylenol PRN.    Osteoporosis, unspecified osteoporosis type, unspecified pathological fracture presence  Recent hip fracture   Discussed recommended doses of calcium and vitamin D supplementation.  Discussed DEXA and/or bisphosphonate.  Daughter would like to defer at this time, but will plan to discuss again at next appointment    Recurrent major depressive disorder, in partial remission   Continue medication/plan as discussed in HPI.    Gastroesophageal reflux disease, esophagitis presence not specified   Continue medication/plan as discussed in HPI.    Low  serum vitamin B12   Start B12 1000mcg daily.    Fragile X syndrome    Health maintenance reviewed with patient.     Return in about 6 weeks (around 9/14/2017).    Shahnaz Gupta MD  Internal Medicine  [facility]

## 2017-08-14 ENCOUNTER — PATIENT MESSAGE (OUTPATIENT)
Dept: NEUROLOGY | Facility: CLINIC | Age: 82
End: 2017-08-14

## 2017-09-04 ENCOUNTER — PATIENT MESSAGE (OUTPATIENT)
Dept: INTERNAL MEDICINE | Facility: CLINIC | Age: 82
End: 2017-09-04

## 2017-09-06 ENCOUNTER — TELEPHONE (OUTPATIENT)
Dept: NEUROLOGY | Facility: CLINIC | Age: 82
End: 2017-09-06

## 2017-09-06 NOTE — TELEPHONE ENCOUNTER
Vonnie has accepted a sooner appt with Dr. Churchill(Dr. Gupta's request) for 9/20/17 @ 3:20pm. She will now contact Dr. Gupta's office to r/s the appt with her office.

## 2017-09-06 NOTE — TELEPHONE ENCOUNTER
----- Message from Anshu Raza sent at 9/6/2017  9:33 AM CDT -----  Contact: Destiny (daughter) @ [phone]  Pt schedule for 09/29 w/ doctor but Destiny says pt's internal medicine doctor ,Dr. Shahnaz Gupta, is asking the pt be seen before Dr. Gupta leaves at the end of the month. Pls call if the pt can be seen sooner.

## 2017-09-07 ENCOUNTER — TELEPHONE (OUTPATIENT)
Dept: INTERNAL MEDICINE | Facility: CLINIC | Age: 82
End: 2017-09-07

## 2017-09-07 ENCOUNTER — PATIENT MESSAGE (OUTPATIENT)
Dept: INTERNAL MEDICINE | Facility: CLINIC | Age: 82
End: 2017-09-07

## 2017-09-07 DIAGNOSIS — E11.9 DIABETES MELLITUS WITHOUT COMPLICATION: ICD-10-CM

## 2017-09-07 NOTE — TELEPHONE ENCOUNTER
Spoke with pt's daughter and she stated that the pt had to reschedule her MRI and wanted to know if the pt should still come to her appt 9/15 with you or rescheduled to see a different provider

## 2017-09-07 NOTE — TELEPHONE ENCOUNTER
----- Message from Gopi Jaeger sent at 9/7/2017  9:12 AM CDT -----  Contact: Destiny daughter [phone]  Requesting a call back regarding her Mom's apt scheduled for 09/18/2017, advice    Thanks

## 2017-09-11 RX ORDER — LISINOPRIL 40 MG/1
TABLET ORAL
Qty: 90 TABLET | Refills: 3 | Status: SHIPPED | OUTPATIENT
Start: 2017-09-11 | End: 2018-09-18

## 2017-09-14 ENCOUNTER — HOSPITAL ENCOUNTER (OUTPATIENT)
Dept: RADIOLOGY | Facility: HOSPITAL | Age: 82
Discharge: HOME | End: 2017-09-14
Attending: INTERNAL MEDICINE
Payer: MEDICARE

## 2017-09-14 ENCOUNTER — PATIENT MESSAGE (OUTPATIENT)
Dept: INTERNAL MEDICINE | Facility: CLINIC | Age: 82
End: 2017-09-14

## 2017-09-14 DIAGNOSIS — F03.91 DEMENTIA WITH BEHAVIORAL DISTURBANCE, UNSPECIFIED DEMENTIA TYPE: ICD-10-CM

## 2017-09-14 PROCEDURE — A9585 GADOBUTROL INJECTION: HCPCS | Performed by: INTERNAL MEDICINE

## 2017-09-14 PROCEDURE — 25500020 PHARM REV CODE 255: Performed by: INTERNAL MEDICINE

## 2017-09-14 PROCEDURE — 70553 MRI BRAIN STEM W/O & W/DYE: CPT | Mod: 26,,, | Performed by: RADIOLOGY

## 2017-09-14 PROCEDURE — 70553 MRI BRAIN STEM W/O & W/DYE: CPT | Mod: TC

## 2017-09-14 RX ORDER — GADOBUTROL 604.72 MG/ML
5 INJECTION INTRAVENOUS
Status: COMPLETED | OUTPATIENT
Start: 2017-09-14 | End: 2017-09-14

## 2017-09-14 RX ORDER — QUETIAPINE FUMARATE 25 MG/1
TABLET, FILM COATED ORAL
Qty: 120 TABLET | Refills: 3 | Status: SHIPPED | OUTPATIENT
Start: 2017-09-14 | End: 2018-01-02 | Stop reason: SDUPTHER

## 2017-09-14 RX ORDER — QUETIAPINE FUMARATE 25 MG/1
TABLET, FILM COATED ORAL
Qty: 120 TABLET | Refills: 3 | OUTPATIENT
Start: 2017-09-14

## 2017-09-14 RX ADMIN — GADOBUTROL 5 ML: 604.72 INJECTION INTRAVENOUS at 10:09

## 2017-09-18 ENCOUNTER — OFFICE VISIT (OUTPATIENT)
Dept: INTERNAL MEDICINE | Facility: CLINIC | Age: 82
End: 2017-09-18
Payer: MEDICARE

## 2017-09-18 ENCOUNTER — IMMUNIZATION (OUTPATIENT)
Dept: INTERNAL MEDICINE | Facility: CLINIC | Age: 82
End: 2017-09-18
Payer: MEDICARE

## 2017-09-18 VITALS
HEIGHT: 56 IN | WEIGHT: 116.38 LBS | DIASTOLIC BLOOD PRESSURE: 70 MMHG | SYSTOLIC BLOOD PRESSURE: 164 MMHG | HEART RATE: 70 BPM | BODY MASS INDEX: 26.18 KG/M2

## 2017-09-18 DIAGNOSIS — E53.8 LOW SERUM VITAMIN B12: ICD-10-CM

## 2017-09-18 DIAGNOSIS — E78.5 HYPERLIPIDEMIA, UNSPECIFIED HYPERLIPIDEMIA TYPE: ICD-10-CM

## 2017-09-18 DIAGNOSIS — F03.91 DEMENTIA WITH BEHAVIORAL DISTURBANCE, UNSPECIFIED DEMENTIA TYPE: ICD-10-CM

## 2017-09-18 DIAGNOSIS — K21.9 GASTROESOPHAGEAL REFLUX DISEASE, ESOPHAGITIS PRESENCE NOT SPECIFIED: ICD-10-CM

## 2017-09-18 DIAGNOSIS — F33.41 RECURRENT MAJOR DEPRESSIVE DISORDER, IN PARTIAL REMISSION: ICD-10-CM

## 2017-09-18 DIAGNOSIS — H91.93 HEARING DIFFICULTY, BILATERAL: ICD-10-CM

## 2017-09-18 DIAGNOSIS — G25.0 TREMOR, ESSENTIAL: ICD-10-CM

## 2017-09-18 DIAGNOSIS — E11.49 TYPE II DIABETES MELLITUS WITH NEUROLOGICAL MANIFESTATIONS: ICD-10-CM

## 2017-09-18 DIAGNOSIS — I10 ESSENTIAL HYPERTENSION: Primary | ICD-10-CM

## 2017-09-18 DIAGNOSIS — R42 VERTIGO: ICD-10-CM

## 2017-09-18 DIAGNOSIS — Q99.2 FRAGILE X SYNDROME: ICD-10-CM

## 2017-09-18 PROCEDURE — 99499 UNLISTED E&M SERVICE: CPT | Mod: S$GLB,,, | Performed by: INTERNAL MEDICINE

## 2017-09-18 PROCEDURE — 1159F MED LIST DOCD IN RCRD: CPT | Mod: S$GLB,,, | Performed by: INTERNAL MEDICINE

## 2017-09-18 PROCEDURE — 3008F BODY MASS INDEX DOCD: CPT | Mod: S$GLB,,, | Performed by: INTERNAL MEDICINE

## 2017-09-18 PROCEDURE — G0008 ADMIN INFLUENZA VIRUS VAC: HCPCS | Mod: S$GLB,,, | Performed by: INTERNAL MEDICINE

## 2017-09-18 PROCEDURE — 1126F AMNT PAIN NOTED NONE PRSNT: CPT | Mod: S$GLB,,, | Performed by: INTERNAL MEDICINE

## 2017-09-18 PROCEDURE — 99999 PR PBB SHADOW E&M-EST. PATIENT-LVL III: CPT | Mod: PBBFAC,,, | Performed by: INTERNAL MEDICINE

## 2017-09-18 PROCEDURE — 99214 OFFICE O/P EST MOD 30 MIN: CPT | Mod: S$GLB,,, | Performed by: INTERNAL MEDICINE

## 2017-09-18 PROCEDURE — 90662 IIV NO PRSV INCREASED AG IM: CPT | Mod: S$GLB,,, | Performed by: INTERNAL MEDICINE

## 2017-09-18 RX ORDER — AMLODIPINE BESYLATE 5 MG/1
5 TABLET ORAL DAILY
Qty: 30 TABLET | Refills: 11 | Status: SHIPPED | OUTPATIENT
Start: 2017-09-18 | End: 2018-02-08 | Stop reason: SDUPTHER

## 2017-09-18 RX ORDER — MULTIVIT WITH MINERALS/HERBS
1 TABLET ORAL DAILY
COMMUNITY

## 2017-09-18 NOTE — PROGRESS NOTES
Internal Medicine    Subjective:      Patient ID: Zeina White is a 89 y.o. female.    Chief Complaint: Follow-up (essential hypertension)    Presents for follow up appointment.  Presents with daughter.  Last seen 8/3/17.      HTN:  BP was also high at last appointment.  Recommended checking BP daily and bringing log.  Taking lisinopril 40mg daily.  Denies side effects.  Does not have blood pressure log today.  Has been consistently high at appointments.       HLD:  Taking simvistatin 40mg daily.  Denies side effects.     DM2:  Taking metformin 500mg BID.  Denies side effects.  HgA1c 7.6 on 8/1/17.      GERD:  Takes Zantac 300mg BID.       H/o vertigo:  No symptoms currently.  Takes meclizine PRN (very rarely).     Osteoporosis:  Last DEXA in 2009 showing osteoporosis of hip, recommendation was repeat in 2 years.  Daughter unaware that patient ever had osteoporosis - says she was never treated.  Patient has had hip fracture since then.  Declining repeat DEXA.  Daughter does not believe patient could sit up long enough to take bisphosphonate.  Discussed option of intravenous.     Vit B12:  Borderline low at 221 on 8/1/17.  Started on B12 1000mcg daily.     Dementia with behavioral disturbance:  Unclear etiology.  Had some memory loss prior to hip fracture, but memory and behavior acutely worsened after hip fracture.  Taking Seroquel 37.5mg at 3pm and 62.5mg at 6pm and nortriptyline 10mg qHS.  Has 24/7 sitters.  EKG with QTc 449.  Neurology appointment on 9/20/17.  MRI brain 9/14/17:  Subcentimeter focus of enhancement within the left posterior superior thalamus with questioned partial corresponding diffusion hyperintensity configuration concerning for subacute age lacunar type infarct with separate remote lacunar type infarct in the thalamus inferiorly.  Alternative differential includes enhancing mass including metastases although felt less likely.  Superimposed age-appropriate generalized cerebral volume loss  "with scattered foci of T2 flair signal hyperintensity suggestive for mild chronic ischemic change.  No restricted diffusion to suggest acute infarction.     Fragile X Syndrome.          Review of Systems   Constitutional: Negative for appetite change, chills, fatigue, fever and unexpected weight change.   Eyes: Negative for visual disturbance.   Respiratory: Negative for cough, chest tightness, shortness of breath and wheezing.    Cardiovascular: Negative for chest pain, palpitations and leg swelling.   Gastrointestinal: Negative for abdominal pain, blood in stool, constipation, diarrhea, nausea and vomiting.   Genitourinary: Negative for difficulty urinating.   Musculoskeletal: Positive for arthralgias. Negative for myalgias.   Skin: Negative for rash and wound.   Neurological: Negative for dizziness, weakness, numbness and headaches.   Psychiatric/Behavioral: Positive for behavioral problems and confusion.       Past medical history, surgical history, and family medical history reviewed and updated as appropriate.    Medications and allergies reviewed.     Objective:     Vitals:    09/18/17 1054   BP: (!) 164/70   Pulse: 70   Weight: 52.8 kg (116 lb 6.5 oz)   Height: 4' 8" (1.422 m)     Physical Exam   Constitutional: She appears well-developed and well-nourished. No distress.   HENT:   Head: Normocephalic and atraumatic.   Eyes: Conjunctivae and EOM are normal. No scleral icterus.   Cardiovascular: Normal rate and regular rhythm.  Exam reveals no gallop and no friction rub.    No murmur heard.  Pulmonary/Chest: Effort normal and breath sounds normal. No respiratory distress. She has no wheezes. She has no rales.   Abdominal: Soft. She exhibits no distension.   Musculoskeletal: She exhibits no edema or tenderness.   Neurological: She is alert.   Skin: No rash noted. No erythema.   Psychiatric: She has a normal mood and affect. Her behavior is normal.       RESULTS:   Lab Results   Component Value Date    WBC 7.45 " 08/01/2017    HGB 13.3 08/01/2017    HCT 40.6 08/01/2017    MCV 89 08/01/2017     08/01/2017     BMP  Lab Results   Component Value Date     08/01/2017    K 4.0 08/01/2017     08/01/2017    CO2 32 (H) 08/01/2017    BUN 12 08/01/2017    CREATININE 0.7 08/01/2017    CALCIUM 9.0 08/01/2017    ANIONGAP 8 08/01/2017    ESTGFRAFRICA >60.0 08/01/2017    EGFRNONAA >60.0 08/01/2017     Lab Results   Component Value Date    ALT 19 08/01/2017    AST 16 08/01/2017    ALKPHOS 83 08/01/2017    BILITOT 0.3 08/01/2017     Lab Results   Component Value Date    TSH 1.213 08/01/2017     Lab Results   Component Value Date    HGBA1C 7.6 (H) 08/01/2017         Assessment:     1. Essential hypertension    2. Hyperlipidemia, unspecified hyperlipidemia type    3. Type II diabetes mellitus with neurological manifestations    4. Dementia with behavioral disturbance, unspecified dementia type    5. Tremor, essential    6. Recurrent major depressive disorder, in partial remission    7. Hearing difficulty, bilateral    8. Vertigo    9. Gastroesophageal reflux disease, esophagitis presence not specified    10. Low serum vitamin B12    11. Fragile X syndrome        Plan:   Zeina was seen today for follow-up.    Diagnoses and all orders for this visit:    Essential hypertension   Continue lisinopril and start Norvasc 5mg daily.  Instructed to monitor BP at home and bring to next appointment.    -     amlodipine (NORVASC) 5 MG tablet; Take 1 tablet (5 mg total) by mouth once daily.    Hyperlipidemia, unspecified hyperlipidemia type  Type II diabetes mellitus with neurological manifestations  Dementia with behavioral disturbance, unspecified dementia type  Tremor, essential  Recurrent major depressive disorder, in partial remission  Hearing difficulty, bilateral  Vertigo  Gastroesophageal reflux disease, esophagitis presence not specified  Low serum vitamin B12  Fragile X syndrome   Continue medication/plan as discussed in  HPI.Dictation #1  MRN:[id number]  CSN:[id number]      Health maintenance reviewed with patient.     Return in about 2 months (around 11/18/2017) with NP and in January with me.    Shahnaz Gupta MD  Internal Medicine  [facility]

## 2017-09-20 ENCOUNTER — OFFICE VISIT (OUTPATIENT)
Dept: NEUROLOGY | Facility: CLINIC | Age: 82
End: 2017-09-20
Payer: MEDICARE

## 2017-09-20 VITALS
SYSTOLIC BLOOD PRESSURE: 138 MMHG | BODY MASS INDEX: 25.64 KG/M2 | DIASTOLIC BLOOD PRESSURE: 79 MMHG | WEIGHT: 114 LBS | HEART RATE: 59 BPM | HEIGHT: 56 IN

## 2017-09-20 DIAGNOSIS — Q99.2 FRAGILE X ASSOCIATED TREMOR ATAXIA SYNDROME: ICD-10-CM

## 2017-09-20 DIAGNOSIS — G25.0 FRAGILE X ASSOCIATED TREMOR ATAXIA SYNDROME: ICD-10-CM

## 2017-09-20 DIAGNOSIS — G30.1 LATE ONSET ALZHEIMER'S DISEASE WITH BEHAVIORAL DISTURBANCE: Primary | ICD-10-CM

## 2017-09-20 DIAGNOSIS — G11.9 FRAGILE X ASSOCIATED TREMOR ATAXIA SYNDROME: ICD-10-CM

## 2017-09-20 DIAGNOSIS — F02.818 LATE ONSET ALZHEIMER'S DISEASE WITH BEHAVIORAL DISTURBANCE: Primary | ICD-10-CM

## 2017-09-20 PROCEDURE — 1126F AMNT PAIN NOTED NONE PRSNT: CPT | Mod: S$GLB,,, | Performed by: PSYCHIATRY & NEUROLOGY

## 2017-09-20 PROCEDURE — 3008F BODY MASS INDEX DOCD: CPT | Mod: S$GLB,,, | Performed by: PSYCHIATRY & NEUROLOGY

## 2017-09-20 PROCEDURE — 99999 PR PBB SHADOW E&M-EST. PATIENT-LVL III: CPT | Mod: PBBFAC,,, | Performed by: PSYCHIATRY & NEUROLOGY

## 2017-09-20 PROCEDURE — 99204 OFFICE O/P NEW MOD 45 MIN: CPT | Mod: S$GLB,,, | Performed by: PSYCHIATRY & NEUROLOGY

## 2017-09-20 PROCEDURE — 99499 UNLISTED E&M SERVICE: CPT | Mod: S$GLB,,, | Performed by: PSYCHIATRY & NEUROLOGY

## 2017-09-20 PROCEDURE — 1159F MED LIST DOCD IN RCRD: CPT | Mod: S$GLB,,, | Performed by: PSYCHIATRY & NEUROLOGY

## 2017-09-20 RX ORDER — DONEPEZIL HYDROCHLORIDE 5 MG/1
5 TABLET, FILM COATED ORAL NIGHTLY
Qty: 30 TABLET | Refills: 11 | Status: SHIPPED | OUTPATIENT
Start: 2017-09-20 | End: 2018-08-13

## 2017-09-20 NOTE — PATIENT INSTRUCTIONS
1) Stop the nortriptyline (Pamelor).    2) Start Aricept (donepezil) at bedtime, 5 mg only for now.    3) Continue the Seroquel (quetiapine) at current dose.

## 2017-09-20 NOTE — LETTER
September 20, 2017      Alan Kaplan MD  [address]  [facility] [address]           [facility]  [address]  [facility] [address]  Phone: [phone]  Fax: [phone]          Patient: Zeina White   MR Number: [id number]   YOB: 1927   Date of Visit: 9/20/2017       Dear Dr. Alan Kaplan:    Thank you for referring Zeina White to me for evaluation. Attached you will find relevant portions of my assessment and plan of care.    If you have questions, please do not hesitate to call me. I look forward to following Zeina White along with you.    Sincerely,    Mahamed Churchill MD    Enclosure  CC:  No Recipients    If you would like to receive this communication electronically, please contact [email or web address] or [phone] to request more information on [employer] Link access.    For providers and/or their staff who would like to refer a patient to [facility], please contact us through our one-stop-shop provider referral line, [facility], at [phone].    If you feel you have received this communication in error or would no longer like to receive these types of communications, please e-mail [email or web address]

## 2017-09-20 NOTE — PROGRESS NOTES
"Name: Zeina White  MRN: [id number]   CSN: [id number]      Date: 09/20/2017    Referring physician:  Alan Kaplan MD  [address] [place] [address]    Chief Complaint:   - was developing memory impairment of the last 1-2 years, repeating herself  - then broke her hip in March, had hyperactive delirium as a hospitalized patient and at rehab/SNF  - fluctuates dramatically, gets upset, aggravated, and yells  -   -   - all the boys with fragile x at home    History of Present Illness (HPI):  86 yo with tremors x 3-4 years, gradually worsening.   - affects posture and kinesis, handling adls.   - worse handwriting   - mild voice   - no neck that she is aware of.   - balance is ok   - no cognitive changes    - no falls    Never had MRI brain.    Family history of Fragile X syndrome, all boys are affected (3) and 1/4 girls with syndrome and another girl as a "carrier" (presume asymptomatic due to repeat length in 30s?).    ROS:  Review of Systems   Constitutional: Negative for malaise/fatigue and weight loss.   HENT: Negative for hearing loss.    Eyes: Negative for blurred vision and double vision.   Respiratory: Negative for shortness of breath and stridor.    Cardiovascular: Negative for chest pain and palpitations.   Gastrointestinal: Negative for constipation, nausea and vomiting.   Genitourinary: Negative for frequency and urgency.   Musculoskeletal: Negative for falls and myalgias.   Skin: Negative for rash.   Neurological: Positive for tremors. Negative for focal weakness and seizures.   Endo/Heme/Allergies: Does not bruise/bleed easily.   Psychiatric/Behavioral: Negative for depression, hallucinations and memory loss. The patient is not nervous/anxious.            Past Medical History: The patient  has a past medical history of Anxiety; Atypical chest pain; Dementia; Diabetes mellitus; DJD (degenerative joint disease); GERD (gastroesophageal reflux disease); Hyperlipidemia; Hypertension; Irritable bowel; " Tremor, essential; and Vertigo.    Social History: The patient  reports that she quit smoking about 60 years ago. Her smoking use included Cigarettes. She has never used smokeless tobacco. She reports that she does not drink alcohol or use drugs.    Family History: Their family history includes Alcohol abuse in her brother; Cancer in her mother; Diabetes in her father; Parkinsonism in her father.    Allergies: Naproxen     Meds:   Current Outpatient Prescriptions:     acetaminophen (TYLENOL) 500 MG tablet, Take 500 mg by mouth 3 (three) times daily as needed for Pain., Disp: , Rfl:     amlodipine (NORVASC) 5 MG tablet, Take 1 tablet (5 mg total) by mouth once daily., Disp: 30 tablet, Rfl: 11    b complex vitamins tablet, Take 1 tablet by mouth once daily., Disp: , Rfl:     blood sugar diagnostic Strp, 1 strip by Misc.(Non-Drug; Combo Route) route 2 (two) times daily. May have a 30 or 90 day supply, Disp: 100 each, Rfl: 11    docusate sodium (COLACE) 100 MG capsule, Take 100 mg by mouth once daily., Disp: , Rfl:     lisinopril (PRINIVIL,ZESTRIL) 40 MG tablet, TAKE 1 TABLET(40 MG) BY MOUTH EVERY DAY, Disp: 90 tablet, Rfl: 3    meclizine (ANTIVERT) 12.5 mg tablet, Take 1 tablet (12.5 mg total) by mouth 3 (three) times daily as needed for Dizziness., Disp: 20 tablet, Rfl: 0    metformin (GLUCOPHAGE) 500 MG tablet, TK 1 T PO BID WITH MEALS, Disp: 180 tablet, Rfl: 0    multivitamin capsule, Take 1 capsule by mouth once daily., Disp: , Rfl:     nortriptyline (PAMELOR) 10 MG capsule, , Disp: , Rfl: 11    quetiapine (SEROQUEL) 25 MG Tab, Take 1.5 tablets at 3pm and 2.5 tablets every evening., Disp: 120 tablet, Rfl: 3    ranitidine (ZANTAC) 300 MG tablet, Take 1 tablet (300 mg total) by mouth 2 (two) times daily., Disp: 180 tablet, Rfl: 0    simvastatin (ZOCOR) 40 MG tablet, Take 1 tablet (40 mg total) by mouth once daily., Disp: 90 tablet, Rfl: 0    TRUE METRIX GLUCOSE METER Misc, , Disp: , Rfl:     TRUEPLUS  "LANCETS 28 gauge Misc, , Disp: , Rfl:     diazePAM (VALIUM) 2 MG tablet, Take 1 tablet (2 mg total) by mouth as needed for Anxiety (prior to MRI)., Disp: 3 tablet, Rfl: 0    Exam:  /79   Pulse (!) 59   Ht 4' 8" (1.422 m)   Wt 51.7 kg (113 lb 15.7 oz)   BMI 25.55 kg/m²     Constitutional  Well-developed, well-nourished, appears stated age   Cardivascular  Radial pulses 2+ and symmetric, no LE edema bilaterally   Neurological    * Mental status   Says 1972, cannot name the president, severe hearing impairment affects the test.  Gives random numbers for the name of the president and naming my glasses.   * Cranial nerves       - CN II  PERRL, visual fields full to confrontation     - CN III, IV, VI  Extraocular movements full, normal pursuits and saccades     - CN V  Sensation V1 - V3 intact     - CN VII  Face strong and symmetric bilaterally     - CN VIII  Hearing intact to finger rub bilaterally     - CN IX, X  Palate raises midline and symmetric     - CN XI  SCM and trapezius 5/5 bilaterally     - CN XII  Tongue midline   * Motor  Muscle bulk normal, strength 5/5 throughout   * Sensory   Intact to temperature and vibration throughout   * Coordination  No dysmetria with finger-to-nose or heel-to-shin   * Gait  Normal based and steady   * Deep tendon reflexes  2+ and symmetric throughout   Babinski downgoing bilaterally   * Specialized movement exam  Mild tremulous speech.    No facial masking.   Normal tone throughout.     No bradykinesia.   + tremor of hands with posture and kinesis, symmetric, a bit jerky - remains the same.      No other dystonia, chorea, athetosis, myoclonus, or tics.   No motor impersistence.       VIDEOTAPED WITH CONSENT    Laboratory/Radiological:  - Results: Results for CHUY, JUNE T (MRN [id number]) as of 3/12/2013 15:29   Ref. 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  Discussed confirmatory genetic testing of repeat length, but given X-linked and well-described genotypic presentation of family with boys affected, it must be maternally-transmitted.  Repeat-length would still be interesting, but not likely change in her management, unless was quite low..then would consider her tremor disorder to NOT be Fragile X - related.  2) Dementia.  Epi studies of FXTAS usually shows women WITHOUT dementia, so her progressive cognitive change may simply be Alzheimer's type.    Medical decision makin) Stop the nortriptyline (Pamelor).    2) Start Aricept (donepezil) at bedtime, 5 mg only for now.    3) Continue the Seroquel (quetiapine) at current dose.

## 2017-09-25 ENCOUNTER — PATIENT MESSAGE (OUTPATIENT)
Dept: NEUROLOGY | Facility: CLINIC | Age: 82
End: 2017-09-25

## 2017-09-26 ENCOUNTER — TELEPHONE (OUTPATIENT)
Dept: INTERNAL MEDICINE | Facility: CLINIC | Age: 82
End: 2017-09-26

## 2017-09-26 DIAGNOSIS — R82.90 ABNORMAL URINE ODOR: Primary | ICD-10-CM

## 2017-09-26 DIAGNOSIS — R82.998 DARK URINE: ICD-10-CM

## 2017-09-26 NOTE — TELEPHONE ENCOUNTER
Spoke with pt's daughter and she stated that her mom urine has been dark and has a slight odor and would like to have her urine tested to be on the safe side because she hasn't been urinating frequently but before she gets to that point she would to have it tested.   Please advise

## 2017-09-26 NOTE — TELEPHONE ENCOUNTER
Spoke with pt's daughter and she will come to the clinic to  pt's at home urine collect kit and return it once done

## 2017-09-26 NOTE — TELEPHONE ENCOUNTER
Orders for home collect urine are in.  Please call daughter and instruct her on where to  urine cup.

## 2017-09-26 NOTE — TELEPHONE ENCOUNTER
Please call daughter and see what she is needing?  Is she requesting a home collect urinalysis to see if patient has a UTI?  If so, I can put those orders in.

## 2017-09-26 NOTE — TELEPHONE ENCOUNTER
----- Message from Hailey Chaudhary sent at 9/26/2017  8:13 AM CDT -----  Contact: Daughter / Destiny [phone]  Daughter would like to know if she can Order a UTI Kit and  form [facility] on [address]    Daughter can be reached at [phone]

## 2017-09-29 ENCOUNTER — TELEPHONE (OUTPATIENT)
Dept: NEUROLOGY | Facility: CLINIC | Age: 82
End: 2017-09-29

## 2017-09-29 ENCOUNTER — PATIENT MESSAGE (OUTPATIENT)
Dept: NEUROLOGY | Facility: CLINIC | Age: 82
End: 2017-09-29

## 2017-09-29 NOTE — TELEPHONE ENCOUNTER
----- Message from Anshu Raza sent at 9/29/2017  8:31 AM CDT -----  Contact: Destiny (mom) @ [phone]  Destiny states pt had depression this week with anger. Destiny says it's gotten worse since being off of nortriptyline (PAMELOR) 10 MG capsule, but she did increase quetiapine (SEROQUEL) 25 MG Tab--increased to 37.5mg to 50mg. Destiny would like to speak with the doctor asap.

## 2017-10-03 RX ORDER — SERTRALINE HYDROCHLORIDE 25 MG/1
25 TABLET, FILM COATED ORAL DAILY
Qty: 30 TABLET | Refills: 11 | Status: SHIPPED | OUTPATIENT
Start: 2017-10-03 | End: 2018-02-08 | Stop reason: SDUPTHER

## 2017-11-15 ENCOUNTER — PATIENT MESSAGE (OUTPATIENT)
Dept: NEUROLOGY | Facility: CLINIC | Age: 82
End: 2017-11-15

## 2017-11-16 ENCOUNTER — OFFICE VISIT (OUTPATIENT)
Dept: PODIATRY | Facility: CLINIC | Age: 82
End: 2017-11-16
Payer: MEDICARE

## 2017-11-16 VITALS
HEART RATE: 70 BPM | SYSTOLIC BLOOD PRESSURE: 143 MMHG | DIASTOLIC BLOOD PRESSURE: 72 MMHG | WEIGHT: 113 LBS | BODY MASS INDEX: 25.42 KG/M2 | HEIGHT: 56 IN

## 2017-11-16 DIAGNOSIS — E11.49 TYPE II DIABETES MELLITUS WITH NEUROLOGICAL MANIFESTATIONS: Primary | ICD-10-CM

## 2017-11-16 DIAGNOSIS — B35.1 ONYCHOMYCOSIS: ICD-10-CM

## 2017-11-16 DIAGNOSIS — L84 CORN OR CALLUS: ICD-10-CM

## 2017-11-16 PROCEDURE — 99999 PR PBB SHADOW E&M-EST. PATIENT-LVL III: CPT | Mod: PBBFAC,,, | Performed by: PODIATRIST

## 2017-11-16 PROCEDURE — 99499 UNLISTED E&M SERVICE: CPT | Mod: S$GLB,,, | Performed by: PODIATRIST

## 2017-11-16 PROCEDURE — 11721 DEBRIDE NAIL 6 OR MORE: CPT | Mod: 59,Q9,S$GLB, | Performed by: PODIATRIST

## 2017-11-16 PROCEDURE — 11055 PARING/CUTG B9 HYPRKER LES 1: CPT | Mod: Q9,S$GLB,, | Performed by: PODIATRIST

## 2017-11-16 RX ORDER — GLUCOSAM/CHON-MSM1/C/MANG/BOSW 500-416.6
TABLET ORAL
COMMUNITY
Start: 2017-09-11 | End: 2018-02-08 | Stop reason: SDUPTHER

## 2017-11-16 NOTE — PROCEDURES
"Routine Foot Care  Date/Time: 11/16/2017 10:36 AM  Performed by: SMITA MARSHALL  Authorized by: SMITA MARSHALL     Time out: Immediately prior to procedure a "time out" was called to verify the correct patient, procedure, equipment, support staff and site/side marked as required.    Consent Done?:  Yes (Verbal)  Hyperkeratotic Skin Lesions?: Yes    Number of trimmed lesions:  1  Location(s):  Left 3rd Toe    Nail Care Type:  Debride  Location(s): All  (Left 1st Toe, Left 3rd Toe, Left 2nd Toe, Left 4th Toe, Left 5th Toe, Right 1st Toe, Right 2nd Toe, Right 3rd Toe, Right 4th Toe and Right 5th Toe)  Patient tolerance:  Patient tolerated the procedure well with no immediate complications      "

## 2017-11-16 NOTE — PROGRESS NOTES
Subjective:      Patient ID: Zeina White is a 89 y.o. female.    Chief Complaint: Diabetic Foot Exam (Dr. Gupta 9/18/17); Diabetes Mellitus; and Nail Care    Zeina is a 89 y.o. female who presents to the clinic for evaluation and treatment of high risk feet. Zeina has a past medical history of Anxiety; Atypical chest pain; Dementia; Diabetes mellitus; DJD (degenerative joint disease); GERD (gastroesophageal reflux disease); Hyperlipidemia; Hypertension; Irritable bowel; Tremor, essential; and Vertigo. The patient's chief complaint is long, thick toenails. This patient has documented high risk feet requiring routine maintenance secondary to peripheral neuropathy.    PCP: Alan Kaplan MD  Seen by Dr. KAROL Gupta 9/18/17  Date Last Seen by PCP:     Current shoe gear:  Affected Foot: Casual shoes     Unaffected Foot: Casual shoes    Hemoglobin A1C   Date Value Ref Range Status   08/01/2017 7.6 (H) 4.0 - 5.6 % Final     Comment:     According to ADA guidelines, hemoglobin A1c <7.0% represents  optimal control in non-pregnant diabetic patients. Different  metrics may apply to specific patient populations.   Standards of Medical Care in Diabetes-2016.  For the purpose of screening for the presence of diabetes:  <5.7%     Consistent with the absence of diabetes  5.7-6.4%  Consistent with increasing risk for diabetes   (prediabetes)  >or=6.5%  Consistent with diabetes  Currently, no consensus exists for use of hemoglobin A1c  for diagnosis of diabetes for children.  This Hemoglobin A1c assay has significant interference with fetal   hemoglobin   (HbF). The results are invalid for patients with abnormal amounts of   HbF,   including those with known Hereditary Persistence   of Fetal Hemoglobin. Heterozygous hemoglobin variants (HbAS, HbAC,   HbAD, HbAE, HbA2) do not significantly interfere with this assay;   however, presence of multiple variants in a sample may impact the %   interference.     03/21/2017 9.6 (H) 4.5 - 6.2  % Final     Comment:     According to ADA guidelines, hemoglobin A1C <7.0% represents  optimal control in non-pregnant diabetic patients.  Different  metrics may apply to specific populations.   Standards of Medical Care in Diabetes - 2016.  For the purpose of screening for the presence of diabetes:  <5.7%     Consistent with the absence of diabetes  5.7-6.4%  Consistent with increasing risk for diabetes   (prediabetes)  >or=6.5%  Consistent with diabetes  Currently no consensus exists for use of hemoglobin A1C  for diagnosis of diabetes for children.     01/23/2017 10.4 (H) 4.5 - 6.2 % Final     Comment:     According to ADA guidelines, hemoglobin A1C <7.0% represents  optimal control in non-pregnant diabetic patients.  Different  metrics may apply to specific populations.   Standards of Medical Care in Diabetes - 2016.  For the purpose of screening for the presence of diabetes:  <5.7%     Consistent with the absence of diabetes  5.7-6.4%  Consistent with increasing risk for diabetes   (prediabetes)  >or=6.5%  Consistent with diabetes  Currently no consensus exists for use of hemoglobin A1C  for diagnosis of diabetes for children.         ROS        Objective:      Physical Exam   Constitutional: No distress.   Cardiovascular:   Pulses:       Dorsalis pedis pulses are 1+ on the right side, and 1+ on the left side.        Posterior tibial pulses are 1+ on the right side, and 1+ on the left side.   CFT< 3 secs all toes bilateral foot, skin temp warm to cool bilateral foot, no hair growth bilateral lower extremity, mild lower extremity edema with varicosities.     Musculoskeletal:        Right foot: There is decreased range of motion and deformity.        Left foot: There is decreased range of motion and deformity.   Semi-reducible digital contractures toes 2-5 bilateral foot.    No pain with ROM or MMT bilateral foot.   Feet:   Right Foot:   Protective Sensation: 10 sites tested. 5 sites sensed.   Skin Integrity:  Positive for dry skin.   Left Foot:   Protective Sensation: 10 sites tested. 7 sites sensed.   Skin Integrity: Positive for dry skin.   Skin: Skin is warm, dry and intact. Capillary refill takes less than 2 seconds. Lesion noted. No ecchymosis and no rash noted. She is not diaphoretic. No cyanosis or erythema. Nails show no clubbing.   Nails 1-5 bilateral are elongated 2-3 mm, thickened 2-3 mm, yellow-white discoloration with debris and loosening.    No open lesions or macerations bilateral lower extremity.    Hyperkeratotic lesion dorsal right fifth toe.    Skin is thin and atrophied bilateral foot.             Assessment:       Encounter Diagnoses   Name Primary?    Type II diabetes mellitus with neurological manifestations Yes    Corn or callus     Onychomycosis          Plan:       Zeina was seen today for diabetic foot exam, diabetes mellitus and nail care.    Diagnoses and all orders for this visit:    Type II diabetes mellitus with neurological manifestations    Corn or callus    Onychomycosis      I counseled the patient on her conditions, their implications and medical management.    Shoe inspection. Diabetic Foot Education. Patient reminded of the importance of good nutrition and blood sugar control to help prevent podiatric complications of diabetes. Patient instructed on proper foot hygeine. We discussed wearing proper shoe gear, daily foot inspections, never walking without protective shoe gear, never putting sharp instruments to feet, routine podiatric nail visits every 2-3 months.      With patient's verbal consent, nails were aggressively reduced and debrided x 10 to their soft tissue attachment mechanically and with electric , removing all offending nail and debris. Patient relates relief following the procedure. No anesthesia or hemostasis required. No blood loss.     With patient's verbal consent the hyperkeratotic lesion on right fifth toe was trimmed to healthy appearing skin using a  sterile #15 scalpel. No anesthesia required. No blood loss. She tolerated the procedure well without complications.    RTC 3 months or prn.

## 2017-12-01 RX ORDER — METFORMIN HYDROCHLORIDE 500 MG/1
TABLET ORAL
Qty: 180 TABLET | Refills: 0 | Status: SHIPPED | OUTPATIENT
Start: 2017-12-01 | End: 2018-08-30

## 2017-12-04 ENCOUNTER — PATIENT MESSAGE (OUTPATIENT)
Dept: ORTHOPEDICS | Facility: CLINIC | Age: 82
End: 2017-12-04

## 2017-12-12 ENCOUNTER — HOSPITAL ENCOUNTER (OUTPATIENT)
Dept: RADIOLOGY | Facility: HOSPITAL | Age: 82
Discharge: HOME | End: 2017-12-12
Attending: PHYSICIAN ASSISTANT
Payer: MEDICARE

## 2017-12-12 ENCOUNTER — OFFICE VISIT (OUTPATIENT)
Dept: ORTHOPEDICS | Facility: CLINIC | Age: 82
End: 2017-12-12
Payer: MEDICARE

## 2017-12-12 DIAGNOSIS — M25.561 RIGHT KNEE PAIN, UNSPECIFIED CHRONICITY: ICD-10-CM

## 2017-12-12 DIAGNOSIS — M25.561 RIGHT KNEE PAIN, UNSPECIFIED CHRONICITY: Primary | ICD-10-CM

## 2017-12-12 DIAGNOSIS — R29.898 LEG WEAKNESS, BILATERAL: ICD-10-CM

## 2017-12-12 PROCEDURE — 99213 OFFICE O/P EST LOW 20 MIN: CPT | Mod: S$GLB,,, | Performed by: PHYSICIAN ASSISTANT

## 2017-12-12 PROCEDURE — 99999 PR PBB SHADOW E&M-EST. PATIENT-LVL III: CPT | Mod: PBBFAC,,, | Performed by: PHYSICIAN ASSISTANT

## 2017-12-12 PROCEDURE — 73560 X-RAY EXAM OF KNEE 1 OR 2: CPT | Mod: 26,59,LT, | Performed by: RADIOLOGY

## 2017-12-12 PROCEDURE — 73562 X-RAY EXAM OF KNEE 3: CPT | Mod: 26,RT,, | Performed by: RADIOLOGY

## 2017-12-12 PROCEDURE — 73560 X-RAY EXAM OF KNEE 1 OR 2: CPT | Mod: TC,LT

## 2017-12-13 NOTE — PROGRESS NOTES
Subjective:      Patient ID: Zeina White is a 89 y.o. female.    Chief Complaint: No chief complaint on file.    HPI    Patient is a 89 year old female who presents to clinic with chief complaint of right knee pain. Patient stated that she will get intermittent right knee pain. Pain is increased with increased activity. Patient has tried rest ice Advil and icy hot which helps. She reports popping and cracking.     Review of Systems   Constitution: Negative for chills and fever.   Cardiovascular: Negative for chest pain.   Respiratory: Negative for cough and shortness of breath.    Skin: Negative for color change, dry skin, itching, nail changes, poor wound healing and rash.   Musculoskeletal:        Right knee pain   Neurological: Negative for dizziness.   Psychiatric/Behavioral: Negative for altered mental status. The patient is not nervous/anxious.    All other systems reviewed and are negative.        Objective:        General    Constitutional: She is oriented to person, place, and time. She appears well-developed and well-nourished. No distress.   HENT:   Head: Atraumatic.   Eyes: Conjunctivae are normal.   Cardiovascular: Normal rate.    Pulmonary/Chest: Effort normal.   Neurological: She is alert and oriented to person, place, and time.   Psychiatric: She has a normal mood and affect. Her behavior is normal.           Right Knee Exam     Inspection   Swelling: absent  Bruising: absent    Tenderness   The patient is tender to palpation of the medial joint line.    Range of Motion   The patient has normal right knee ROM.    Tests   Meniscus   Keesha:  Medial - negative Lateral - negative  Ligament Examination Lachman: normal (-1 to 2mm) PCL-Posterior Drawer: normal (0 to 2mm)     MCL - Valgus: normal (0 to 2mm)  LCL - Varus: normal    Other   Sensation: normal    Muscle Strength   Right Lower Extremity   Quadriceps:  4/5 and 3/5   Hamstrin/5 and 3/5       RADS: no fracture or dislocation,  Left knee  arthroplasty. Right knee DJD..        Assessment:       Encounter Diagnoses   Name Primary?    Right knee pain, unspecified chronicity Yes    Leg weakness, bilateral           Plan:       Discussed plan with patient and caregiver. Continue rest. Ice, icy hot, Advil as needed and order for PT placed.  Patient is to make appointment herself for PT. She is to return to clinic as needed.

## 2017-12-20 ENCOUNTER — OFFICE VISIT (OUTPATIENT)
Dept: NEUROLOGY | Facility: CLINIC | Age: 82
End: 2017-12-20
Payer: MEDICARE

## 2017-12-20 VITALS
SYSTOLIC BLOOD PRESSURE: 154 MMHG | HEIGHT: 56 IN | BODY MASS INDEX: 27.73 KG/M2 | HEART RATE: 68 BPM | DIASTOLIC BLOOD PRESSURE: 76 MMHG | WEIGHT: 123.25 LBS

## 2017-12-20 DIAGNOSIS — F32.A DEPRESSION, UNSPECIFIED DEPRESSION TYPE: ICD-10-CM

## 2017-12-20 DIAGNOSIS — Q99.2 FRAGILE X ASSOCIATED TREMOR ATAXIA SYNDROME: Primary | ICD-10-CM

## 2017-12-20 DIAGNOSIS — G25.0 FRAGILE X ASSOCIATED TREMOR ATAXIA SYNDROME: Primary | ICD-10-CM

## 2017-12-20 DIAGNOSIS — G11.9 FRAGILE X ASSOCIATED TREMOR ATAXIA SYNDROME: Primary | ICD-10-CM

## 2017-12-20 DIAGNOSIS — R41.3 MEMORY LOSS: ICD-10-CM

## 2017-12-20 PROCEDURE — 99214 OFFICE O/P EST MOD 30 MIN: CPT | Mod: S$GLB,,, | Performed by: PSYCHIATRY & NEUROLOGY

## 2017-12-20 PROCEDURE — 99499 UNLISTED E&M SERVICE: CPT | Mod: S$GLB,,, | Performed by: PSYCHIATRY & NEUROLOGY

## 2017-12-20 PROCEDURE — 99999 PR PBB SHADOW E&M-EST. PATIENT-LVL III: CPT | Mod: PBBFAC,,, | Performed by: PSYCHIATRY & NEUROLOGY

## 2017-12-20 NOTE — PROGRESS NOTES
"Name: Zeina White  MRN: [id number]   CSN: [id number]      Date: 12/20/2017    Referring physician:  Shahnaz Gupta MD  [address] [place] [address]    Chief Complaint:   - fewer mood swings, easier going, not complaining on the Zoloft  - says she is happy  - rarely gets mad or sad  - says her legs are stiff and weaker  - has 24 hour sitters for her two boys and one daughter       From September 2017:  - was developing memory impairment of the last 1-2 years, repeating herself  - then broke her hip in March, had hyperactive delirium as a hospitalized patient and at rehab/SNF  - fluctuates dramatically, gets upset, aggravated, and yells  - all the boys with fragile x at home    History of Present Illness (HPI):  86 yo with tremors x 3-4 years, gradually worsening.   - affects posture and kinesis, handling adls.   - worse handwriting   - mild voice   - no neck that she is aware of.   - balance is ok   - no cognitive changes    - no falls    Never had MRI brain.    Family history of Fragile X syndrome, all boys are affected (3) and 1/4 girls with syndrome and another girl as a "carrier" (presume asymptomatic due to repeat length in 30s?).    ROS:  Review of Systems   Constitutional: Negative for malaise/fatigue and weight loss.   HENT: Negative for hearing loss.    Eyes: Negative for blurred vision and double vision.   Respiratory: Negative for shortness of breath and stridor.    Cardiovascular: Negative for chest pain and palpitations.   Gastrointestinal: Negative for constipation, nausea and vomiting.   Genitourinary: Negative for frequency and urgency.   Musculoskeletal: Negative for falls and myalgias.   Skin: Negative for rash.   Neurological: Positive for tremors. Negative for focal weakness and seizures.   Endo/Heme/Allergies: Does not bruise/bleed easily.   Psychiatric/Behavioral: Negative for depression, hallucinations and memory loss. The patient is not nervous/anxious.            Past " Medical History: The patient  has a past medical history of Anxiety; Atypical chest pain; Dementia; Diabetes mellitus; DJD (degenerative joint disease); GERD (gastroesophageal reflux disease); Hyperlipidemia; Hypertension; Irritable bowel; Tremor, essential; and Vertigo.    Social History: The patient  reports that she quit smoking about 60 years ago. Her smoking use included Cigarettes. She has never used smokeless tobacco. She reports that she does not drink alcohol or use drugs.    Family History: Their family history includes Alcohol abuse in her brother; Cancer in her mother; Diabetes in her father; Parkinsonism in her father.    Allergies: Naproxen     Meds:   Current Outpatient Prescriptions:     acetaminophen (TYLENOL) 500 MG tablet, Take 500 mg by mouth 3 (three) times daily as needed for Pain., Disp: , Rfl:     amlodipine (NORVASC) 5 MG tablet, Take 1 tablet (5 mg total) by mouth once daily., Disp: 30 tablet, Rfl: 11    b complex vitamins tablet, Take 1 tablet by mouth once daily., Disp: , Rfl:     blood sugar diagnostic Strp, 1 strip by Misc.(Non-Drug; Combo Route) route 2 (two) times daily. May have a 30 or 90 day supply, Disp: 100 each, Rfl: 11    docusate sodium (COLACE) 100 MG capsule, Take 100 mg by mouth once daily., Disp: , Rfl:     lisinopril (PRINIVIL,ZESTRIL) 40 MG tablet, TAKE 1 TABLET(40 MG) BY MOUTH EVERY DAY, Disp: 90 tablet, Rfl: 3    meclizine (ANTIVERT) 12.5 mg tablet, Take 1 tablet (12.5 mg total) by mouth 3 (three) times daily as needed for Dizziness., Disp: 20 tablet, Rfl: 0    metFORMIN (GLUCOPHAGE) 500 MG tablet, TAKE 1 TABLET BY MOUTH TWICE DAILY WITH MEALS, Disp: 180 tablet, Rfl: 0    multivitamin capsule, Take 1 capsule by mouth once daily., Disp: , Rfl:     quetiapine (SEROQUEL) 25 MG Tab, Take 1.5 tablets at 3pm and 2.5 tablets every evening., Disp: 120 tablet, Rfl: 3    ranitidine (ZANTAC) 300 MG tablet, TAKE 1 TABLET(300 MG) BY MOUTH TWICE DAILY, Disp: 180 tablet,  "Rfl: 0    sertraline (ZOLOFT) 25 MG tablet, Take 1 tablet (25 mg total) by mouth once daily., Disp: 30 tablet, Rfl: 11    simvastatin (ZOCOR) 40 MG tablet, Take 1 tablet (40 mg total) by mouth once daily., Disp: 90 tablet, Rfl: 0    TRUE METRIX GLUCOSE METER Misc, , Disp: , Rfl:     TRUEPLUS LANCETS 28 gauge Misc, , Disp: , Rfl:     TRUEPLUS LANCETS 30 gauge Misc, , Disp: , Rfl:     donepezil (ARICEPT) 5 MG tablet, Take 1 tablet (5 mg total) by mouth every evening., Disp: 30 tablet, Rfl: 11    nortriptyline (PAMELOR) 10 MG capsule, , Disp: , Rfl: 11    Exam:  BP (!) 154/76   Pulse 68   Ht 4' 8" (1.422 m)   Wt 55.9 kg (123 lb 3.8 oz)   BMI 27.63 kg/m²     Constitutional  Well-developed, well-nourished, appears stated age   Cardivascular  Radial pulses 2+ and symmetric, no LE edema bilaterally   Neurological    * Mental status   Says 1972, cannot name the president, severe hearing impairment affects the test.  Gives random numbers for the name of the president and naming my glasses.   * Cranial nerves       - CN II  PERRL, visual fields full to confrontation     - CN III, IV, VI  Extraocular movements full, normal pursuits and saccades     - CN V  Sensation V1 - V3 intact     - CN VII  Face strong and symmetric bilaterally     - CN VIII  Hearing intact to finger rub bilaterally     - CN IX, X  Palate raises midline and symmetric     - CN XI  SCM and trapezius 5/5 bilaterally     - CN XII  Tongue midline   * Motor  Muscle bulk normal, strength 5/5 throughout   * Sensory   Intact to temperature and vibration throughout   * Coordination  No dysmetria with finger-to-nose or heel-to-shin   * Gait  Normal based and steady   * Deep tendon reflexes  2+ and symmetric throughout   Babinski downgoing bilaterally   * Specialized movement exam  Mild tremulous speech.    No facial masking.   Normal tone throughout.     No bradykinesia.   + tremor of hands with posture and kinesis, symmetric, a bit jerky - remains the " same.      No other dystonia, chorea, athetosis, myoclonus, or tics.   No motor impersistence.       VIDEOTAPED WITH CONSENT    Laboratory/Radiological:  - Results: Results for PHYLICIA VERA (MRN [id number]) as of 3/12/2013 15:29   Ref. 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  Discussed confirmatory genetic testing of repeat length, but given X-linked and well-described genotypic presentation of family with boys affected, it must be maternally-transmitted.  Repeat-length would still be interesting, but not likely change in her management, unless was quite low..then would consider her tremor disorder to NOT be Fragile X - related.  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  Epi studies of FXTAS usually shows women WITHOUT dementia.    Medical decision making:  - continue the Zoloft at 25, may increase to 50 as needed  - stopped the aricept before  - exercise         Mahamed Churchill MD, MPH  Division of Movement and Memory Disorders  [facility]

## 2017-12-20 NOTE — LETTER
December 26, 2017      Shahnaz Gupta MD  [address] [facility] [address]           [facility] Neurology  [address]  [place] [address]  Phone: [phone]  Fax: [phone]          Patient: Zeina White   MR Number: [id number]   YOB: 1927   Date of Visit: 12/20/2017       Dear Dr. Shahnaz Gupta:    Thank you for referring Zeina White to me for evaluation. Attached you will find relevant portions of my assessment and plan of care.    If you have questions, please do not hesitate to call me. I look forward to following Zeina White along with you.    Sincerely,    Mahamed Churchill MD    Enclosure  CC:  No Recipients    If you would like to receive this communication electronically, please contact [email or web address] or [phone] to request more information on [employer] access.    For providers and/or their staff who would like to refer a patient to [facility], please contact us through our one-stop-shop provider referral line, [facility], at [phone].    If you feel you have received this communication in error or would no longer like to receive these types of communications, please e-mail [email or web address]

## 2018-01-02 DIAGNOSIS — F03.91 DEMENTIA WITH BEHAVIORAL DISTURBANCE, UNSPECIFIED DEMENTIA TYPE: ICD-10-CM

## 2018-01-02 RX ORDER — QUETIAPINE FUMARATE 25 MG/1
TABLET, FILM COATED ORAL
Qty: 120 TABLET | Refills: 0 | Status: SHIPPED | OUTPATIENT
Start: 2018-01-02 | End: 2018-02-12 | Stop reason: SDUPTHER

## 2018-01-17 ENCOUNTER — PATIENT MESSAGE (OUTPATIENT)
Dept: PODIATRY | Facility: CLINIC | Age: 83
End: 2018-01-17

## 2018-01-18 NOTE — PT/OT/SLP PROGRESS
Occupational Therapy   Treatment    Name: Zeina White  MRN: [id number]  Admitting Diagnosis:  Closed basicervical fracture of left femur      Recommendations:     Discharge Recommendations: nursing facility, skilled  Discharge Equipment Recommendations:  walker, standard, bedside commode, bath bench  Barriers to discharge:  Inaccessible home environment, Decreased caregiver support    Subjective   Pt agreeable to treatment    Communicated with: RN prior to session.  Pain/Comfort:  · Pain Rating 1:  (did not rate)  · Location - Side 1: Left  · Location 1: leg  · Pain Addressed 1: Pre-medicate for activity, Reposition, Distraction    Objective:     Patient found with: telemetry    General Precautions: Standard, fall, vision impaired, hearing impaired   Orthopedic Precautions:LLE weight bearing as tolerated     Occupational Performance:    Bed Mobility:    · Patient completed Scooting/Bridging with minimum assistance  · Patient completed Supine to Sit with minimum assistance     Functional Mobility/Transfers:  · Patient completed Sit <> Stand Transfer with minimum assistance  with  standard walker   · Patient completed Bed <> Chair Transfer using Stand Pivot technique with moderate assistance with standard walker   · Ambulated 10 feet with ModA with SW    Activities of Daily Living:  · Grooming: stand by assistance EOB  · UB Dressing: minimum assistance    · LB Dressing: maximal assistance      Patient left up in chair with all lines intact, call button in reach and RN notified    AMPA 6 Click:  AMPAC Total Score: 15  Education:    Assessment:     Zeina White is a 89 y.o. female with a medical diagnosis of Closed basicervical fracture of left femur.  She  tolerated session well and would continue to benefit from OT services in the SNF setting to maximize functional (I) and safety.      Performance deficits affecting function are weakness, impaired endurance, gait instability, impaired functional mobilty,  impaired balance, impaired self care skills, decreased lower extremity function, decreased safety awareness, pain, decreased ROM, impaired skin, edema, orthopedic precautions.      Rehab Prognosis:  good; patient would benefit from acute skilled OT services to address these deficits and reach maximum level of function.       Plan:     · D/C acute OT 2* d/c from [facility]  · Plan of Care Reviewed with: patient, daughter    This Plan of care has been discussed with the patient who was involved in its development and understands and is in agreement with the identified goals and treatment plan    GOALS:    Occupational Therapy Goals     Not on file          Multidisciplinary Problems (Resolved)        Problem: Occupational Therapy Goal    Goal Priority Disciplines Outcome Interventions   Occupational Therapy Goal   (Resolved)     OT, PT/OT Outcome(s) achieved    Description:  Goals to be met by: 04/10     Patient will increase functional independence with ADLs by performing:    UE Dressing with Stand-by Assistance.  LE Dressing with Moderate Assistance and Assistive Devices as needed.  Grooming while seated with Set-up Assistance.  Toileting from bedside commode with Moderate Assistance for hygiene and clothing management.   Stand pivot transfers with CGA.  Toilet transfer to bedside commode with CGA.                      Time Tracking:     OT Date of Treatment: 03/24/17  OT Start Time: 1000  OT Stop Time: 1023  OT Total Time (min): 23 min    Billable Minutes:Self Care/Home Management 13  Therapeutic Activity 10    NATHALIA Doyle  1/18/2018

## 2018-02-08 ENCOUNTER — OFFICE VISIT (OUTPATIENT)
Dept: INTERNAL MEDICINE | Facility: CLINIC | Age: 83
End: 2018-02-08
Payer: MEDICARE

## 2018-02-08 ENCOUNTER — LAB VISIT (OUTPATIENT)
Dept: LAB | Facility: HOSPITAL | Age: 83
End: 2018-02-08
Attending: INTERNAL MEDICINE
Payer: MEDICARE

## 2018-02-08 VITALS
DIASTOLIC BLOOD PRESSURE: 62 MMHG | BODY MASS INDEX: 28.27 KG/M2 | SYSTOLIC BLOOD PRESSURE: 148 MMHG | WEIGHT: 125.69 LBS | HEART RATE: 66 BPM | HEIGHT: 56 IN

## 2018-02-08 DIAGNOSIS — J32.9 CHRONIC SINUSITIS, UNSPECIFIED LOCATION: ICD-10-CM

## 2018-02-08 DIAGNOSIS — K21.9 GASTROESOPHAGEAL REFLUX DISEASE, ESOPHAGITIS PRESENCE NOT SPECIFIED: ICD-10-CM

## 2018-02-08 DIAGNOSIS — I10 ESSENTIAL HYPERTENSION: ICD-10-CM

## 2018-02-08 DIAGNOSIS — M81.0 OSTEOPOROSIS, UNSPECIFIED OSTEOPOROSIS TYPE, UNSPECIFIED PATHOLOGICAL FRACTURE PRESENCE: ICD-10-CM

## 2018-02-08 DIAGNOSIS — Q99.2 FRAGILE X SYNDROME: ICD-10-CM

## 2018-02-08 DIAGNOSIS — F03.91 DEMENTIA WITH BEHAVIORAL DISTURBANCE, UNSPECIFIED DEMENTIA TYPE: Primary | ICD-10-CM

## 2018-02-08 DIAGNOSIS — D51.9 ANEMIA DUE TO VITAMIN B12 DEFICIENCY, UNSPECIFIED B12 DEFICIENCY TYPE: ICD-10-CM

## 2018-02-08 DIAGNOSIS — E78.5 HYPERLIPIDEMIA, UNSPECIFIED HYPERLIPIDEMIA TYPE: ICD-10-CM

## 2018-02-08 DIAGNOSIS — E53.8 LOW SERUM VITAMIN B12: ICD-10-CM

## 2018-02-08 DIAGNOSIS — E11.49 TYPE II DIABETES MELLITUS WITH NEUROLOGICAL MANIFESTATIONS: ICD-10-CM

## 2018-02-08 DIAGNOSIS — M17.0 PRIMARY OSTEOARTHRITIS OF BOTH KNEES: ICD-10-CM

## 2018-02-08 LAB
ALBUMIN SERPL BCP-MCNC: 3.9 G/DL
ALP SERPL-CCNC: 74 U/L
ALT SERPL W/O P-5'-P-CCNC: 23 U/L
ANION GAP SERPL CALC-SCNC: 10 MMOL/L
AST SERPL-CCNC: 22 U/L
BASOPHILS # BLD AUTO: 0.05 K/UL
BASOPHILS NFR BLD: 0.6 %
BILIRUB SERPL-MCNC: 0.3 MG/DL
BUN SERPL-MCNC: 13 MG/DL
CALCIUM SERPL-MCNC: 9.7 MG/DL
CHLORIDE SERPL-SCNC: 104 MMOL/L
CO2 SERPL-SCNC: 30 MMOL/L
CREAT SERPL-MCNC: 0.7 MG/DL
DIFFERENTIAL METHOD: NORMAL
EOSINOPHIL # BLD AUTO: 0.3 K/UL
EOSINOPHIL NFR BLD: 3.8 %
ERYTHROCYTE [DISTWIDTH] IN BLOOD BY AUTOMATED COUNT: 14 %
EST. GFR  (AFRICAN AMERICAN): >60 ML/MIN/1.73 M^2
EST. GFR  (NON AFRICAN AMERICAN): >60 ML/MIN/1.73 M^2
ESTIMATED AVG GLUCOSE: 146 MG/DL
GLUCOSE SERPL-MCNC: 106 MG/DL
HBA1C MFR BLD HPLC: 6.7 %
HCT VFR BLD AUTO: 40.5 %
HGB BLD-MCNC: 13.2 G/DL
LYMPHOCYTES # BLD AUTO: 2 K/UL
LYMPHOCYTES NFR BLD: 24.4 %
MCH RBC QN AUTO: 29.6 PG
MCHC RBC AUTO-ENTMCNC: 32.6 G/DL
MCV RBC AUTO: 91 FL
MONOCYTES # BLD AUTO: 0.6 K/UL
MONOCYTES NFR BLD: 8 %
NEUTROPHILS # BLD AUTO: 5 K/UL
NEUTROPHILS NFR BLD: 62.8 %
PLATELET # BLD AUTO: 230 K/UL
PMV BLD AUTO: 11.1 FL
POTASSIUM SERPL-SCNC: 4.6 MMOL/L
PROT SERPL-MCNC: 6.9 G/DL
RBC # BLD AUTO: 4.46 M/UL
SODIUM SERPL-SCNC: 144 MMOL/L
VIT B12 SERPL-MCNC: 651 PG/ML
WBC # BLD AUTO: 7.98 K/UL

## 2018-02-08 PROCEDURE — 36415 COLL VENOUS BLD VENIPUNCTURE: CPT

## 2018-02-08 PROCEDURE — 85025 COMPLETE CBC W/AUTO DIFF WBC: CPT

## 2018-02-08 PROCEDURE — 83036 HEMOGLOBIN GLYCOSYLATED A1C: CPT

## 2018-02-08 PROCEDURE — 82607 VITAMIN B-12: CPT

## 2018-02-08 PROCEDURE — 80053 COMPREHEN METABOLIC PANEL: CPT

## 2018-02-08 PROCEDURE — 99499 UNLISTED E&M SERVICE: CPT | Mod: S$GLB,,, | Performed by: INTERNAL MEDICINE

## 2018-02-08 PROCEDURE — 99213 OFFICE O/P EST LOW 20 MIN: CPT | Performed by: INTERNAL MEDICINE

## 2018-02-08 PROCEDURE — 99214 OFFICE O/P EST MOD 30 MIN: CPT | Mod: S$GLB,,, | Performed by: INTERNAL MEDICINE

## 2018-02-08 PROCEDURE — 99999 PR PBB SHADOW E&M-EST. PATIENT-LVL III: CPT | Mod: PBBFAC,,, | Performed by: INTERNAL MEDICINE

## 2018-02-08 PROCEDURE — 1126F AMNT PAIN NOTED NONE PRSNT: CPT | Mod: S$GLB,,, | Performed by: INTERNAL MEDICINE

## 2018-02-08 PROCEDURE — 1159F MED LIST DOCD IN RCRD: CPT | Mod: S$GLB,,, | Performed by: INTERNAL MEDICINE

## 2018-02-08 PROCEDURE — 3008F BODY MASS INDEX DOCD: CPT | Mod: S$GLB,,, | Performed by: INTERNAL MEDICINE

## 2018-02-08 RX ORDER — DICLOFENAC SODIUM 10 MG/G
2 GEL TOPICAL DAILY
Qty: 100 G | Refills: 3 | Status: SHIPPED | OUTPATIENT
Start: 2018-02-08 | End: 2018-12-10 | Stop reason: SDUPTHER

## 2018-02-08 RX ORDER — AMLODIPINE BESYLATE 5 MG/1
5 TABLET ORAL DAILY
Qty: 90 TABLET | Refills: 3 | Status: SHIPPED | OUTPATIENT
Start: 2018-02-08 | End: 2018-08-24

## 2018-02-08 RX ORDER — SERTRALINE HYDROCHLORIDE 25 MG/1
25 TABLET, FILM COATED ORAL DAILY
Qty: 90 TABLET | Refills: 3 | Status: SHIPPED | OUTPATIENT
Start: 2018-02-08 | End: 2019-02-11 | Stop reason: SDUPTHER

## 2018-02-08 NOTE — PATIENT INSTRUCTIONS
Tylenol (acetaminophen - do not take more than 3000mg per day, 6 extra strength)    Allergies:  1. Flonase (fluticasone) nasal spray, 2. Zyrtec (cetirizine) or Claritin (loratadine)

## 2018-02-08 NOTE — PROGRESS NOTES
Internal Medicine    Subjective:      Patient ID: Zeina White is a 89 y.o. female.    Chief Complaint: Follow-up (essential hypertension)    Presents for follow up appointment accompanied by daughter.  Last seen 9/18/17.      Dementia with behavioral disturbance, depression:  Unclear etiology of dementia.  Had some memory loss prior to hip fracture, but memory and behavior acutely worsened after hip fracture.  Currently patient has 24/7 sitters.  Saw Neurology (Dr. Churchill) on 12/20/17 at which time he stopped nortriptyline, started Aricept 5mg qHS, and continued Seroquel.  Patient became more depressed after stopping nortriptyline, so started Zoloft 25mg daily on 10/3/17.  Saw him again 12/20/17 and was doing well.  Currently taking Zoloft 25mg daily and Seroquel 50mg at 3pm + 62.5mg at 6pm.  Daughter states patient is doing very well.      Pressure in ears, congestion, runny nose:  Symptoms come and go.  Daughter says Tylenol cold does improve symptoms for a short period of time.  Has never seen ENT.       HTN:  Norvasc 5mg daily started at last appointment.  Continued on lisinopril 40mg daily.  Recommended checking BP daily and bringing log.  Sitters have not been checking BP recently but they plan to start.       HLD:  Taking simvistatin 40mg daily.  Denies side effects.     DM2:  Taking metformin 500mg BID.  Denies side effects.  Last HgA1c 7.6 on 8/1/17.      GERD:  Takes Zantac 300mg BID.       H/o vertigo:  No symptoms currently.  Takes meclizine PRN (very rarely).     Osteoporosis:  Last DEXA in 2009 showing osteoporosis of hip, recommendation was repeat in 2 years.  Daughter unaware that patient ever had osteoporosis - says she was never treated.  Patient has had hip fracture since then.  Declining repeat DEXA.  Daughter does not believe patient could sit up long enough to take bisphosphonate.  Discussed option of intravenous.  Declining treatment or Endocrine referral at this time.       Vit B12:   "Borderline low at 221 on 8/1/17.  Taking B12 1000mcg daily.    Bilateral knee OA (R>L):  Saw Ortho (Dr. Villasenor) on 12/12/17 - encouraged PT and Advil PRN.  Patient currently taking Tylenol as needed (usually 2 extra strength tablets).  Patient says this is mildly helpful.     Macular degeneration:  Sees Ophthalmology.  Taking Preservision.      Fragile X Syndrome.         Review of Systems   Constitutional: Negative for appetite change, chills, fatigue, fever and unexpected weight change.   HENT: Negative for sore throat and trouble swallowing.         Pressure in ears, congestion.   Eyes: Negative for visual disturbance.   Respiratory: Negative for cough, chest tightness, shortness of breath and wheezing.    Cardiovascular: Negative for chest pain, palpitations and leg swelling.   Gastrointestinal: Negative for abdominal pain, blood in stool, constipation, diarrhea, nausea and vomiting.   Genitourinary: Negative for difficulty urinating.   Musculoskeletal: Positive for arthralgias. Negative for myalgias.   Skin: Negative for rash and wound.   Neurological: Negative for dizziness, weakness, numbness and headaches.   Psychiatric/Behavioral: Negative for behavioral problems and confusion.       Past medical history, surgical history, and family medical history reviewed and updated as appropriate.    Medications and allergies reviewed.     Objective:     Vitals:    02/08/18 1041   BP: (!) 148/62   Pulse: 66   Weight: 57 kg (125 lb 10.6 oz)   Height: 4' 8" (1.422 m)     Physical Exam    RESULTS:   Lab Results   Component Value Date    WBC 7.45 08/01/2017    HGB 13.3 08/01/2017    HCT 40.6 08/01/2017    MCV 89 08/01/2017     08/01/2017     BMP  Lab Results   Component Value Date     08/01/2017    K 4.0 08/01/2017     08/01/2017    CO2 32 (H) 08/01/2017    BUN 12 08/01/2017    CREATININE 0.7 08/01/2017    CALCIUM 9.0 08/01/2017    ANIONGAP 8 08/01/2017    ESTGFRAFRICA >60.0 08/01/2017    EGFRNONAA >60.0 " 08/01/2017     Lab Results   Component Value Date    ALT 19 08/01/2017    AST 16 08/01/2017    ALKPHOS 83 08/01/2017    BILITOT 0.3 08/01/2017     Lab Results   Component Value Date    TSH 1.213 08/01/2017     Lab Results   Component Value Date    HGBA1C 7.6 (H) 08/01/2017         Assessment:     1. Dementia with behavioral disturbance, unspecified dementia type    2. Essential hypertension    3. Hyperlipidemia, unspecified hyperlipidemia type    4. Type II diabetes mellitus with neurological manifestations    5. Gastroesophageal reflux disease, esophagitis presence not specified    6. Osteoporosis, unspecified osteoporosis type, unspecified pathological fracture presence    7. Fragile X syndrome    8. Low serum vitamin B12    9. Primary osteoarthritis of both knees    10. Chronic sinusitis, unspecified location    11. Anemia due to vitamin B12 deficiency, unspecified B12 deficiency type         Plan:   Zeina was seen today for follow-up.    Diagnoses and all orders for this visit:    *Continue medication/plan as discussed in HPI except for changes discussed below.    Dementia with behavioral disturbance, unspecified dementia type  -     sertraline (ZOLOFT) 25 MG tablet; Take 1 tablet (25 mg total) by mouth once daily.    Essential hypertension   Instructed patient to check blood pressure daily and bring blood pressure log and blood pressure cuff to next appointment.  Has high systolic but low diastolic so want to be careful with any medication increase.    -     amLODIPine (NORVASC) 5 MG tablet; Take 1 tablet (5 mg total) by mouth once daily.  -     Comprehensive metabolic panel; Future; Expected date: 02/08/2018    Hyperlipidemia, unspecified hyperlipidemia type  -     Comprehensive metabolic panel; Future; Expected date: 02/08/2018    Type II diabetes mellitus with neurological manifestations  -     Hemoglobin A1c; Future; Expected date: 02/08/2018    Gastroesophageal reflux disease, esophagitis presence not  specified    Osteoporosis, unspecified osteoporosis type, unspecified pathological fracture presence   Declining treatment and/or Endocrine referral.    Fragile X syndrome    Low serum vitamin B12  -     CBC auto differential; Future; Expected date: 02/08/2018  -     Vitamin B12; Future; Expected date: 02/08/2018    Primary osteoarthritis of both knees  -     diclofenac sodium (VOLTAREN) 1 % Gel; Apply 2 g topically once daily.    Chronic sinusitis, unspecified location   Discussed trying Zyrtec and/or Flonase.  If this is not helpful, daughter will let me know and we will refer to ENT.    Anemia due to vitamin B12 deficiency, unspecified B12 deficiency type   -     Vitamin B12; Future; Expected date: 02/08/2018    Health maintenance reviewed with patient.     Follow-up in about 3 months (around 5/8/2018).    Shahnaz Gupta MD  Internal Medicine  [facility]

## 2018-02-12 ENCOUNTER — PATIENT MESSAGE (OUTPATIENT)
Dept: INTERNAL MEDICINE | Facility: CLINIC | Age: 83
End: 2018-02-12

## 2018-02-12 DIAGNOSIS — F03.91 DEMENTIA WITH BEHAVIORAL DISTURBANCE, UNSPECIFIED DEMENTIA TYPE: ICD-10-CM

## 2018-02-14 RX ORDER — QUETIAPINE FUMARATE 25 MG/1
TABLET, FILM COATED ORAL
Qty: 405 TABLET | Refills: 1 | Status: SHIPPED | OUTPATIENT
Start: 2018-02-14 | End: 2018-08-13 | Stop reason: SDUPTHER

## 2018-02-26 ENCOUNTER — OFFICE VISIT (OUTPATIENT)
Dept: PODIATRY | Facility: CLINIC | Age: 83
End: 2018-02-26
Payer: MEDICARE

## 2018-02-26 VITALS — WEIGHT: 125 LBS | BODY MASS INDEX: 28.12 KG/M2 | HEIGHT: 56 IN

## 2018-02-26 DIAGNOSIS — L84 CORN OR CALLUS: ICD-10-CM

## 2018-02-26 DIAGNOSIS — M20.10 VALGUS DEFORMITY OF GREAT TOE, UNSPECIFIED LATERALITY: ICD-10-CM

## 2018-02-26 DIAGNOSIS — M20.41 HAMMER TOES OF BOTH FEET: ICD-10-CM

## 2018-02-26 DIAGNOSIS — B35.1 ONYCHOMYCOSIS: ICD-10-CM

## 2018-02-26 DIAGNOSIS — E11.49 TYPE II DIABETES MELLITUS WITH NEUROLOGICAL MANIFESTATIONS: Primary | ICD-10-CM

## 2018-02-26 DIAGNOSIS — M20.42 HAMMER TOES OF BOTH FEET: ICD-10-CM

## 2018-02-26 DIAGNOSIS — I73.9 PERIPHERAL VASCULAR DISEASE: ICD-10-CM

## 2018-02-26 PROCEDURE — 1159F MED LIST DOCD IN RCRD: CPT | Mod: S$GLB,,,

## 2018-02-26 PROCEDURE — 99999 PR PBB SHADOW E&M-EST. PATIENT-LVL III: CPT | Mod: PBBFAC,,,

## 2018-02-26 PROCEDURE — 99499 UNLISTED E&M SERVICE: CPT | Mod: S$GLB,,,

## 2018-02-26 PROCEDURE — 11721 DEBRIDE NAIL 6 OR MORE: CPT | Mod: 59,Q9,S$GLB,

## 2018-02-26 PROCEDURE — 1126F AMNT PAIN NOTED NONE PRSNT: CPT | Mod: S$GLB,,,

## 2018-02-26 PROCEDURE — 3008F BODY MASS INDEX DOCD: CPT | Mod: S$GLB,,,

## 2018-02-26 PROCEDURE — 11055 PARING/CUTG B9 HYPRKER LES 1: CPT | Mod: Q9,S$GLB,,

## 2018-02-26 PROCEDURE — 99213 OFFICE O/P EST LOW 20 MIN: CPT | Mod: 25,S$GLB,,

## 2018-02-26 NOTE — PATIENT INSTRUCTIONS
How to Check Your Feet    Below are tips to help you look for foot problems. Try to check your feet at the same time each day, such as when you get out of bed in the morning.    · Check the top of each foot. The tops of toes, back of the heel, and outer edge of the foot can get a lot of rubbing from poor-fitting shoes.    · Check the bottom of each foot. Daily wear and tear often leads to problems at pressure spots.    · Check the toes and nails. Fungal infections often occur between toes. Toenail problems can also be a sign of fungal infections or lead to breaks in the skin.    · Check your shoes, too. Loose objects inside a shoe can injure the foot. Use your hand to feel inside your shoes for things like lelo, loose stitching, or rough areas that could irritate your skin.        Diabetic Foot Care    Diabetes can lead to a number of different foot complications. Fortunately, most of these complications can be prevented with a little extra foot care. If diabetes is not well controlled, the high blood sugar can cause damage to blood vessels and result in poor circulation to the foot. When the skin does not get enough blood flow, it becomes prone to pressure sores and ulcers, which heal slowly.  High blood sugar can also damage nerves, interfering with the ability to feel pain and pressure. When you cant feel your foot normally, it is easy to injure your skin, bones and joints without knowing it. For these reasons diabetes increases the risk of fungal infections, bunions and ulcers. Deep ulcers can lead to bone infection. Gangrene is the most serious foot complication of diabetes. It usually occurs on the tips of the toes as blacked areas of skin. The black area is dead tissue. In severe cases, gangrene spreads to involve the entire toe, other toes and the entire foot. Foot or toe amputation may be required. Good foot care and blood sugar control can prevent this.    Home Care  1. Wear comfortable, proper  fitting shoes.  2. Wash your feet daily with warm water and mild soap.  3. After drying, apply a moisturizing cream or lotion.  4. Check your feet daily for skin breaks, blisters, swelling, or redness. Look between your toes also.  5. Wear cotton socks and change them every day.  6. Trim toe nails carefully and do not cut your cuticles.  7. Strive to keep your blood sugar under control with a combination of medicines, diet and activity.  8. If you smoke and have diabetes, it is very important that you stop. Smoking reduces blood flow to your foot.  9. Avoid activities that increase your risk of foot injury:  · Do not walk barefoot.  · Do not use heating pads or hot water bottles on your feet.  · Do not put your foot in a hot tub without first checking the temperature with your hand.  10) Schedule yearly foot exams.    Follow Up  with your doctor or as advised by our staff. Report any cut, puncture, scrape, other injury, blister, ingrown toenail or ulcer on your foot.    Get Prompt Medical Attention  if any of the following occur:  -- Open ulcer with pus draining from the wound  -- Increasing foot or leg pain  -- New areas of redness or swelling or tender areas of the foot    © 4904-7276 [employer]. [address] [place] [address], [place] [address]. All rights reserved. This information is not intended as a substitute for professional medical care. Always follow your healthcare professional's instructions.

## 2018-02-26 NOTE — PROGRESS NOTES
Subjective:      Patient ID: Zeina White is a 89 y.o. female.    Chief Complaint: Routine Foot Care (Dr Shahnaz Gupta 2-8-18 A1c 6.7 2-8-18)    Zeina is a 89 y.o. female who presents to the clinic for evaluation and treatment of high risk feet. Zeina has a past medical history of Anxiety; Atypical chest pain; Dementia; Diabetes mellitus; DJD (degenerative joint disease); GERD (gastroesophageal reflux disease); Hyperlipidemia; Hypertension; Irritable bowel; Tremor, essential; and Vertigo. The patient's chief complaint is long, thick toenails. This patient has documented high risk feet requiring routine maintenance secondary to peripheral neuropathy.    PCP: Shahnaz Gupta MD        Current shoe gear:  Affected Foot: Rx diabetic extra depth shoes and custom accommodative insoles     Unaffected Foot: Rx diabetic extra depth shoes and custom accommodative insoles    Hemoglobin A1C   Date Value Ref Range Status   02/08/2018 6.7 (H) 4.0 - 5.6 % Final     Comment:     According to ADA guidelines, hemoglobin A1c <7.0% represents  optimal control in non-pregnant diabetic patients. Different  metrics may apply to specific patient populations.   Standards of Medical Care in Diabetes-2016.  For the purpose of screening for the presence of diabetes:  <5.7%     Consistent with the absence of diabetes  5.7-6.4%  Consistent with increasing risk for diabetes   (prediabetes)  >or=6.5%  Consistent with diabetes  Currently, no consensus exists for use of hemoglobin A1c  for diagnosis of diabetes for children.  This Hemoglobin A1c assay has significant interference with fetal   hemoglobin   (HbF). The results are invalid for patients with abnormal amounts of   HbF,   including those with known Hereditary Persistence   of Fetal Hemoglobin. Heterozygous hemoglobin variants (HbAS, HbAC,   HbAD, HbAE, HbA2) do not significantly interfere with this assay;   however, presence of multiple variants in a sample may impact the %    interference.     08/01/2017 7.6 (H) 4.0 - 5.6 % Final     Comment:     According to ADA guidelines, hemoglobin A1c <7.0% represents  optimal control in non-pregnant diabetic patients. Different  metrics may apply to specific patient populations.   Standards of Medical Care in Diabetes-2016.  For the purpose of screening for the presence of diabetes:  <5.7%     Consistent with the absence of diabetes  5.7-6.4%  Consistent with increasing risk for diabetes   (prediabetes)  >or=6.5%  Consistent with diabetes  Currently, no consensus exists for use of hemoglobin A1c  for diagnosis of diabetes for children.  This Hemoglobin A1c assay has significant interference with fetal   hemoglobin   (HbF). The results are invalid for patients with abnormal amounts of   HbF,   including those with known Hereditary Persistence   of Fetal Hemoglobin. Heterozygous hemoglobin variants (HbAS, HbAC,   HbAD, HbAE, HbA2) do not significantly interfere with this assay;   however, presence of multiple variants in a sample may impact the %   interference.     03/21/2017 9.6 (H) 4.5 - 6.2 % Final     Comment:     According to ADA guidelines, hemoglobin A1C <7.0% represents  optimal control in non-pregnant diabetic patients.  Different  metrics may apply to specific populations.   Standards of Medical Care in Diabetes - 2016.  For the purpose of screening for the presence of diabetes:  <5.7%     Consistent with the absence of diabetes  5.7-6.4%  Consistent with increasing risk for diabetes   (prediabetes)  >or=6.5%  Consistent with diabetes  Currently no consensus exists for use of hemoglobin A1C  for diagnosis of diabetes for children.         Past Medical History:   Diagnosis Date    Anxiety     Atypical chest pain     Dementia     Diabetes mellitus     DJD (degenerative joint disease)     GERD (gastroesophageal reflux disease)     Hyperlipidemia     Hypertension     Irritable bowel     Tremor, essential     Vertigo        Past  Surgical History:   Procedure Laterality Date    HYSTERECTOMY      JOINT REPLACEMENT      L total knee    KNEE SURGERY      bilat    QUADRICEPS REPAIR      R       Family History   Problem Relation Age of Onset    Cancer Mother      kidney    Diabetes Father     Parkinsonism Father     Alcohol abuse Brother        Social History     Social History    Marital status:      Spouse name: N/A    Number of children: N/A    Years of education: N/A     Social History Main Topics    Smoking status: Former Smoker     Types: Cigarettes     Quit date: 8/3/1957    Smokeless tobacco: Never Used      Comment: Sociall in her 30's    Alcohol use No    Drug use: No    Sexual activity: No     Other Topics Concern    None     Social History Narrative    None       Current Outpatient Prescriptions   Medication Sig Dispense Refill    acetaminophen (TYLENOL) 500 MG tablet Take 500 mg by mouth 3 (three) times daily as needed for Pain.      amLODIPine (NORVASC) 5 MG tablet Take 1 tablet (5 mg total) by mouth once daily. 90 tablet 3    b complex vitamins tablet Take 1 tablet by mouth once daily.      blood sugar diagnostic Strp 1 strip by Misc.(Non-Drug; Combo Route) route 2 (two) times daily. May have a 30 or 90 day supply 100 each 11    diclofenac sodium (VOLTAREN) 1 % Gel Apply 2 g topically once daily. 100 g 3    docusate sodium (COLACE) 100 MG capsule Take 100 mg by mouth once daily.      donepezil (ARICEPT) 5 MG tablet Take 1 tablet (5 mg total) by mouth every evening. 30 tablet 11    lisinopril (PRINIVIL,ZESTRIL) 40 MG tablet TAKE 1 TABLET(40 MG) BY MOUTH EVERY DAY 90 tablet 3    meclizine (ANTIVERT) 12.5 mg tablet Take 1 tablet (12.5 mg total) by mouth 3 (three) times daily as needed for Dizziness. 20 tablet 0    metFORMIN (GLUCOPHAGE) 500 MG tablet TAKE 1 TABLET BY MOUTH TWICE DAILY WITH MEALS 180 tablet 0    multivitamin capsule Take 1 capsule by mouth once daily.      nortriptyline (PAMELOR) 10  MG capsule   11    QUEtiapine (SEROQUEL) 25 MG Tab Take 2 tablets by mouth every day at 3pm and 2 and 1/2 tablets by mouth every evening. 405 tablet 1    ranitidine (ZANTAC) 300 MG tablet TAKE 1 TABLET(300 MG) BY MOUTH TWICE DAILY 180 tablet 0    sertraline (ZOLOFT) 25 MG tablet Take 1 tablet (25 mg total) by mouth once daily. 90 tablet 3    simvastatin (ZOCOR) 40 MG tablet Take 1 tablet (40 mg total) by mouth once daily. 90 tablet 0    TRUE METRIX GLUCOSE METER Misc       TRUEPLUS LANCETS 28 gauge Misc        No current facility-administered medications for this visit.        Review of patient's allergies indicates:   Allergen Reactions    Naproxen      Other reaction(s): Jittery         ROS  ROS:  Constitution: Negative for chills, fever, weakness and malaise/fatigue.   HEENT: Negative for headaches.   Cardiovascular: Negative for chest pain and claudication.   Respiratory: Negative for cough and shortness of breath.   Musculoskeletal: (--) for foot pain.  Negative for muscle cramps and muscle weakness.   Gastrointestinal: Negative for nausea and vomiting.   Neurological:(+) for numbness, tingling and paresthesias.   Dermatological:  (--) for skin rash, (+) for keratosis, (+) for fungal toenails, (+) for wound.          Objective:      Physical Exam  Constitutional:  Patient is oriented to person, place, and time. Vital signs are normal.  Appears well-developed and well-nourished.     Vascular:  Dorsalis pedis pulses are 1/4 on the right side, and 1/4 on the left side.   Posterior tibial pulses are 1/4 on the right side, and 1/4 on the left side.   (+) digital hair growth, capillary fill time to all toes <3 seconds, toes are cool touch, trace pedal swelling    Skin/Dermatological:  Skin is warm and intact.  No cyanosis or clubbing.  No rashes noted.  No open wounds.  Right 1, 2, 3, 4, 5 and left 1, 2, 3, 4, 5  toenails yellow discolored, thickened 2-4 mm to base with subungual debris.  (+) keratosis left  distal third toe    Musculoskeletal:      Hallux abducto valgus bilaterally, hammertoes 2-5 observed.  Pedal rom within normal limits.  (--) ankle joint DF restriction with both knee flexed and extened.    Neurological:  (+) deficits to sharp/dull, light touch or vibratory sensation bilateral feet, ten points tested.   Muscle strength to tibialis anterior, extensor hallucis longus, extensor digitorum longus, peroneal muscles, flexor hallucis/digotorum longus, posterior tibial and gastrosoleal complex is 5/5, normal tone without assymmetry   Patellar reflexes are 2+ on the right side and 2+ on the left side.  Achilles reflexes are 2+ on the right side and 2+ on the left side.        Assessment:       Encounter Diagnoses   Name Primary?    Type II diabetes mellitus with neurological manifestations Yes    Corn or callus     Onychomycosis     Hammer toes of both feet     Valgus deformity of great toe, unspecified laterality     Peripheral vascular disease          Plan:       Zeina was seen today for routine foot care.    Diagnoses and all orders for this visit:    Type II diabetes mellitus with neurological manifestations  -     DIABETIC SHOES FOR HOME USE    Corn or callus  -     DIABETIC SHOES FOR HOME USE    Onychomycosis  -     DIABETIC SHOES FOR HOME USE    Hammer toes of both feet  -     DIABETIC SHOES FOR HOME USE    Valgus deformity of great toe, unspecified laterality  -     DIABETIC SHOES FOR HOME USE    Peripheral vascular disease  -     DIABETIC SHOES FOR HOME USE      I counseled the patient on her conditions, their implications and medical management.    Shoe inspection. Diabetic Foot Education. Patient reminded of the importance of good nutrition and blood sugar control to help prevent podiatric complications of diabetes. Patient instructed on proper foot hygeine. We discussed wearing proper shoe gear, daily foot inspections, never walking without protective shoe gear, never putting sharp  instruments to feet.  We also discussed padding and shoes with high toe boxes for foot deformities.    - With patient's permission, right 1, 2, 3, 4, 5 and left 1, 2, 3, 4, 5 toenails were aggressively reduced and debrided  to their soft tissue attachment mechanically with nail nipper, removing all offending nail and debris. The porokeratotic tissue was pared x 1 with a 15 blade.  Patient relates relief following the procedure. Patient will continue to monitor the areas daily, inspect feet, wear protective shoe gear when ambulatory, moisturizer to maintain skin integrity and follow in this office in approximately 4 months, sooner p.adenike.        Wili Darnell DPM

## 2018-03-04 ENCOUNTER — PATIENT MESSAGE (OUTPATIENT)
Dept: INTERNAL MEDICINE | Facility: CLINIC | Age: 83
End: 2018-03-04

## 2018-04-02 ENCOUNTER — PATIENT MESSAGE (OUTPATIENT)
Dept: ORTHOPEDICS | Facility: CLINIC | Age: 83
End: 2018-04-02

## 2018-04-09 ENCOUNTER — HOSPITAL ENCOUNTER (OUTPATIENT)
Dept: VASCULAR SURGERY | Facility: CLINIC | Age: 83
Discharge: HOME | End: 2018-04-09
Attending: PHYSICIAN ASSISTANT
Payer: MEDICARE

## 2018-04-09 ENCOUNTER — OFFICE VISIT (OUTPATIENT)
Dept: ORTHOPEDICS | Facility: CLINIC | Age: 83
End: 2018-04-09
Payer: MEDICARE

## 2018-04-09 VITALS — HEIGHT: 56 IN | BODY MASS INDEX: 30.3 KG/M2 | WEIGHT: 134.69 LBS

## 2018-04-09 DIAGNOSIS — R60.9 EDEMA, UNSPECIFIED TYPE: ICD-10-CM

## 2018-04-09 DIAGNOSIS — M17.11 OSTEOARTHRITIS OF RIGHT KNEE, UNSPECIFIED OSTEOARTHRITIS TYPE: Primary | ICD-10-CM

## 2018-04-09 DIAGNOSIS — M79.89 RIGHT LEG SWELLING: ICD-10-CM

## 2018-04-09 PROCEDURE — 93971 EXTREMITY STUDY: CPT | Mod: S$GLB,,, | Performed by: SURGERY

## 2018-04-09 PROCEDURE — 20610 DRAIN/INJ JOINT/BURSA W/O US: CPT | Mod: RT,S$GLB,, | Performed by: PHYSICIAN ASSISTANT

## 2018-04-09 PROCEDURE — 99999 PR PBB SHADOW E&M-EST. PATIENT-LVL III: CPT | Mod: PBBFAC,,, | Performed by: PHYSICIAN ASSISTANT

## 2018-04-09 PROCEDURE — 99213 OFFICE O/P EST LOW 20 MIN: CPT | Mod: 25,S$GLB,, | Performed by: PHYSICIAN ASSISTANT

## 2018-04-09 RX ADMIN — TRIAMCINOLONE ACETONIDE 40 MG: 40 INJECTION, SUSPENSION INTRA-ARTICULAR; INTRAMUSCULAR at 11:04

## 2018-04-13 RX ORDER — TRIAMCINOLONE ACETONIDE 40 MG/ML
40 INJECTION, SUSPENSION INTRA-ARTICULAR; INTRAMUSCULAR
Status: COMPLETED | OUTPATIENT
Start: 2018-04-13 | End: 2018-04-09

## 2018-04-13 NOTE — PROGRESS NOTES
Subjective:      Patient ID: Zeina White is a 89 y.o. female.    Chief Complaint: No chief complaint on file.    HPI    Patient is a 89 year old female who presents to clinic with chief complaint of right knee pain. Patient stated that she will get intermittent right knee pain. Pain is increased with increased activity. Patient has tried rest ice Advil and icy hot which helps. She reports popping and cracking.   She also reports right leg swelling and posterior calf pain. Swelling not decreased with elevation.     Review of Systems   Constitution: Negative for chills and fever.   Cardiovascular: Negative for chest pain.   Respiratory: Negative for cough and shortness of breath.    Skin: Negative for color change, dry skin, itching, nail changes, poor wound healing and rash.   Musculoskeletal:        Right knee pain   Neurological: Negative for dizziness.   Psychiatric/Behavioral: Negative for altered mental status. The patient is not nervous/anxious.    All other systems reviewed and are negative.        Objective:        General    Constitutional: She is oriented to person, place, and time. She appears well-developed and well-nourished. No distress.   HENT:   Head: Atraumatic.   Eyes: Conjunctivae are normal.   Cardiovascular: Normal rate.    Pulmonary/Chest: Effort normal.   Neurological: She is alert and oriented to person, place, and time.   Psychiatric: She has a normal mood and affect. Her behavior is normal.           Right Knee Exam     Inspection   Swelling: absent  Bruising: absent    Tenderness   The patient is tender to palpation of the medial joint line.    Range of Motion   The patient has normal right knee ROM.    Tests   Meniscus   Keesha:  Medial - negative Lateral - negative  Ligament Examination Lachman: normal (-1 to 2mm) PCL-Posterior Drawer: normal (0 to 2mm)     MCL - Valgus: normal (0 to 2mm)  LCL - Varus: normal    Other   Sensation: normal    Muscle Strength   Right Lower Extremity    Quadriceps:  4/5 and 3/5   Hamstrin/5 and 3/5       RADS: no fracture or dislocation,  Left knee arthroplasty. Right knee DJD..        Assessment:       Encounter Diagnoses   Name Primary?    Osteoarthritis of right knee, unspecified osteoarthritis type Yes    Right leg swelling     Edema, unspecified type            Plan:       Discussed plan with patient and caregiver. Continue rest. Ice, icy hot, Advil as needed . Patient would like and injection today. Order also placed for DVT ultrasound to be done today. She is to return to clinic as needed     PROCEDURE:  I have explained the risks, benefits, and alternatives of the procedure in detail.  The patient voices understanding and all questions have been answered.  The patient agrees to proceed as planned. So after I performed a sterile prep of the skin in the normal fashion the right knee is injected using a 22 gauge needle from the anterolateral approach with a combination of 2cc 2% plain lidocaine and 40 mg of kenalog .  The patient is cautioned and immediate relief of pain is secondary to the local anesthetic and will be temporary.  After the anesthetic wears off there may be a increase in pain that may last for a few hours or a few days and they should use ice to help alleviate this flair up of pain.

## 2018-04-18 ENCOUNTER — TELEPHONE (OUTPATIENT)
Dept: PODIATRY | Facility: CLINIC | Age: 83
End: 2018-04-18

## 2018-04-18 NOTE — TELEPHONE ENCOUNTER
Spoke with daughter who reports reddness at bunion site, clipped small piece of skin a couple of days ago and mom has had increase in pain since.  Appt scheduled with DPM for eval on Monday in [place]

## 2018-05-10 ENCOUNTER — OFFICE VISIT (OUTPATIENT)
Dept: INTERNAL MEDICINE | Facility: CLINIC | Age: 83
End: 2018-05-10
Payer: MEDICARE

## 2018-05-10 VITALS
DIASTOLIC BLOOD PRESSURE: 64 MMHG | HEIGHT: 56 IN | SYSTOLIC BLOOD PRESSURE: 148 MMHG | WEIGHT: 135.38 LBS | HEART RATE: 59 BPM | BODY MASS INDEX: 30.46 KG/M2

## 2018-05-10 DIAGNOSIS — Z23 NEED FOR PNEUMOCOCCAL VACCINATION: ICD-10-CM

## 2018-05-10 DIAGNOSIS — E78.5 HYPERLIPIDEMIA, UNSPECIFIED HYPERLIPIDEMIA TYPE: ICD-10-CM

## 2018-05-10 DIAGNOSIS — E53.8 LOW SERUM VITAMIN B12: ICD-10-CM

## 2018-05-10 DIAGNOSIS — M81.0 OSTEOPOROSIS, UNSPECIFIED OSTEOPOROSIS TYPE, UNSPECIFIED PATHOLOGICAL FRACTURE PRESENCE: ICD-10-CM

## 2018-05-10 DIAGNOSIS — F03.91 DEMENTIA WITH BEHAVIORAL DISTURBANCE, UNSPECIFIED DEMENTIA TYPE: ICD-10-CM

## 2018-05-10 DIAGNOSIS — I10 ESSENTIAL HYPERTENSION: ICD-10-CM

## 2018-05-10 DIAGNOSIS — K21.9 GASTROESOPHAGEAL REFLUX DISEASE, ESOPHAGITIS PRESENCE NOT SPECIFIED: ICD-10-CM

## 2018-05-10 DIAGNOSIS — R42 VERTIGO: Primary | ICD-10-CM

## 2018-05-10 DIAGNOSIS — G47.00 INSOMNIA, UNSPECIFIED TYPE: ICD-10-CM

## 2018-05-10 DIAGNOSIS — M19.90 OSTEOARTHRITIS, UNSPECIFIED OSTEOARTHRITIS TYPE, UNSPECIFIED SITE: ICD-10-CM

## 2018-05-10 DIAGNOSIS — E11.49 TYPE II DIABETES MELLITUS WITH NEUROLOGICAL MANIFESTATIONS: ICD-10-CM

## 2018-05-10 PROCEDURE — 99999 PR PBB SHADOW E&M-EST. PATIENT-LVL III: CPT | Mod: PBBFAC,,, | Performed by: INTERNAL MEDICINE

## 2018-05-10 PROCEDURE — 99499 UNLISTED E&M SERVICE: CPT | Mod: S$PBB,,, | Performed by: INTERNAL MEDICINE

## 2018-05-10 PROCEDURE — 99214 OFFICE O/P EST MOD 30 MIN: CPT | Mod: S$GLB,,, | Performed by: INTERNAL MEDICINE

## 2018-05-10 NOTE — PROGRESS NOTES
Internal Medicine    Subjective:      Patient ID: Zeina Whiet is a 89 y.o. female.    Chief Complaint: Follow-up (dementia with behavioral, unspecified dementia type )    HPI:  Patient presents for follow up appointment.  The patient's last visit with me was on 2/8/2018.    Vertigo:  Patient has had vertigo in the past but it has come back the last few days.  Has meclizine at home which helps some.  Also reports history of syncope but has not had this recently.  Dizziness is worse when bending over or turning head a certain way.      Dementia with behavioral disturbance, depression:  Patient has 24/7 sitters.  Followed by Neurology (Dr. Churchill) - last seen 12/20/17.  Taking Aricept 5mg qHS, Zoloft 25mg daily, and Seroquel 50mg at 3pm + 62.5mg at 6pm.  Daughter states patient is doing very well.       HTN:  Taking Norvasc 5mg daily and lisinopril 40mg daily.  Recommended checking BP daily and bringing log.  Did not bring today.     HLD:  Taking simvistatin 40mg daily.  Denies side effects.     DM2:  Taking metformin 500mg BID.  Denies side effects.  Last HgA1c 6.7 on 2/8/18.  Daughter reports FSG always 126 or below.  Denies episodes of hypoglycemia.        GERD:  Takes Zantac 150mg BID.        Osteoporosis:  Last DEXA in 2009 showing osteoporosis of hip, recommendation was repeat in 2 years.  Daughter unaware that patient ever had osteoporosis - says she was never treated.  Patient has had hip fracture since then.  Declining repeat DEXA.  Daughter does not believe patient could sit up long enough to take bisphosphonate.  Discussed option of intravenous.  Declining treatment or Endocrine referral at this time.       Vit B12:  Borderline low at 221 on 8/1/17.  Taking B12 1000mcg daily.  Repeat B12 651 on 2/8/18.     Bilateral knee OA (R>L):  Saw MAGDALENA Howell on 4/13/18.  Given steroid injection.  Patient also using Voltaren gel at home and taking buprofen and Tylenol alternating as needed (not  "often).     Macular degeneration:  Sees Ophthalmology.  Taking Preservision.       Fragile X Syndrome.       Review of Systems   Constitutional: Negative for appetite change, chills, fatigue, fever and unexpected weight change.   HENT: Negative for sore throat and trouble swallowing.    Eyes: Negative for visual disturbance.   Respiratory: Negative for cough, chest tightness, shortness of breath and wheezing.    Cardiovascular: Negative for chest pain, palpitations and leg swelling.   Gastrointestinal: Negative for abdominal pain, blood in stool, constipation, diarrhea, nausea and vomiting.   Genitourinary: Negative for difficulty urinating.   Musculoskeletal: Positive for arthralgias. Negative for myalgias.   Skin: Negative for rash and wound.   Neurological: Positive for dizziness. Negative for weakness, numbness and headaches.   Psychiatric/Behavioral: Negative for behavioral problems and confusion.       Past medical history, surgical history, and family medical history reviewed and updated as appropriate.    Medications and allergies reviewed.     Objective:     Vitals:    05/10/18 1133   BP: (!) 148/64   Pulse: (!) 59   Weight: 61.4 kg (135 lb 5.8 oz)   Height: 4' 8" (1.422 m)     Physical Exam   Constitutional: She appears well-developed and well-nourished. No distress.   Elderly female, sitting in wheelchair.  Decreased hearing.   HENT:   Head: Normocephalic and atraumatic.   Eyes: Conjunctivae and EOM are normal. No scleral icterus.   Cardiovascular: Normal rate and regular rhythm.  Exam reveals no gallop and no friction rub.    No murmur heard.  Pulmonary/Chest: Effort normal and breath sounds normal. No respiratory distress. She has no wheezes. She has no rales.   Abdominal: Soft. She exhibits no distension.   Musculoskeletal: She exhibits no edema or tenderness.   No swelling, redness, or tenderness in right knee.   Neurological: She is alert.   Skin: No rash noted. No erythema.   Psychiatric: She has a " normal mood and affect. Her behavior is normal.       RESULTS:   Lab Results   Component Value Date    WBC 7.98 02/08/2018    HGB 13.2 02/08/2018    HCT 40.5 02/08/2018    MCV 91 02/08/2018     02/08/2018     BMP  Lab Results   Component Value Date     02/08/2018    K 4.6 02/08/2018     02/08/2018    CO2 30 (H) 02/08/2018    BUN 13 02/08/2018    CREATININE 0.7 02/08/2018    CALCIUM 9.7 02/08/2018    ANIONGAP 10 02/08/2018    ESTGFRAFRICA >60 02/08/2018    EGFRNONAA >60 02/08/2018     Lab Results   Component Value Date    ALT 23 02/08/2018    AST 22 02/08/2018    ALKPHOS 74 02/08/2018    BILITOT 0.3 02/08/2018     Lab Results   Component Value Date    TSH 1.213 08/01/2017     Lab Results   Component Value Date    HGBA1C 6.7 (H) 02/08/2018         Assessment:     1. Vertigo    2. Essential hypertension    3. Hyperlipidemia, unspecified hyperlipidemia type    4. Dementia with behavioral disturbance, unspecified dementia type    5. Gastroesophageal reflux disease, esophagitis presence not specified    6. Type II diabetes mellitus with neurological manifestations    7. Osteoporosis, unspecified osteoporosis type, unspecified pathological fracture presence    8. Osteoarthritis, unspecified osteoarthritis type, unspecified site    9. Low serum vitamin B12    10. Insomnia, unspecified type    11. Need for pneumococcal vaccination        Plan:     *Continue medication/plan as discussed in HPI except for changes discussed below.    Zeina was seen today for follow-up.    Diagnoses and all orders for this visit:    Vertigo   Declining ENT referral at this time.  Continue Meclizine PRN.  -     CBC auto differential; Future; Expected date: 05/10/2018  -     Comprehensive metabolic panel; Future; Expected date: 05/10/2018    Essential hypertension  -     CBC auto differential; Future; Expected date: 05/10/2018  -     Comprehensive metabolic panel; Future; Expected date: 05/10/2018  -     TSH; Future; Expected  date: 05/10/2018    Hyperlipidemia, unspecified hyperlipidemia type    Dementia with behavioral disturbance, unspecified dementia type  -     TSH; Future; Expected date: 05/10/2018    Gastroesophageal reflux disease, esophagitis presence not specified    Type II diabetes mellitus with neurological manifestations  -     Hemoglobin A1c; Future; Expected date: 05/10/2018    Osteoporosis, unspecified osteoporosis type, unspecified pathological fracture presence  -     Vitamin D; Future; Expected date: 05/10/2018    Osteoarthritis, unspecified osteoarthritis type, unspecified site    Low serum vitamin B12    Insomnia, unspecified type    Need for pneumococcal vaccination    Pneumococcal Polysaccharide Vaccine (23 Valent) (SQ/IM)    Health maintenance reviewed with patient.     Follow-up in about 3 months (around 8/10/2018).    Shahnaz Gupta MD  Internal Medicine  [facility]

## 2018-05-28 ENCOUNTER — OFFICE VISIT (OUTPATIENT)
Dept: PODIATRY | Facility: CLINIC | Age: 83
End: 2018-05-28
Payer: MEDICARE

## 2018-05-28 VITALS — WEIGHT: 135 LBS | BODY MASS INDEX: 30.37 KG/M2 | HEIGHT: 56 IN

## 2018-05-28 DIAGNOSIS — M20.42 HAMMER TOES OF BOTH FEET: ICD-10-CM

## 2018-05-28 DIAGNOSIS — E11.49 TYPE II DIABETES MELLITUS WITH NEUROLOGICAL MANIFESTATIONS: Primary | ICD-10-CM

## 2018-05-28 DIAGNOSIS — B35.1 ONYCHOMYCOSIS: ICD-10-CM

## 2018-05-28 DIAGNOSIS — L84 CORN OR CALLUS: ICD-10-CM

## 2018-05-28 DIAGNOSIS — M20.41 HAMMER TOES OF BOTH FEET: ICD-10-CM

## 2018-05-28 DIAGNOSIS — M20.10 VALGUS DEFORMITY OF GREAT TOE, UNSPECIFIED LATERALITY: ICD-10-CM

## 2018-05-28 PROCEDURE — 99499 UNLISTED E&M SERVICE: CPT | Mod: S$GLB,,,

## 2018-05-28 PROCEDURE — 11721 DEBRIDE NAIL 6 OR MORE: CPT | Mod: 59,Q9,S$GLB,

## 2018-05-28 PROCEDURE — 99499 UNLISTED E&M SERVICE: CPT | Mod: S$PBB,,,

## 2018-05-28 PROCEDURE — 99999 PR PBB SHADOW E&M-EST. PATIENT-LVL III: CPT | Mod: PBBFAC,,,

## 2018-05-28 PROCEDURE — 11055 PARING/CUTG B9 HYPRKER LES 1: CPT | Mod: Q9,S$GLB,,

## 2018-05-28 RX ORDER — LANCETS 33 GAUGE
EACH MISCELLANEOUS
COMMUNITY
Start: 2018-03-13

## 2018-05-29 NOTE — PROGRESS NOTES
Subjective:      Patient ID: Zeina White is a 89 y.o. female.    Chief Complaint: Diabetic Foot Exam ([facility] Dr.Jennifer Gupta 5-12-18 A1c 6.7 2-8-18)    Zeina is a 89 y.o. female who presents to the clinic for evaluation and treatment of high risk feet. Zeina has a past medical history of Anxiety; Atypical chest pain; Dementia; Diabetes mellitus; DJD (degenerative joint disease); GERD (gastroesophageal reflux disease); Hyperlipidemia; Hypertension; Irritable bowel; Tremor, essential; and Vertigo. The patient's chief complaint islong thick toenails.  This patient has documented high risk feet requiring routine maintenance secondary to peripheral neuropathy.    PCP: Shahnaz Gupta MD        Current shoe gear:  Affected Foot: Rx diabetic extra depth shoes and custom accommodative insoles     Unaffected Foot: Rx diabetic extra depth shoes and custom accommodative insoles    Hemoglobin A1C   Date Value Ref Range Status   02/08/2018 6.7 (H) 4.0 - 5.6 % Final     Comment:     According to ADA guidelines, hemoglobin A1c <7.0% represents  optimal control in non-pregnant diabetic patients. Different  metrics may apply to specific patient populations.   Standards of Medical Care in Diabetes-2016.  For the purpose of screening for the presence of diabetes:  <5.7%     Consistent with the absence of diabetes  5.7-6.4%  Consistent with increasing risk for diabetes   (prediabetes)  >or=6.5%  Consistent with diabetes  Currently, no consensus exists for use of hemoglobin A1c  for diagnosis of diabetes for children.  This Hemoglobin A1c assay has significant interference with fetal   hemoglobin   (HbF). The results are invalid for patients with abnormal amounts of   HbF,   including those with known Hereditary Persistence   of Fetal Hemoglobin. Heterozygous hemoglobin variants (HbAS, HbAC,   HbAD, HbAE, HbA2) do not significantly interfere with this assay;   however, presence of multiple variants in a sample may impact the  %   interference.     08/01/2017 7.6 (H) 4.0 - 5.6 % Final     Comment:     According to ADA guidelines, hemoglobin A1c <7.0% represents  optimal control in non-pregnant diabetic patients. Different  metrics may apply to specific patient populations.   Standards of Medical Care in Diabetes-2016.  For the purpose of screening for the presence of diabetes:  <5.7%     Consistent with the absence of diabetes  5.7-6.4%  Consistent with increasing risk for diabetes   (prediabetes)  >or=6.5%  Consistent with diabetes  Currently, no consensus exists for use of hemoglobin A1c  for diagnosis of diabetes for children.  This Hemoglobin A1c assay has significant interference with fetal   hemoglobin   (HbF). The results are invalid for patients with abnormal amounts of   HbF,   including those with known Hereditary Persistence   of Fetal Hemoglobin. Heterozygous hemoglobin variants (HbAS, HbAC,   HbAD, HbAE, HbA2) do not significantly interfere with this assay;   however, presence of multiple variants in a sample may impact the %   interference.     03/21/2017 9.6 (H) 4.5 - 6.2 % Final     Comment:     According to ADA guidelines, hemoglobin A1C <7.0% represents  optimal control in non-pregnant diabetic patients.  Different  metrics may apply to specific populations.   Standards of Medical Care in Diabetes - 2016.  For the purpose of screening for the presence of diabetes:  <5.7%     Consistent with the absence of diabetes  5.7-6.4%  Consistent with increasing risk for diabetes   (prediabetes)  >or=6.5%  Consistent with diabetes  Currently no consensus exists for use of hemoglobin A1C  for diagnosis of diabetes for children.         Past Medical History:   Diagnosis Date    Anxiety     Atypical chest pain     Dementia     Diabetes mellitus     DJD (degenerative joint disease)     GERD (gastroesophageal reflux disease)     Hyperlipidemia     Hypertension     Irritable bowel     Tremor, essential     Vertigo        Past  Surgical History:   Procedure Laterality Date    HYSTERECTOMY      JOINT REPLACEMENT      L total knee    KNEE SURGERY      bilat    QUADRICEPS REPAIR      R       Family History   Problem Relation Age of Onset    Cancer Mother         kidney    Diabetes Father     Parkinsonism Father     Alcohol abuse Brother        Social History     Social History    Marital status:      Spouse name: N/A    Number of children: N/A    Years of education: N/A     Social History Main Topics    Smoking status: Former Smoker     Types: Cigarettes     Quit date: 8/3/1957    Smokeless tobacco: Never Used      Comment: Sociall in her 30's    Alcohol use No    Drug use: No    Sexual activity: No     Other Topics Concern    None     Social History Narrative    None       Current Outpatient Prescriptions   Medication Sig Dispense Refill    acetaminophen (TYLENOL) 500 MG tablet Take 500 mg by mouth 3 (three) times daily as needed for Pain.      amLODIPine (NORVASC) 5 MG tablet Take 1 tablet (5 mg total) by mouth once daily. 90 tablet 3    b complex vitamins tablet Take 1 tablet by mouth once daily.      blood sugar diagnostic Strp 1 strip by Misc.(Non-Drug; Combo Route) route 2 (two) times daily. May have a 30 or 90 day supply 100 each 11    diclofenac sodium (VOLTAREN) 1 % Gel Apply 2 g topically once daily. 100 g 3    docusate sodium (COLACE) 100 MG capsule Take 100 mg by mouth once daily.      lisinopril (PRINIVIL,ZESTRIL) 40 MG tablet TAKE 1 TABLET(40 MG) BY MOUTH EVERY DAY 90 tablet 3    meclizine (ANTIVERT) 12.5 mg tablet Take 1 tablet (12.5 mg total) by mouth 3 (three) times daily as needed for Dizziness. 20 tablet 0    metFORMIN (GLUCOPHAGE) 500 MG tablet TAKE 1 TABLET BY MOUTH TWICE DAILY WITH MEALS 180 tablet 0    multivitamin capsule Take 1 capsule by mouth once daily.      QUEtiapine (SEROQUEL) 25 MG Tab Take 2 tablets by mouth every day at 3pm and 2 and 1/2 tablets by mouth every evening. 405  tablet 1    ranitidine (ZANTAC) 150 MG tablet Take 1 tablet (150 mg total) by mouth 2 (two) times daily. 180 tablet 1    sertraline (ZOLOFT) 25 MG tablet Take 1 tablet (25 mg total) by mouth once daily. 90 tablet 3    simvastatin (ZOCOR) 40 MG tablet Take 1 tablet (40 mg total) by mouth once daily. 90 tablet 0    TRUE METRIX GLUCOSE METER Misc       TRUEPLUS LANCETS 28 gauge Misc       TRUEPLUS LANCETS 33 gauge Misc       donepezil (ARICEPT) 5 MG tablet Take 1 tablet (5 mg total) by mouth every evening. 30 tablet 11    nortriptyline (PAMELOR) 10 MG capsule   11     No current facility-administered medications for this visit.        Review of patient's allergies indicates:   Allergen Reactions    Naproxen      Other reaction(s): Jittery         ROS  ROS:  Constitution: Negative for chills, fever, weakness and malaise/fatigue.   HEENT: Negative for headaches.   Cardiovascular: Negative for chest pain and claudication.   Respiratory: Negative for cough and shortness of breath.   Musculoskeletal: (--) for foot pain.  Negative for muscle cramps and muscle weakness.   Gastrointestinal: Negative for nausea and vomiting.   Neurological:(+) for numbness, tingling and paresthesias.   Dermatological:  (--) for skin rash, (+) for keratosis, (+) for fungal toenails, (+) for wound.          Objective:      Physical Exam  Constitutional:  Patient is oriented to person, place, and time. Vital signs are normal.  Appears well-developed and well-nourished.     Vascular:  Dorsalis pedis pulses are 1/4 on the right side, and 1/4 on the left side.   Posterior tibial pulses are 1/4 on the right side, and 1/4 on the left side.   (+) digital hair growth, capillary fill time to all toes <3 seconds, toes are cool touch, trace pedal swelling    Skin/Dermatological:  Skin is warm and intact.  No cyanosis or clubbing.  No rashes noted.  No open wounds.  Right 1, 2, 3, 4, 5 and left 1, 2, 3, 4, 5  toenails yellow discolored, thickened  2-4 mm to base with subungual debris.  (+) keratosis left distal third toe    Musculoskeletal:      Hallux abducto valgus bilaterally, hammertoes 2-5 observed.  Pedal rom within normal limits.  (--) ankle joint DF restriction with both knee flexed and extened.    Neurological:  (+) deficits to sharp/dull, light touch or vibratory sensation bilateral feet, ten points tested.   Muscle strength to tibialis anterior, extensor hallucis longus, extensor digitorum longus, peroneal muscles, flexor hallucis/digotorum longus, posterior tibial and gastrosoleal complex is 5/5, normal tone without assymmetry   Patellar reflexes are 2+ on the right side and 2+ on the left side.  Achilles reflexes are 2+ on the right side and 2+ on the left side.        Assessment:       Encounter Diagnoses   Name Primary?    Type II diabetes mellitus with neurological manifestations Yes    Corn or callus     Onychomycosis     Hammer toes of both feet     Valgus deformity of great toe, unspecified laterality          Plan:       Zeina was seen today for diabetic foot exam.    Diagnoses and all orders for this visit:    Type II diabetes mellitus with neurological manifestations    Corn or callus    Onychomycosis    Hammer toes of both feet    Valgus deformity of great toe, unspecified laterality      I counseled the patient on her conditions, their implications and medical management.    Shoe inspection. Diabetic Foot Education. Patient reminded of the importance of good nutrition and blood sugar control to help prevent podiatric complications of diabetes. Patient instructed on proper foot hygeine. We discussed wearing proper shoe gear, daily foot inspections, never walking without protective shoe gear, never putting sharp instruments to feet.  We also discussed padding and shoes with high toe boxes for foot deformities.    - With patient's permission, right 1, 2, 3, 4, 5 and left 1, 2, 3, 4, 5 toenails were aggressively reduced and debrided   to their soft tissue attachment mechanically with nail nipper, removing all offending nail and debris. The porokeratotic tissue was pared x 1 with a 15 blade.  Patient relates relief following the procedure. Patient will continue to monitor the areas daily, inspect feet, wear protective shoe gear when ambulatory, moisturizer to maintain skin integrity and follow in this office in approximately 3-4 months, sooner p.adenike.        Wili Darnell DPM

## 2018-06-28 PROBLEM — I70.0 AORTIC ATHEROSCLEROSIS: Status: ACTIVE | Noted: 2018-06-28

## 2018-07-17 ENCOUNTER — PATIENT MESSAGE (OUTPATIENT)
Dept: INTERNAL MEDICINE | Facility: CLINIC | Age: 83
End: 2018-07-17

## 2018-07-18 ENCOUNTER — PATIENT MESSAGE (OUTPATIENT)
Dept: INTERNAL MEDICINE | Facility: CLINIC | Age: 83
End: 2018-07-18

## 2018-07-27 RX ORDER — SIMVASTATIN 40 MG/1
TABLET, FILM COATED ORAL
Qty: 90 TABLET | Refills: 3 | Status: SHIPPED | OUTPATIENT
Start: 2018-07-27 | End: 2019-08-04 | Stop reason: SDUPTHER

## 2018-08-07 ENCOUNTER — LAB VISIT (OUTPATIENT)
Dept: LAB | Facility: HOSPITAL | Age: 83
End: 2018-08-07
Attending: INTERNAL MEDICINE
Payer: MEDICARE

## 2018-08-07 DIAGNOSIS — R42 VERTIGO: ICD-10-CM

## 2018-08-07 DIAGNOSIS — M81.0 OSTEOPOROSIS, UNSPECIFIED OSTEOPOROSIS TYPE, UNSPECIFIED PATHOLOGICAL FRACTURE PRESENCE: ICD-10-CM

## 2018-08-07 DIAGNOSIS — I10 ESSENTIAL HYPERTENSION: ICD-10-CM

## 2018-08-07 DIAGNOSIS — E11.49 TYPE II DIABETES MELLITUS WITH NEUROLOGICAL MANIFESTATIONS: ICD-10-CM

## 2018-08-07 DIAGNOSIS — F03.91 DEMENTIA WITH BEHAVIORAL DISTURBANCE, UNSPECIFIED DEMENTIA TYPE: ICD-10-CM

## 2018-08-07 LAB
25(OH)D3+25(OH)D2 SERPL-MCNC: 31 NG/ML
ALBUMIN SERPL BCP-MCNC: 3.7 G/DL
ALP SERPL-CCNC: 70 U/L
ALT SERPL W/O P-5'-P-CCNC: 13 U/L
ANION GAP SERPL CALC-SCNC: 11 MMOL/L
AST SERPL-CCNC: 22 U/L
BASOPHILS # BLD AUTO: 0.08 K/UL
BASOPHILS NFR BLD: 1.1 %
BILIRUB SERPL-MCNC: 0.4 MG/DL
BUN SERPL-MCNC: 13 MG/DL
CALCIUM SERPL-MCNC: 9.5 MG/DL
CHLORIDE SERPL-SCNC: 107 MMOL/L
CO2 SERPL-SCNC: 25 MMOL/L
CREAT SERPL-MCNC: 0.7 MG/DL
DIFFERENTIAL METHOD: ABNORMAL
EOSINOPHIL # BLD AUTO: 0.3 K/UL
EOSINOPHIL NFR BLD: 4.3 %
ERYTHROCYTE [DISTWIDTH] IN BLOOD BY AUTOMATED COUNT: 13.5 %
EST. GFR  (AFRICAN AMERICAN): >60 ML/MIN/1.73 M^2
EST. GFR  (NON AFRICAN AMERICAN): >60 ML/MIN/1.73 M^2
ESTIMATED AVG GLUCOSE: 160 MG/DL
GLUCOSE SERPL-MCNC: 178 MG/DL
HBA1C MFR BLD HPLC: 7.2 %
HCT VFR BLD AUTO: 42 %
HGB BLD-MCNC: 13.3 G/DL
IMM GRANULOCYTES # BLD AUTO: 0.04 K/UL
IMM GRANULOCYTES NFR BLD AUTO: 0.6 %
LYMPHOCYTES # BLD AUTO: 1.7 K/UL
LYMPHOCYTES NFR BLD: 23.8 %
MCH RBC QN AUTO: 30 PG
MCHC RBC AUTO-ENTMCNC: 31.7 G/DL
MCV RBC AUTO: 95 FL
MONOCYTES # BLD AUTO: 0.6 K/UL
MONOCYTES NFR BLD: 8.3 %
NEUTROPHILS # BLD AUTO: 4.5 K/UL
NEUTROPHILS NFR BLD: 61.9 %
NRBC BLD-RTO: 0 /100 WBC
PLATELET # BLD AUTO: 184 K/UL
PMV BLD AUTO: 12.9 FL
POTASSIUM SERPL-SCNC: 4.4 MMOL/L
PROT SERPL-MCNC: 6.8 G/DL
RBC # BLD AUTO: 4.44 M/UL
SODIUM SERPL-SCNC: 143 MMOL/L
T4 FREE SERPL-MCNC: 1 NG/DL
TSH SERPL DL<=0.005 MIU/L-ACNC: 0.27 UIU/ML
WBC # BLD AUTO: 7.22 K/UL

## 2018-08-07 PROCEDURE — 84443 ASSAY THYROID STIM HORMONE: CPT

## 2018-08-07 PROCEDURE — 36415 COLL VENOUS BLD VENIPUNCTURE: CPT | Mod: PO

## 2018-08-07 PROCEDURE — 83036 HEMOGLOBIN GLYCOSYLATED A1C: CPT

## 2018-08-07 PROCEDURE — 82306 VITAMIN D 25 HYDROXY: CPT

## 2018-08-07 PROCEDURE — 85025 COMPLETE CBC W/AUTO DIFF WBC: CPT

## 2018-08-07 PROCEDURE — 84439 ASSAY OF FREE THYROXINE: CPT

## 2018-08-07 PROCEDURE — 80053 COMPREHEN METABOLIC PANEL: CPT

## 2018-08-13 ENCOUNTER — OFFICE VISIT (OUTPATIENT)
Dept: INTERNAL MEDICINE | Facility: CLINIC | Age: 83
End: 2018-08-13
Payer: MEDICARE

## 2018-08-13 VITALS
HEART RATE: 58 BPM | BODY MASS INDEX: 31.64 KG/M2 | SYSTOLIC BLOOD PRESSURE: 138 MMHG | HEIGHT: 56 IN | WEIGHT: 140.63 LBS | DIASTOLIC BLOOD PRESSURE: 60 MMHG

## 2018-08-13 DIAGNOSIS — G89.29 CHRONIC PAIN OF RIGHT KNEE: ICD-10-CM

## 2018-08-13 DIAGNOSIS — F03.91 DEMENTIA WITH BEHAVIORAL DISTURBANCE, UNSPECIFIED DEMENTIA TYPE: ICD-10-CM

## 2018-08-13 DIAGNOSIS — G89.29 CHRONIC NECK PAIN: ICD-10-CM

## 2018-08-13 DIAGNOSIS — M54.2 CHRONIC NECK PAIN: ICD-10-CM

## 2018-08-13 DIAGNOSIS — E05.90 SUBCLINICAL HYPERTHYROIDISM: ICD-10-CM

## 2018-08-13 DIAGNOSIS — E78.5 HYPERLIPIDEMIA, UNSPECIFIED HYPERLIPIDEMIA TYPE: ICD-10-CM

## 2018-08-13 DIAGNOSIS — F33.42 RECURRENT MAJOR DEPRESSIVE DISORDER, IN FULL REMISSION: ICD-10-CM

## 2018-08-13 DIAGNOSIS — K58.1 IRRITABLE BOWEL SYNDROME WITH CONSTIPATION: ICD-10-CM

## 2018-08-13 DIAGNOSIS — M25.561 CHRONIC PAIN OF RIGHT KNEE: ICD-10-CM

## 2018-08-13 DIAGNOSIS — I10 ESSENTIAL HYPERTENSION: Primary | ICD-10-CM

## 2018-08-13 PROCEDURE — 99214 OFFICE O/P EST MOD 30 MIN: CPT | Mod: S$GLB,,, | Performed by: INTERNAL MEDICINE

## 2018-08-13 PROCEDURE — 99499 UNLISTED E&M SERVICE: CPT | Mod: S$GLB,,, | Performed by: INTERNAL MEDICINE

## 2018-08-13 PROCEDURE — 99999 PR PBB SHADOW E&M-EST. PATIENT-LVL III: CPT | Mod: PBBFAC,,, | Performed by: INTERNAL MEDICINE

## 2018-08-13 RX ORDER — QUETIAPINE FUMARATE 25 MG/1
TABLET, FILM COATED ORAL
Qty: 405 TABLET | Refills: 0 | Status: SHIPPED | OUTPATIENT
Start: 2018-08-13 | End: 2018-11-09 | Stop reason: SDUPTHER

## 2018-08-13 NOTE — PROGRESS NOTES
Internal Medicine    Subjective:      Patient ID: Zeina White is a 89 y.o. female.    Chief Complaint: Follow-up    HPI:  Patient presents for follow up appointment.  The patient's last visit with me was on 5/10/2018.    Bilateral knee OA (R>L):  Saw MAGDALENA Howell on 4/13/18.  Given steroid injection.  Patient also using Voltaren gel at home and taking ibuprofen and Tylenol alternating as needed (not often).  Says pain has been bad lately and is interested in seeing Ortho again for possible injection.  Plans to start moving around the house more.  Interested in Home Health PT.    IBS, predominantly constipation:  Has struggled with this for over 45 years.  Has seen GI on many occasions per daughter.  Has always controlled her BM's herself through laxatives or enemas.  She continues to demand these from caregivers even if she has had multiple BM's the day before.  She reports she feels pressure on her rectum when she wakes up in the morning and so she feels she needs to go.  Per daughter, this is a chronic complaint.  Gas-x has been helpful in the past but not consistently.  For the last 2 weeks, has had BM's most days without any laxative per daughter.  Plans to start moving around the house more.    Subclinical hyperthyroidism:  TSH slightly low at 0.274 on 8/7/18 but fT4 normal at 1.0.  Plan is to recheck at next appointment.    Headaches, chronic neck pain:  Patient has not been complaining of this until this morning per daughter.  Said headaches occur mostly in the morning.  She is unsure of triggers.  Daughter it feels it could be the position the patient is sleeping in.  Patient points to her neck when describing location.      Vertigo:  Still occurs from time to time.  Has meclizine at home which helps some.      Dementia with behavioral disturbance, depression:  Patient has 24/7 sitters.  Followed by Neurology (Dr. Churchill) - last seen 12/20/17.  Taking Aricept 5mg qHS, Zoloft 25mg daily, and Seroquel  "50mg at 3pm + 62.5mg at 6pm.      HTN:  Taking Norvasc 5mg daily and lisinopril 40mg daily.  BP stable.     HLD:  Taking simvistatin 40mg daily.  Denies side effects.     DM2:  Taking metformin 500mg BID.  Denies side effects.  Last HgA1c 7.2 on 8/7/18.  FSG running 120-160's per glucose log.        GERD:  Takes Zantac 150mg BID.        Osteoporosis:  Last DEXA in 2009 showing osteoporosis of hip, recommendation was repeat in 2 years.  Family has declined repeat DEXA, treatment for osteoporosis, and Endocrine referral.    Vit B12:  Borderline low at 221 on 8/1/17.  Taking B12 1000mcg daily.  Repeat B12 651 on 2/8/18.     Macular degeneration:  Sees Ophthalmology.  Taking Preservision.       Fragile X Syndrome.       Review of Systems   Constitutional: Negative for appetite change, chills, fatigue, fever and unexpected weight change.   HENT: Negative for sore throat and trouble swallowing.    Eyes: Positive for visual disturbance.   Respiratory: Negative for cough, chest tightness, shortness of breath and wheezing.    Cardiovascular: Negative for chest pain, palpitations and leg swelling.   Gastrointestinal: Positive for abdominal pain (Comes and goes, mild) and constipation. Negative for blood in stool, diarrhea, nausea and vomiting.   Genitourinary: Negative for difficulty urinating.   Musculoskeletal: Negative for arthralgias and myalgias.   Skin: Negative for rash and wound.   Neurological: Positive for dizziness, weakness and headaches. Negative for numbness.   Psychiatric/Behavioral: Negative for behavioral problems, confusion and dysphoric mood. The patient is not nervous/anxious.        Past medical history, surgical history, and family medical history reviewed and updated as appropriate.    Medications and allergies reviewed.     Objective:     Vitals:    08/13/18 1047   BP: 138/60   Pulse: (!) 58   Weight: 63.8 kg (140 lb 10.5 oz)   Height: 4' 8" (1.422 m)     Physical Exam   Constitutional: She appears " well-developed and well-nourished. No distress.   HENT:   Head: Normocephalic and atraumatic.   Eyes: Conjunctivae and EOM are normal. No scleral icterus.   Cardiovascular: Normal rate and regular rhythm. Exam reveals no gallop and no friction rub.   No murmur heard.  Pulmonary/Chest: Effort normal and breath sounds normal. No respiratory distress. She has no wheezes. She has no rales.   Abdominal: Soft. She exhibits no distension (Diffuse, mild) and no mass. There is tenderness. There is no rebound and no guarding.   Musculoskeletal: She exhibits no edema or tenderness.   Neurological: She is alert.   Skin: No rash noted. No erythema.   Psychiatric: She has a normal mood and affect. Her behavior is normal. Judgment and thought content normal.       RESULTS:   Lab Results   Component Value Date    WBC 7.22 08/07/2018    HGB 13.3 08/07/2018    HCT 42.0 08/07/2018    MCV 95 08/07/2018     08/07/2018     BMP  Lab Results   Component Value Date     08/07/2018    K 4.4 08/07/2018     08/07/2018    CO2 25 08/07/2018    BUN 13 08/07/2018    CREATININE 0.7 08/07/2018    CALCIUM 9.5 08/07/2018    ANIONGAP 11 08/07/2018    ESTGFRAFRICA >60.0 08/07/2018    EGFRNONAA >60.0 08/07/2018     Lab Results   Component Value Date    ALT 13 08/07/2018    AST 22 08/07/2018    ALKPHOS 70 08/07/2018    BILITOT 0.4 08/07/2018     Lab Results   Component Value Date    TSH 0.274 (L) 08/07/2018     Lab Results   Component Value Date    HGBA1C 7.2 (H) 08/07/2018         Assessment:     1. Essential hypertension    2. Hyperlipidemia, unspecified hyperlipidemia type    3. Irritable bowel syndrome with constipation    4. Subclinical hyperthyroidism    5. Dementia with behavioral disturbance, unspecified dementia type    6. Recurrent major depressive disorder, in full remission    7. Chronic neck pain    8. Chronic pain of right knee        Plan:     *Continue medication/plan as discussed in HPI except for changes discussed  below.    Zeina was seen today for follow-up.    Diagnoses and all orders for this visit:    Essential hypertension    Hyperlipidemia, unspecified hyperlipidemia type  -     Lipid panel; Future; Expected date: 08/13/2018    Irritable bowel syndrome with constipation   Discussed not using laxatives unless patient has gone 2-3 days without BM's.  Currently having BM almost every day without medication.  Encouraged patient to move around the house as much as possible.  Can use Gas-X PRN.    Subclinical hyperthyroidism   Repeat labs prior to next appointment.  -     TSH; Future; Expected date: 08/13/2018  -     T4, free; Future; Expected date: 08/13/2018    Dementia with behavioral disturbance, unspecified dementia type  Recurrent major depressive disorder, in full remission   Stable on current medications.    Chronic neck pain  -     Ambulatory referral to Home Health    Chronic pain of right knee  -     Ambulatory referral to Home Health    Health maintenance reviewed with patient.     Follow-up in about 3 months (around 11/13/2018).    Shahnaz Gupta MD  Internal Medicine  [facility]

## 2018-08-16 ENCOUNTER — TELEPHONE (OUTPATIENT)
Dept: INTERNAL MEDICINE | Facility: CLINIC | Age: 83
End: 2018-08-16

## 2018-08-16 NOTE — TELEPHONE ENCOUNTER
Spoke with Ricarda and she stated she was placing the pt's medication into her computer and a alert came up stating that the pt shouldn't be taking Amlodipine and Simivastatin together she would like to know the next steps to take for the pt.  Please advise

## 2018-08-16 NOTE — TELEPHONE ENCOUNTER
----- Message from Edgar Simmons sent at 8/16/2018 12:12 PM CDT -----  Contact: [facility]/Ricarda [phone]  She received a severity 1 interaction for amLODIPine (NORVASC) 5 MG tablet and the simvastatin (ZOCOR) 40 MG tablet.    Thank you

## 2018-08-22 ENCOUNTER — TELEPHONE (OUTPATIENT)
Dept: INTERNAL MEDICINE | Facility: CLINIC | Age: 83
End: 2018-08-22

## 2018-08-22 NOTE — TELEPHONE ENCOUNTER
----- Message from Christiane Bee sent at 8/22/2018 12:56 PM CDT -----  Contact: Lian/[facility]/[phone]  [facility] is reporting patient blood pressure is elevated .  This morning it was 192/90 and the patient was given tylenol, Patient blood pressue is coming down a little 186/84.  Patient is resting now after taking tylenol.    Please advise, thank you.

## 2018-08-23 ENCOUNTER — PATIENT MESSAGE (OUTPATIENT)
Dept: INTERNAL MEDICINE | Facility: CLINIC | Age: 83
End: 2018-08-23

## 2018-08-23 DIAGNOSIS — I10 ESSENTIAL HYPERTENSION: ICD-10-CM

## 2018-08-23 NOTE — TELEPHONE ENCOUNTER
Spoke with MsRanjithLian she stated when she saw the pt yesterday that the pt had a headache prior to when her blood pressure was taken after pt blood pressure was taken she had a elevated reading of 192/90. Pt was given Tylenol and her blood pressure was repeat shortly after her blood pressure reading was 186/84. Lian stated that she doesn't see the pt until tomorrow.   I spoke with pt's daughter Destiny she stated around 2:15/2:30pm she had the sitter repeat the pt's blood pressure the reading was 147/58.  was advised to have the sitter take the pt's blood pressure once the pt has started her day and to call with the readings.  understood and will call with blood pressure readings.

## 2018-08-24 ENCOUNTER — TELEPHONE (OUTPATIENT)
Dept: INTERNAL MEDICINE | Facility: CLINIC | Age: 83
End: 2018-08-24

## 2018-08-24 RX ORDER — AMLODIPINE BESYLATE 10 MG/1
10 TABLET ORAL DAILY
Qty: 90 TABLET | Refills: 3 | Status: SHIPPED | OUTPATIENT
Start: 2018-08-24 | End: 2018-10-04 | Stop reason: SDUPTHER

## 2018-08-24 NOTE — TELEPHONE ENCOUNTER
I would like her to increase Norvasc (amlodipine) to 10mg daily.  New prescription sent to the pharmacy.  Please tell daughter to give us a call early next week with updated blood pressures.  If patient develops persistent headache or any other neurologic symptoms over the weekend, she needs to come in for Urgent visit or go to the ER.

## 2018-08-24 NOTE — TELEPHONE ENCOUNTER
Spoke with pt's daughter she understood the pt medication has changed to Amlodipine 10MG once daily. She will start the pt on new medication and she will keep us updated on any new symptoms the pt come across but she will bring the pt in for a UC visit or to the ER of the pt start to experience any neurologic symptoms. She will have the pt's sitters take the pt's blood pressure readings over the next couple of days.

## 2018-08-24 NOTE — TELEPHONE ENCOUNTER
----- Message from Edgar Simmons sent at 8/24/2018  9:18 AM CDT -----  Contact: Daughter/ Alayna [phone]  Wednesday, August 23, 2018 --   12:10pm - June Cindy was given 2 Tylenol for a headache   12:30pm - Lian the PT took Mrs. MI's BP and it was 192/90. Mrs. MI was laying down to rest because she felt a little dizzy.   1:04  pm - Lian took Mrs. MI's BP again and it was 184/86. She stated that she had called and left a message  with Dr. Gupta's office.   2:37pm - Mrs. MI's sitter took her BP and it was 147/58, Pulse 69.   Thursday, August 24, 2018 --   8:55am - Sitbrandyn took Mrs. MI's BP after breakfast and morning meds.  144/60, Pulse 63   12:10pm - Sitter took BP 10 min. after waking from nap and before eating lunch, 180/83, Pulse 66 2:47pm - Sitter took /86, Pulse 82   5:00pm - Sitter took /80     Do you think her BP meds need to be adjusted?     Thanks, Destiny Mak     See the email she sent via my [facility]

## 2018-08-28 ENCOUNTER — PATIENT MESSAGE (OUTPATIENT)
Dept: INTERNAL MEDICINE | Facility: CLINIC | Age: 83
End: 2018-08-28

## 2018-08-30 RX ORDER — METFORMIN HYDROCHLORIDE 500 MG/1
TABLET ORAL
Qty: 180 TABLET | Refills: 3 | Status: SHIPPED | OUTPATIENT
Start: 2018-08-30 | End: 2019-08-20 | Stop reason: SDUPTHER

## 2018-09-04 NOTE — TRANSFER OF CARE
Anesthesia Transfer of Care Note    Patient: June T Estopinal    Procedure(s) Performed: Procedure(s) (LRB):  IM NAILING OF FEMUR (Left)    Patient location: PACU    Anesthesia Type: general    Transport from OR: Transported from OR on 6-10 L/min O2 by face mask with adequate spontaneous ventilation    Post pain: adequate analgesia    Post assessment: no apparent anesthetic complications    Post vital signs: stable    Level of consciousness: confused    Nausea/Vomiting: no nausea/vomiting    Complications: none          Last vitals:   Visit Vitals    BP (!) 189/87    Pulse 92    Temp 37.2 °C (98.9 °F) (Oral)    Resp 16    Wt 68 kg (149 lb 14.6 oz)    SpO2 98%    Breastfeeding No    BMI 28.33 kg/m2      Normal vision: sees adequately in most situations; can see medication labels, newsprint

## 2018-09-18 RX ORDER — LISINOPRIL 40 MG/1
TABLET ORAL
Qty: 90 TABLET | Refills: 3 | Status: SHIPPED | OUTPATIENT
Start: 2018-09-18 | End: 2019-09-22 | Stop reason: SDUPTHER

## 2018-09-20 ENCOUNTER — PES CALL (OUTPATIENT)
Dept: ADMINISTRATIVE | Facility: CLINIC | Age: 83
End: 2018-09-20

## 2018-10-01 ENCOUNTER — TELEPHONE (OUTPATIENT)
Dept: INTERNAL MEDICINE | Facility: CLINIC | Age: 83
End: 2018-10-01

## 2018-10-01 NOTE — TELEPHONE ENCOUNTER
----- Message from Christiane Bee sent at 10/1/2018 10:16 AM CDT -----  Contact: [phone]/ Destiny/ Daughter  Patient's daughter is checking the status of a fax she sent over in regards to patient's blood pressure.    Please advise, thank you

## 2018-10-03 DIAGNOSIS — I10 ESSENTIAL HYPERTENSION: ICD-10-CM

## 2018-10-03 NOTE — TELEPHONE ENCOUNTER
----- Message from Lore Patten sent at 10/3/2018  2:47 PM CDT -----  Contact: Destiny daughter  [phone]  Please call daughter to confirm that you received the fax that she sent on Monday 10/01/18.

## 2018-10-03 NOTE — TELEPHONE ENCOUNTER
Spoke with pt's daughter she understood that we did receive the pt's blood pressure readings via fax she would like if you can go over pt's bp readings

## 2018-10-04 NOTE — TELEPHONE ENCOUNTER
Spoke with pt's daughter she she will continue pt to take current medications and will keep log of pt's b/p will bring b/p log and cuff at next appt pt needs a refill on medication.  Please advise

## 2018-10-04 NOTE — TELEPHONE ENCOUNTER
Tell her to keep medications the same.  She is already on max dose of 2 blood pressure medications.  We can discuss pros and cons of starting an additional medication at next appointment.  Tell them to bring blood pressure log and cuff to next appointment.

## 2018-10-05 RX ORDER — AMLODIPINE BESYLATE 5 MG/1
5 TABLET ORAL 2 TIMES DAILY
Qty: 180 TABLET | Refills: 3 | Status: SHIPPED | OUTPATIENT
Start: 2018-10-05 | End: 2019-09-30 | Stop reason: SDUPTHER

## 2018-10-05 RX ORDER — AMLODIPINE BESYLATE 10 MG/1
10 TABLET ORAL DAILY
Qty: 90 TABLET | Refills: 3 | Status: SHIPPED | OUTPATIENT
Start: 2018-10-05 | End: 2018-10-05

## 2018-10-05 NOTE — TELEPHONE ENCOUNTER
Spoke with pt's daughter she stated that you changed pt's Amlodipine from 10MG once daily to 5MG twice daily

## 2018-11-07 ENCOUNTER — LAB VISIT (OUTPATIENT)
Dept: LAB | Facility: HOSPITAL | Age: 83
End: 2018-11-07
Attending: INTERNAL MEDICINE
Payer: MEDICARE

## 2018-11-07 DIAGNOSIS — E78.5 HYPERLIPIDEMIA, UNSPECIFIED HYPERLIPIDEMIA TYPE: ICD-10-CM

## 2018-11-07 DIAGNOSIS — E05.90 SUBCLINICAL HYPERTHYROIDISM: ICD-10-CM

## 2018-11-07 LAB
CHOLEST SERPL-MCNC: 157 MG/DL
CHOLEST/HDLC SERPL: 3.9 {RATIO}
HDLC SERPL-MCNC: 40 MG/DL
HDLC SERPL: 25.5 %
LDLC SERPL CALC-MCNC: 91.8 MG/DL
NONHDLC SERPL-MCNC: 117 MG/DL
T4 FREE SERPL-MCNC: 0.95 NG/DL
TRIGL SERPL-MCNC: 126 MG/DL
TSH SERPL DL<=0.005 MIU/L-ACNC: 1.59 UIU/ML

## 2018-11-07 PROCEDURE — 84443 ASSAY THYROID STIM HORMONE: CPT | Mod: HCNC

## 2018-11-07 PROCEDURE — 36415 COLL VENOUS BLD VENIPUNCTURE: CPT | Mod: HCNC,PO

## 2018-11-07 PROCEDURE — 80061 LIPID PANEL: CPT | Mod: HCNC

## 2018-11-07 PROCEDURE — 84439 ASSAY OF FREE THYROXINE: CPT | Mod: HCNC

## 2018-11-09 DIAGNOSIS — F03.91 DEMENTIA WITH BEHAVIORAL DISTURBANCE, UNSPECIFIED DEMENTIA TYPE: ICD-10-CM

## 2018-11-09 RX ORDER — QUETIAPINE FUMARATE 25 MG/1
TABLET, FILM COATED ORAL
Qty: 405 TABLET | Refills: 0 | Status: SHIPPED | OUTPATIENT
Start: 2018-11-09 | End: 2019-02-11 | Stop reason: SDUPTHER

## 2018-11-15 ENCOUNTER — TELEPHONE (OUTPATIENT)
Dept: INTERNAL MEDICINE | Facility: CLINIC | Age: 83
End: 2018-11-15

## 2018-11-15 ENCOUNTER — LAB VISIT (OUTPATIENT)
Dept: LAB | Facility: HOSPITAL | Age: 83
End: 2018-11-15
Attending: INTERNAL MEDICINE
Payer: MEDICARE

## 2018-11-15 ENCOUNTER — OFFICE VISIT (OUTPATIENT)
Dept: INTERNAL MEDICINE | Facility: CLINIC | Age: 83
End: 2018-11-15
Payer: MEDICARE

## 2018-11-15 ENCOUNTER — IMMUNIZATION (OUTPATIENT)
Dept: INTERNAL MEDICINE | Facility: CLINIC | Age: 83
End: 2018-11-15
Payer: MEDICARE

## 2018-11-15 VITALS
SYSTOLIC BLOOD PRESSURE: 142 MMHG | DIASTOLIC BLOOD PRESSURE: 60 MMHG | BODY MASS INDEX: 32.09 KG/M2 | HEIGHT: 56 IN | HEART RATE: 66 BPM | WEIGHT: 142.63 LBS

## 2018-11-15 DIAGNOSIS — K58.1 IRRITABLE BOWEL SYNDROME WITH CONSTIPATION: ICD-10-CM

## 2018-11-15 DIAGNOSIS — M81.0 OSTEOPOROSIS, UNSPECIFIED OSTEOPOROSIS TYPE, UNSPECIFIED PATHOLOGICAL FRACTURE PRESENCE: ICD-10-CM

## 2018-11-15 DIAGNOSIS — F33.42 RECURRENT MAJOR DEPRESSIVE DISORDER, IN FULL REMISSION: ICD-10-CM

## 2018-11-15 DIAGNOSIS — I10 ESSENTIAL HYPERTENSION: ICD-10-CM

## 2018-11-15 DIAGNOSIS — K21.9 GASTROESOPHAGEAL REFLUX DISEASE, ESOPHAGITIS PRESENCE NOT SPECIFIED: ICD-10-CM

## 2018-11-15 DIAGNOSIS — E53.8 LOW SERUM VITAMIN B12: ICD-10-CM

## 2018-11-15 DIAGNOSIS — F03.91 DEMENTIA WITH BEHAVIORAL DISTURBANCE, UNSPECIFIED DEMENTIA TYPE: ICD-10-CM

## 2018-11-15 DIAGNOSIS — R42 VERTIGO: ICD-10-CM

## 2018-11-15 DIAGNOSIS — Q99.2 FRAGILE X SYNDROME: ICD-10-CM

## 2018-11-15 DIAGNOSIS — R60.0 LOWER EXTREMITY EDEMA: Primary | ICD-10-CM

## 2018-11-15 DIAGNOSIS — G47.00 INSOMNIA, UNSPECIFIED TYPE: ICD-10-CM

## 2018-11-15 DIAGNOSIS — E11.49 TYPE II DIABETES MELLITUS WITH NEUROLOGICAL MANIFESTATIONS: ICD-10-CM

## 2018-11-15 DIAGNOSIS — M17.0 PRIMARY OSTEOARTHRITIS OF BOTH KNEES: ICD-10-CM

## 2018-11-15 DIAGNOSIS — R60.0 LOWER EXTREMITY EDEMA: ICD-10-CM

## 2018-11-15 LAB
ALBUMIN SERPL BCP-MCNC: 4 G/DL
ALP SERPL-CCNC: 64 U/L
ALT SERPL W/O P-5'-P-CCNC: 16 U/L
ANION GAP SERPL CALC-SCNC: 9 MMOL/L
AST SERPL-CCNC: 16 U/L
BILIRUB SERPL-MCNC: 0.3 MG/DL
BNP SERPL-MCNC: 53 PG/ML
BUN SERPL-MCNC: 15 MG/DL
CALCIUM SERPL-MCNC: 9.8 MG/DL
CHLORIDE SERPL-SCNC: 106 MMOL/L
CO2 SERPL-SCNC: 26 MMOL/L
CREAT SERPL-MCNC: 0.7 MG/DL
EST. GFR  (AFRICAN AMERICAN): >60 ML/MIN/1.73 M^2
EST. GFR  (NON AFRICAN AMERICAN): >60 ML/MIN/1.73 M^2
GLUCOSE SERPL-MCNC: 173 MG/DL
POTASSIUM SERPL-SCNC: 3.8 MMOL/L
PROT SERPL-MCNC: 7 G/DL
SODIUM SERPL-SCNC: 141 MMOL/L

## 2018-11-15 PROCEDURE — 99999 PR PBB SHADOW E&M-EST. PATIENT-LVL III: CPT | Mod: PBBFAC,HCNC,, | Performed by: INTERNAL MEDICINE

## 2018-11-15 PROCEDURE — 36415 COLL VENOUS BLD VENIPUNCTURE: CPT | Mod: HCNC

## 2018-11-15 PROCEDURE — 99214 OFFICE O/P EST MOD 30 MIN: CPT | Mod: 25,HCNC,S$GLB, | Performed by: INTERNAL MEDICINE

## 2018-11-15 PROCEDURE — G0008 ADMIN INFLUENZA VIRUS VAC: HCPCS | Mod: HCNC,S$GLB,, | Performed by: INTERNAL MEDICINE

## 2018-11-15 PROCEDURE — 83880 ASSAY OF NATRIURETIC PEPTIDE: CPT | Mod: HCNC

## 2018-11-15 PROCEDURE — 80053 COMPREHEN METABOLIC PANEL: CPT | Mod: HCNC

## 2018-11-15 PROCEDURE — 90662 IIV NO PRSV INCREASED AG IM: CPT | Mod: HCNC,S$GLB,, | Performed by: INTERNAL MEDICINE

## 2018-11-15 PROCEDURE — 99499 UNLISTED E&M SERVICE: CPT | Mod: HCNC,S$GLB,, | Performed by: INTERNAL MEDICINE

## 2018-11-15 PROCEDURE — 1101F PT FALLS ASSESS-DOCD LE1/YR: CPT | Mod: CPTII,HCNC,S$GLB, | Performed by: INTERNAL MEDICINE

## 2018-11-15 NOTE — PROGRESS NOTES
Internal Medicine    Subjective:      Patient ID: Zeina White is a 89 y.o. female.    Chief Complaint: Follow-up    HPI:  Patient presents for follow up appointment.  The patient's last visit with me was on 8/13/2018.    LE edema:  Has been present for the last few months.  Denies chest pain, shortness of breath, palpitations, diet changes, or decreased urination.  She is seated for much of the day, however this has not changed.  Norvasc was increased to 10mg daily since last appointment.    Bilateral knee OA (R>L):  Saw MAGDALENA Howell on 4/13/18.  Given steroid injection.  Also using Voltaren gel at home and taking ibuprofen and Tylenol alternating as needed (not often).       IBS, predominantly constipation:  Now using Colace as needed and avoiding frequent use of laxatives.  Per daughter, BM's have improved.       Vertigo:  Still occurs from time to time.  Occasional dizziness after bathing recently.  Takes meclizine PRN which is helpful.       Dementia with behavioral disturbance, depression:  Patient has 24/7 sitters.  Followed by Neurology (Dr. Churchill) - last seen 12/20/17.  Taking Aricept 5mg qHS, Zoloft 25mg daily, and Seroquel 50mg at 3pm + 62.5mg at 6pm.      HTN:  Norvasc increased to 10mg daily after last appointment.  Continued on lisinopril 40mg daily.  BP running 120-150's/50-60's most of the time with occasional 160-170's systolic.     HLD:  Taking simvistatin 40mg daily.  Denies side effects.     DM2:  Taking metformin 500mg BID.  Denies side effects.  Last HgA1c 7.2 on 8/7/18.  FSG running 120-150's per glucose log.        GERD:  Takes Zantac 150mg BID.        Osteoporosis:  Last DEXA in 2009 showing osteoporosis of hip, recommendation was repeat in 2 years.  Family has declined repeat DEXA, treatment for osteoporosis, and Endocrine referral.     Vit B12:  Borderline low at 221 on 8/1/17.  Taking B12 1000mcg daily.  Repeat B12 651 on 2/8/18.     Macular degeneration:  Sees Ophthalmology.  " Taking Preservision.       Fragile X Syndrome.       Review of Systems   Constitutional: Negative for appetite change, chills, fatigue, fever and unexpected weight change.   HENT: Positive for hearing loss. Negative for sore throat and trouble swallowing.    Eyes: Negative for visual disturbance.   Respiratory: Negative for cough, chest tightness, shortness of breath and wheezing.    Cardiovascular: Positive for leg swelling. Negative for chest pain and palpitations.   Gastrointestinal: Negative for abdominal pain, blood in stool, constipation, diarrhea, nausea and vomiting.   Genitourinary: Negative for difficulty urinating.   Musculoskeletal: Positive for arthralgias. Negative for myalgias.   Skin: Negative for rash and wound.   Neurological: Positive for dizziness (Occasional) and weakness (Chronic). Negative for numbness and headaches.   Psychiatric/Behavioral: Negative for behavioral problems, confusion and dysphoric mood. The patient is not nervous/anxious.        Past medical history, surgical history, and family medical history reviewed and updated as appropriate.    Medications and allergies reviewed.     Objective:     Vitals:    11/15/18 1141   BP: (!) 142/60   Pulse: 66   Weight: 64.7 kg (142 lb 10.2 oz)   Height: 4' 8" (1.422 m)     Physical Exam   Constitutional: She is oriented to person, place, and time. She appears well-developed and well-nourished. No distress.   Sitting in wheelchair.   HENT:   Head: Normocephalic and atraumatic.   Eyes: Conjunctivae and EOM are normal. No scleral icterus.   Cardiovascular: Normal rate and regular rhythm. Exam reveals no gallop and no friction rub.   No murmur heard.  Pulmonary/Chest: Effort normal and breath sounds normal. No respiratory distress. She has no wheezes. She has no rales.   Abdominal: Soft. She exhibits no distension.   Musculoskeletal: She exhibits edema (1+ pitting edema to mid leg bilaterally.). She exhibits no tenderness.   Neurological: She is " alert and oriented to person, place, and time.   Skin: No rash noted. No erythema.   Psychiatric: She has a normal mood and affect. Her behavior is normal.       RESULTS:   Lab Results   Component Value Date    WBC 7.22 08/07/2018    HGB 13.3 08/07/2018    HCT 42.0 08/07/2018    MCV 95 08/07/2018     08/07/2018     BMP  Lab Results   Component Value Date     11/15/2018    K 3.8 11/15/2018     11/15/2018    CO2 26 11/15/2018    BUN 15 11/15/2018    CREATININE 0.7 11/15/2018    CALCIUM 9.8 11/15/2018    ANIONGAP 9 11/15/2018    ESTGFRAFRICA >60 11/15/2018    EGFRNONAA >60 11/15/2018     Lab Results   Component Value Date    ALT 16 11/15/2018    AST 16 11/15/2018    ALKPHOS 64 11/15/2018    BILITOT 0.3 11/15/2018     Lab Results   Component Value Date    TSH 1.593 11/07/2018     Lab Results   Component Value Date    HGBA1C 7.2 (H) 08/07/2018         Assessment:     1. Lower extremity edema    2. Essential hypertension    3. Type II diabetes mellitus with neurological manifestations    4. Vertigo    5. Dementia with behavioral disturbance, unspecified dementia type    6. Primary osteoarthritis of both knees    7. Osteoporosis, unspecified osteoporosis type, unspecified pathological fracture presence    8. Low serum vitamin B12    9. Irritable bowel syndrome with constipation    10. Recurrent major depressive disorder, in full remission    11. Insomnia, unspecified type    12. Gastroesophageal reflux disease, esophagitis presence not specified    13. Fragile X syndrome        Plan:     *Continue medication/plan as discussed in HPI except for changes discussed below.    June was seen today for follow-up.    Diagnoses and all orders for this visit:    Lower extremity    Etiology remains unclear.  Could be dependent edema from sitting all day, however this has not changed in the last few months.  Could be from increase in Norvasc dose.  Will check BNP and CMP for evidence of new heart failure or kidney  injury.  If labs normal, will consider decreasing or stopping Norvasc and changing to another agent.  Recommend compression stockings for now.  -     Brain natriuretic peptide; Future; Expected date: 11/15/2018  -     Comprehensive metabolic panel; Future; Expected date: 11/15/2018    Essential hypertension   As above.  Will first get labs and then make decision regarding medication change.    Type II diabetes mellitus with neurological manifestations    Vertigo    Dementia with behavioral disturbance, unspecified dementia type    Primary osteoarthritis of both knees    Osteoporosis, unspecified osteoporosis type, unspecified pathological fracture presence    Low serum vitamin B12    Irritable bowel syndrome with constipation    Recurrent major depressive disorder, in full remission  Insomnia, unspecified type    Gastroesophageal reflux disease, esophagitis presence not specified    Fragile X syndrome    Health maintenance reviewed with patient.     Follow-up in about 3 months (around 2/15/2019).    Shahnaz Gupta MD  Internal Medicine  [facility]

## 2018-11-15 NOTE — TELEPHONE ENCOUNTER
"----- Message from Don Ojeda sent at 11/15/2018  3:37 PM CST -----  Contact: Daughter Destiny [phone]  Patient daughter calling, stating patient left a "Black and Grey" scarf after leaving Dr wise today, would like office to give a call to inform if has been found.    Please call an advise  Thank you    "

## 2018-11-15 NOTE — TELEPHONE ENCOUNTER
Spoke with  Pt's daughter to advise her that I didn't see anything left behind once I cleaned the exam room they where in daughter stated that it would've been in the waiting area I will give her a call if someone turns it into lost and found

## 2018-12-10 DIAGNOSIS — M17.0 PRIMARY OSTEOARTHRITIS OF BOTH KNEES: ICD-10-CM

## 2018-12-10 RX ORDER — DICLOFENAC SODIUM 10 MG/G
GEL TOPICAL
Qty: 100 G | Refills: 11 | Status: SHIPPED | OUTPATIENT
Start: 2018-12-10 | End: 2019-07-18

## 2019-02-10 DIAGNOSIS — F03.91 DEMENTIA WITH BEHAVIORAL DISTURBANCE, UNSPECIFIED DEMENTIA TYPE: ICD-10-CM

## 2019-02-10 RX ORDER — SERTRALINE HYDROCHLORIDE 25 MG/1
TABLET, FILM COATED ORAL
Qty: 90 TABLET | Refills: 0 | Status: CANCELLED | OUTPATIENT
Start: 2019-02-10

## 2019-02-10 RX ORDER — QUETIAPINE FUMARATE 25 MG/1
TABLET, FILM COATED ORAL
Qty: 405 TABLET | Refills: 0 | Status: CANCELLED | OUTPATIENT
Start: 2019-02-10

## 2019-02-11 ENCOUNTER — OFFICE VISIT (OUTPATIENT)
Dept: INTERNAL MEDICINE | Facility: CLINIC | Age: 84
End: 2019-02-11
Payer: MEDICARE

## 2019-02-11 ENCOUNTER — LAB VISIT (OUTPATIENT)
Dept: LAB | Facility: HOSPITAL | Age: 84
End: 2019-02-11
Attending: INTERNAL MEDICINE
Payer: MEDICARE

## 2019-02-11 VITALS
BODY MASS INDEX: 32.57 KG/M2 | WEIGHT: 145.31 LBS | SYSTOLIC BLOOD PRESSURE: 146 MMHG | DIASTOLIC BLOOD PRESSURE: 62 MMHG | HEART RATE: 81 BPM | OXYGEN SATURATION: 97 %

## 2019-02-11 DIAGNOSIS — G47.00 INSOMNIA, UNSPECIFIED TYPE: ICD-10-CM

## 2019-02-11 DIAGNOSIS — E78.5 HYPERLIPIDEMIA, UNSPECIFIED HYPERLIPIDEMIA TYPE: ICD-10-CM

## 2019-02-11 DIAGNOSIS — G25.0 TREMOR, ESSENTIAL: ICD-10-CM

## 2019-02-11 DIAGNOSIS — I73.9 PERIPHERAL VASCULAR DISEASE: ICD-10-CM

## 2019-02-11 DIAGNOSIS — E53.8 B12 DEFICIENCY: ICD-10-CM

## 2019-02-11 DIAGNOSIS — R60.0 BILATERAL LOWER EXTREMITY EDEMA: ICD-10-CM

## 2019-02-11 DIAGNOSIS — K58.1 IRRITABLE BOWEL SYNDROME WITH CONSTIPATION: ICD-10-CM

## 2019-02-11 DIAGNOSIS — E11.49 TYPE II DIABETES MELLITUS WITH NEUROLOGICAL MANIFESTATIONS: ICD-10-CM

## 2019-02-11 DIAGNOSIS — M81.0 OSTEOPOROSIS, UNSPECIFIED OSTEOPOROSIS TYPE, UNSPECIFIED PATHOLOGICAL FRACTURE PRESENCE: ICD-10-CM

## 2019-02-11 DIAGNOSIS — F03.91 DEMENTIA WITH BEHAVIORAL DISTURBANCE, UNSPECIFIED DEMENTIA TYPE: ICD-10-CM

## 2019-02-11 DIAGNOSIS — R42 DIZZINESS: ICD-10-CM

## 2019-02-11 DIAGNOSIS — I10 ESSENTIAL HYPERTENSION: ICD-10-CM

## 2019-02-11 DIAGNOSIS — H91.93 BILATERAL HEARING LOSS, UNSPECIFIED HEARING LOSS TYPE: ICD-10-CM

## 2019-02-11 DIAGNOSIS — R42 DIZZINESS: Primary | ICD-10-CM

## 2019-02-11 DIAGNOSIS — Q99.2 FRAGILE X SYNDROME: ICD-10-CM

## 2019-02-11 DIAGNOSIS — F33.42 RECURRENT MAJOR DEPRESSIVE DISORDER, IN FULL REMISSION: ICD-10-CM

## 2019-02-11 DIAGNOSIS — K21.9 GASTROESOPHAGEAL REFLUX DISEASE, ESOPHAGITIS PRESENCE NOT SPECIFIED: ICD-10-CM

## 2019-02-11 LAB
ALBUMIN SERPL BCP-MCNC: 4.3 G/DL
ALP SERPL-CCNC: 66 U/L
ALT SERPL W/O P-5'-P-CCNC: 17 U/L
ANION GAP SERPL CALC-SCNC: 12 MMOL/L
AST SERPL-CCNC: 18 U/L
BASOPHILS # BLD AUTO: 0.05 K/UL
BASOPHILS NFR BLD: 0.6 %
BILIRUB SERPL-MCNC: 0.4 MG/DL
BUN SERPL-MCNC: 14 MG/DL
CALCIUM SERPL-MCNC: 10.2 MG/DL
CHLORIDE SERPL-SCNC: 105 MMOL/L
CO2 SERPL-SCNC: 27 MMOL/L
CREAT SERPL-MCNC: 0.7 MG/DL
DIFFERENTIAL METHOD: ABNORMAL
EOSINOPHIL # BLD AUTO: 0.3 K/UL
EOSINOPHIL NFR BLD: 3.8 %
ERYTHROCYTE [DISTWIDTH] IN BLOOD BY AUTOMATED COUNT: 14.3 %
EST. GFR  (AFRICAN AMERICAN): >60 ML/MIN/1.73 M^2
EST. GFR  (NON AFRICAN AMERICAN): >60 ML/MIN/1.73 M^2
GLUCOSE SERPL-MCNC: 138 MG/DL
HCT VFR BLD AUTO: 44.2 %
HGB BLD-MCNC: 14 G/DL
LYMPHOCYTES # BLD AUTO: 2 K/UL
LYMPHOCYTES NFR BLD: 24 %
MCH RBC QN AUTO: 28.8 PG
MCHC RBC AUTO-ENTMCNC: 31.7 G/DL
MCV RBC AUTO: 91 FL
MONOCYTES # BLD AUTO: 0.7 K/UL
MONOCYTES NFR BLD: 7.6 %
NEUTROPHILS # BLD AUTO: 5.4 K/UL
NEUTROPHILS NFR BLD: 63.8 %
PLATELET # BLD AUTO: 205 K/UL
PMV BLD AUTO: 11.9 FL
POTASSIUM SERPL-SCNC: 3.7 MMOL/L
PROT SERPL-MCNC: 7.5 G/DL
RBC # BLD AUTO: 4.86 M/UL
SODIUM SERPL-SCNC: 144 MMOL/L
VIT B12 SERPL-MCNC: 483 PG/ML
WBC # BLD AUTO: 8.5 K/UL

## 2019-02-11 PROCEDURE — 99499 UNLISTED E&M SERVICE: CPT | Mod: HCNC,S$GLB,, | Performed by: INTERNAL MEDICINE

## 2019-02-11 PROCEDURE — 1101F PR PT FALLS ASSESS DOC 0-1 FALLS W/OUT INJ PAST YR: ICD-10-PCS | Mod: HCNC,CPTII,S$GLB, | Performed by: INTERNAL MEDICINE

## 2019-02-11 PROCEDURE — 80053 COMPREHEN METABOLIC PANEL: CPT | Mod: HCNC

## 2019-02-11 PROCEDURE — 99499 RISK ADDL DX/OHS AUDIT: ICD-10-PCS | Mod: HCNC,S$GLB,, | Performed by: INTERNAL MEDICINE

## 2019-02-11 PROCEDURE — 82607 VITAMIN B-12: CPT | Mod: HCNC

## 2019-02-11 PROCEDURE — 99214 PR OFFICE/OUTPT VISIT, EST, LEVL IV, 30-39 MIN: ICD-10-PCS | Mod: HCNC,S$GLB,, | Performed by: INTERNAL MEDICINE

## 2019-02-11 PROCEDURE — 85025 COMPLETE CBC W/AUTO DIFF WBC: CPT | Mod: HCNC

## 2019-02-11 PROCEDURE — 1101F PT FALLS ASSESS-DOCD LE1/YR: CPT | Mod: HCNC,CPTII,S$GLB, | Performed by: INTERNAL MEDICINE

## 2019-02-11 PROCEDURE — 99999 PR PBB SHADOW E&M-EST. PATIENT-LVL III: CPT | Mod: PBBFAC,HCNC,, | Performed by: INTERNAL MEDICINE

## 2019-02-11 PROCEDURE — 36415 COLL VENOUS BLD VENIPUNCTURE: CPT | Mod: HCNC

## 2019-02-11 PROCEDURE — 99999 PR PBB SHADOW E&M-EST. PATIENT-LVL III: ICD-10-PCS | Mod: PBBFAC,HCNC,, | Performed by: INTERNAL MEDICINE

## 2019-02-11 PROCEDURE — 83036 HEMOGLOBIN GLYCOSYLATED A1C: CPT | Mod: HCNC

## 2019-02-11 PROCEDURE — 99214 OFFICE O/P EST MOD 30 MIN: CPT | Mod: HCNC,S$GLB,, | Performed by: INTERNAL MEDICINE

## 2019-02-11 RX ORDER — SERTRALINE HYDROCHLORIDE 25 MG/1
25 TABLET, FILM COATED ORAL DAILY
Qty: 90 TABLET | Refills: 3 | Status: SHIPPED | OUTPATIENT
Start: 2019-02-11 | End: 2020-02-03 | Stop reason: SDUPTHER

## 2019-02-11 RX ORDER — QUETIAPINE FUMARATE 25 MG/1
TABLET, FILM COATED ORAL
Qty: 405 TABLET | Refills: 3 | Status: SHIPPED | OUTPATIENT
Start: 2019-02-11 | End: 2020-02-12

## 2019-02-11 NOTE — PROGRESS NOTES
Internal Medicine    Subjective:      Patient ID: Zeina White is a 89 y.o. female.    Chief Complaint: Follow-up    HPI:  Patient presents for follow up appointment.  The patient's last visit with me was on 11/15/2018.    Dizziness, h/o vertigo:  Has been having dizziness in the mornings.  Usually after BM's but patient says this is not always the case.  Says the feeling is in her head.  Denies seeing black spots although does have a history of syncope for most of her life.  Does not occur the rest of the day.  Resolves with lying down.  Patient unable to say whether room is actually spinning or not.  Does also have a history of vertigo.      LE edema:  Possibly due to Norvasc increase.  BNP and CMP normal at last appointment.  Has improved since last appointment.  Has not yet bought compression stockings.     HTN:  Taking Norvasc 10mg daily and lisinopril 40mg daily.  BP occasionally still in 140's.  Discussed with patient and family possible increased risk of lung cancer with lisinopril based on one study.  They would like to continue current medications.    HLD:  Taking simvistatin 40mg daily.  Denies side effects.     DM2:  Taking metformin 500mg BID.  Last HgA1c 7.2 on 8/7/18.      Bilateral knee OA (R>L):  Saw MAGDALENA Howell on 4/13/18.  Given steroid injection.  Also using Voltaren gel at home and taking ibuprofen and Tylenol alternating as needed (not often).       IBS, predominantly constipation:  Now using Colace as needed and avoiding frequent use of laxatives.      Dementia with behavioral disturbance, depression:  Patient has 24/7 sitters.  Followed by Neurology (Dr. Churchill) - last seen 12/20/17.  Taking Aricept 5mg qHS, Zoloft 25mg daily, and Seroquel 50mg at 3pm + 62.5mg at 6pm.      GERD:  Takes Zantac 150mg BID.        Osteoporosis:  Last DEXA in 2009 showing osteoporosis of hip, recommendation was repeat in 2 years.  Family has declined repeat DEXA, treatment for osteoporosis, and Endocrine  referral.     Vit B12:  Borderline low at 221 on 8/1/17.  Patient then started taking B12 1000mcg daily and repeat B12 651 on 2/8/18.  B12 dose reduced to 500mcg daily.     Macular degeneration:  Sees Ophthalmology.  Taking Preservision.       Fragile X Syndrome.         Review of Systems   Constitutional: Negative for appetite change, chills, fatigue, fever and unexpected weight change.   HENT: Negative for sore throat and trouble swallowing.    Eyes: Negative for visual disturbance.   Respiratory: Negative for cough, chest tightness, shortness of breath and wheezing.    Cardiovascular: Negative for chest pain, palpitations and leg swelling.   Gastrointestinal: Negative for abdominal pain, blood in stool, constipation, diarrhea, nausea and vomiting.   Genitourinary: Negative for difficulty urinating.   Musculoskeletal: Positive for arthralgias. Negative for myalgias.   Skin: Negative for rash and wound.   Neurological: Positive for dizziness. Negative for weakness, numbness and headaches.   Psychiatric/Behavioral: Negative for behavioral problems and confusion.       Past medical history, surgical history, and family medical history reviewed and updated as appropriate.    Medications and allergies reviewed.     Objective:     Vitals:    02/11/19 1109   BP: (!) 146/62   BP Location: Right arm   Patient Position: Sitting   BP Method: Medium (Manual)   Pulse: 81   SpO2: 97%   Weight: 65.9 kg (145 lb 4.5 oz)     Physical Exam   Constitutional: She appears well-developed and well-nourished. No distress.   Sitting in wheelchair   HENT:   Head: Normocephalic and atraumatic.   Decreased hearing   Eyes: Conjunctivae and EOM are normal. No scleral icterus.   Cardiovascular: Normal rate and regular rhythm. Exam reveals no gallop and no friction rub.   No murmur heard.  Pulmonary/Chest: Effort normal and breath sounds normal. No respiratory distress. She has no wheezes. She has no rales.   Abdominal: Soft. She exhibits no  distension.   Musculoskeletal: She exhibits no edema or tenderness.   Neurological: She is alert.   Skin: No rash noted. No erythema.   Psychiatric: She has a normal mood and affect. Her behavior is normal.       RESULTS:   Lab Results   Component Value Date    WBC 7.22 08/07/2018    HGB 13.3 08/07/2018    HCT 42.0 08/07/2018    MCV 95 08/07/2018     08/07/2018     BMP  Lab Results   Component Value Date     11/15/2018    K 3.8 11/15/2018     11/15/2018    CO2 26 11/15/2018    BUN 15 11/15/2018    CREATININE 0.7 11/15/2018    CALCIUM 9.8 11/15/2018    ANIONGAP 9 11/15/2018    ESTGFRAFRICA >60 11/15/2018    EGFRNONAA >60 11/15/2018     Lab Results   Component Value Date    ALT 16 11/15/2018    AST 16 11/15/2018    ALKPHOS 64 11/15/2018    BILITOT 0.3 11/15/2018     Lab Results   Component Value Date    TSH 1.593 11/07/2018     Lab Results   Component Value Date    HGBA1C 7.2 (H) 08/07/2018         Assessment:     1. Dizziness    2. Bilateral lower extremity edema    3. Peripheral vascular disease    4. Essential hypertension    5. Hyperlipidemia, unspecified hyperlipidemia type    6. Type II diabetes mellitus with neurological manifestations    7. Dementia with behavioral disturbance, unspecified dementia type    8. B12 deficiency    9. Recurrent major depressive disorder, in full remission    10. Insomnia, unspecified type    11. Tremor, essential    12. Bilateral hearing loss, unspecified hearing loss type    13. Gastroesophageal reflux disease, esophagitis presence not specified    14. Irritable bowel syndrome with constipation    15. Osteoporosis, unspecified osteoporosis type, unspecified pathological fracture presence    16. Fragile X syndrome        Plan:     *Continue medication/plan as discussed in HPI except for changes discussed below.    Zeina was seen today for follow-up.    Diagnoses and all orders for this visit:    Dizziness   Likely vasovagal per history.    -     CBC auto  differential; Future; Expected date: 02/11/2019  -     Comprehensive metabolic panel; Future; Expected date: 02/11/2019    Bilateral lower extremity edema   Improved since last appointment.  Family also plans to buy compression stockings.    Peripheral vascular disease    Essential hypertension   Permissive hypertension due to age and dizziness.    Hyperlipidemia, unspecified hyperlipidemia type    Type II diabetes mellitus with neurological manifestations  -     Comprehensive metabolic panel; Future; Expected date: 02/11/2019  -     Hemoglobin A1c; Future; Expected date: 02/11/2019    Dementia with behavioral disturbance, unspecified dementia type  -     sertraline (ZOLOFT) 25 MG tablet; Take 1 tablet (25 mg total) by mouth once daily.  -     QUEtiapine (SEROQUEL) 25 MG Tab; TAKE 2 TABLETS BY MOUTH EVERY DAY AT 3PM AND 2 AND 1/2 TABLETS BY MOUTH EVERY EVENING    B12 deficiency  -     Vitamin B12; Future; Expected date: 02/11/2019    Recurrent major depressive disorder, in full remission  Insomnia, unspecified type    Tremor, essential    Bilateral hearing loss, unspecified hearing loss type    Gastroesophageal reflux disease, esophagitis presence not specified  Irritable bowel syndrome with constipation    Osteoporosis, unspecified osteoporosis type, unspecified pathological fracture presence    Fragile X syndrome    Follow-up in about 3 months (around 5/11/2019).    Shahnaz Gupta MD  Internal Medicine  [facility]

## 2019-02-12 LAB
ESTIMATED AVG GLUCOSE: 166 MG/DL
HBA1C MFR BLD HPLC: 7.4 %

## 2019-05-13 ENCOUNTER — HOSPITAL ENCOUNTER (OUTPATIENT)
Facility: HOSPITAL | Age: 84
Discharge: HOME | End: 2019-05-14
Attending: EMERGENCY MEDICINE | Admitting: EMERGENCY MEDICINE
Payer: MEDICARE

## 2019-05-13 ENCOUNTER — OFFICE VISIT (OUTPATIENT)
Dept: INTERNAL MEDICINE | Facility: CLINIC | Age: 84
End: 2019-05-13
Payer: MEDICARE

## 2019-05-13 VITALS — DIASTOLIC BLOOD PRESSURE: 90 MMHG | SYSTOLIC BLOOD PRESSURE: 168 MMHG | HEART RATE: 110 BPM | OXYGEN SATURATION: 95 %

## 2019-05-13 DIAGNOSIS — R51.9 PRESSURE IN HEAD: ICD-10-CM

## 2019-05-13 DIAGNOSIS — R53.83 FATIGUE: ICD-10-CM

## 2019-05-13 DIAGNOSIS — E11.49 TYPE II DIABETES MELLITUS WITH NEUROLOGICAL MANIFESTATIONS: ICD-10-CM

## 2019-05-13 DIAGNOSIS — R42 VERTIGO: ICD-10-CM

## 2019-05-13 DIAGNOSIS — R55 SYNCOPE, UNSPECIFIED SYNCOPE TYPE: ICD-10-CM

## 2019-05-13 DIAGNOSIS — E53.8 B12 DEFICIENCY: ICD-10-CM

## 2019-05-13 DIAGNOSIS — I10 ESSENTIAL HYPERTENSION: ICD-10-CM

## 2019-05-13 DIAGNOSIS — F03.91 DEMENTIA WITH BEHAVIORAL DISTURBANCE, UNSPECIFIED DEMENTIA TYPE: ICD-10-CM

## 2019-05-13 DIAGNOSIS — R42 DIZZINESS: Primary | ICD-10-CM

## 2019-05-13 DIAGNOSIS — R55 SYNCOPE: Primary | ICD-10-CM

## 2019-05-13 LAB
ALBUMIN SERPL BCP-MCNC: 4.4 G/DL (ref 3.5–5.2)
ALP SERPL-CCNC: 81 U/L (ref 55–135)
ALT SERPL W/O P-5'-P-CCNC: 17 U/L (ref 10–44)
ANION GAP SERPL CALC-SCNC: 13 MMOL/L (ref 8–16)
AST SERPL-CCNC: 18 U/L (ref 10–40)
BASOPHILS # BLD AUTO: 0.07 K/UL (ref 0–0.2)
BASOPHILS NFR BLD: 0.7 % (ref 0–1.9)
BILIRUB SERPL-MCNC: 0.4 MG/DL (ref 0.1–1)
BUN SERPL-MCNC: 13 MG/DL (ref 10–30)
CALCIUM SERPL-MCNC: 10.7 MG/DL (ref 8.7–10.5)
CHLORIDE SERPL-SCNC: 101 MMOL/L (ref 95–110)
CO2 SERPL-SCNC: 28 MMOL/L (ref 23–29)
CREAT SERPL-MCNC: 0.7 MG/DL (ref 0.5–1.4)
DIFFERENTIAL METHOD: ABNORMAL
EOSINOPHIL # BLD AUTO: 0.4 K/UL (ref 0–0.5)
EOSINOPHIL NFR BLD: 3.4 % (ref 0–8)
ERYTHROCYTE [DISTWIDTH] IN BLOOD BY AUTOMATED COUNT: 13.7 % (ref 11.5–14.5)
EST. GFR  (AFRICAN AMERICAN): >60 ML/MIN/1.73 M^2
EST. GFR  (NON AFRICAN AMERICAN): >60 ML/MIN/1.73 M^2
ESTIMATED AVG GLUCOSE: 177 MG/DL (ref 68–131)
GLUCOSE SERPL-MCNC: 150 MG/DL (ref 70–110)
HBA1C MFR BLD HPLC: 7.8 % (ref 4–5.6)
HCT VFR BLD AUTO: 43.5 % (ref 37–48.5)
HGB BLD-MCNC: 15.5 G/DL (ref 12–16)
IMM GRANULOCYTES # BLD AUTO: 0.05 K/UL (ref 0–0.04)
IMM GRANULOCYTES NFR BLD AUTO: 0.5 % (ref 0–0.5)
LYMPHOCYTES # BLD AUTO: 2.2 K/UL (ref 1–4.8)
LYMPHOCYTES NFR BLD: 20.6 % (ref 18–48)
MCH RBC QN AUTO: 31.6 PG (ref 27–31)
MCHC RBC AUTO-ENTMCNC: 35.6 G/DL (ref 32–36)
MCV RBC AUTO: 89 FL (ref 82–98)
MONOCYTES # BLD AUTO: 0.8 K/UL (ref 0.3–1)
MONOCYTES NFR BLD: 7.3 % (ref 4–15)
NEUTROPHILS # BLD AUTO: 7.1 K/UL (ref 1.8–7.7)
NEUTROPHILS NFR BLD: 67.5 % (ref 38–73)
NRBC BLD-RTO: 0 /100 WBC
PLATELET # BLD AUTO: 244 K/UL (ref 150–350)
PMV BLD AUTO: 12.2 FL (ref 9.2–12.9)
POCT GLUCOSE: 150 MG/DL (ref 70–110)
POCT GLUCOSE: 163 MG/DL (ref 70–110)
POCT GLUCOSE: 194 MG/DL (ref 70–110)
POTASSIUM SERPL-SCNC: 3.5 MMOL/L (ref 3.5–5.1)
PROT SERPL-MCNC: 8 G/DL (ref 6–8.4)
RBC # BLD AUTO: 4.9 M/UL (ref 4–5.4)
SODIUM SERPL-SCNC: 142 MMOL/L (ref 136–145)
TROPONIN I SERPL DL<=0.01 NG/ML-MCNC: <0.006 NG/ML (ref 0–0.03)
TROPONIN I SERPL DL<=0.01 NG/ML-MCNC: <0.006 NG/ML (ref 0–0.03)
TSH SERPL DL<=0.005 MIU/L-ACNC: 2 UIU/ML (ref 0.4–4)
WBC # BLD AUTO: 10.54 K/UL (ref 3.9–12.7)

## 2019-05-13 PROCEDURE — 93010 EKG 12-LEAD: ICD-10-PCS | Mod: HCNC,,, | Performed by: INTERNAL MEDICINE

## 2019-05-13 PROCEDURE — 1101F PT FALLS ASSESS-DOCD LE1/YR: CPT | Mod: HCNC,CPTII,S$GLB, | Performed by: INTERNAL MEDICINE

## 2019-05-13 PROCEDURE — 84484 ASSAY OF TROPONIN QUANT: CPT | Mod: 91,HCNC

## 2019-05-13 PROCEDURE — 99219 PR INITIAL OBSERVATION CARE,LEVL II: CPT | Mod: HCNC,,, | Performed by: NURSE PRACTITIONER

## 2019-05-13 PROCEDURE — 99214 PR OFFICE/OUTPT VISIT, EST, LEVL IV, 30-39 MIN: ICD-10-PCS | Mod: HCNC,S$GLB,, | Performed by: INTERNAL MEDICINE

## 2019-05-13 PROCEDURE — 93010 ELECTROCARDIOGRAM REPORT: CPT | Mod: HCNC,,, | Performed by: INTERNAL MEDICINE

## 2019-05-13 PROCEDURE — 84443 ASSAY THYROID STIM HORMONE: CPT | Mod: HCNC

## 2019-05-13 PROCEDURE — 99499 RISK ADDL DX/OHS AUDIT: ICD-10-PCS | Mod: S$GLB,,, | Performed by: INTERNAL MEDICINE

## 2019-05-13 PROCEDURE — 99285 PR EMERGENCY DEPT VISIT,LEVEL V: ICD-10-PCS | Mod: HCNC,,, | Performed by: EMERGENCY MEDICINE

## 2019-05-13 PROCEDURE — 25000003 PHARM REV CODE 250: Mod: HCNC | Performed by: EMERGENCY MEDICINE

## 2019-05-13 PROCEDURE — 82962 GLUCOSE BLOOD TEST: CPT | Mod: HCNC

## 2019-05-13 PROCEDURE — 99285 EMERGENCY DEPT VISIT HI MDM: CPT | Mod: HCNC,,, | Performed by: EMERGENCY MEDICINE

## 2019-05-13 PROCEDURE — 99285 EMERGENCY DEPT VISIT HI MDM: CPT | Mod: 25,HCNC

## 2019-05-13 PROCEDURE — 83036 HEMOGLOBIN GLYCOSYLATED A1C: CPT | Mod: HCNC

## 2019-05-13 PROCEDURE — 99214 OFFICE O/P EST MOD 30 MIN: CPT | Mod: HCNC,S$GLB,, | Performed by: INTERNAL MEDICINE

## 2019-05-13 PROCEDURE — 99999 PR PBB SHADOW E&M-EST. PATIENT-LVL III: ICD-10-PCS | Mod: PBBFAC,HCNC,, | Performed by: INTERNAL MEDICINE

## 2019-05-13 PROCEDURE — 96361 HYDRATE IV INFUSION ADD-ON: CPT | Mod: HCNC

## 2019-05-13 PROCEDURE — 36415 COLL VENOUS BLD VENIPUNCTURE: CPT | Mod: HCNC

## 2019-05-13 PROCEDURE — G0378 HOSPITAL OBSERVATION PER HR: HCPCS | Mod: HCNC

## 2019-05-13 PROCEDURE — A4216 STERILE WATER/SALINE, 10 ML: HCPCS | Mod: HCNC | Performed by: NURSE PRACTITIONER

## 2019-05-13 PROCEDURE — 96360 HYDRATION IV INFUSION INIT: CPT | Mod: HCNC

## 2019-05-13 PROCEDURE — 1101F PR PT FALLS ASSESS DOC 0-1 FALLS W/OUT INJ PAST YR: ICD-10-PCS | Mod: HCNC,CPTII,S$GLB, | Performed by: INTERNAL MEDICINE

## 2019-05-13 PROCEDURE — 99219 PR INITIAL OBSERVATION CARE,LEVL II: ICD-10-PCS | Mod: HCNC,,, | Performed by: NURSE PRACTITIONER

## 2019-05-13 PROCEDURE — 99499 UNLISTED E&M SERVICE: CPT | Mod: S$GLB,,, | Performed by: INTERNAL MEDICINE

## 2019-05-13 PROCEDURE — 25000003 PHARM REV CODE 250: Mod: HCNC | Performed by: NURSE PRACTITIONER

## 2019-05-13 PROCEDURE — 93005 ELECTROCARDIOGRAM TRACING: CPT | Mod: HCNC

## 2019-05-13 PROCEDURE — 99999 PR PBB SHADOW E&M-EST. PATIENT-LVL III: CPT | Mod: PBBFAC,HCNC,, | Performed by: INTERNAL MEDICINE

## 2019-05-13 PROCEDURE — 85025 COMPLETE CBC W/AUTO DIFF WBC: CPT | Mod: HCNC

## 2019-05-13 PROCEDURE — 80053 COMPREHEN METABOLIC PANEL: CPT | Mod: HCNC

## 2019-05-13 RX ORDER — DOCUSATE SODIUM 100 MG/1
100 CAPSULE, LIQUID FILLED ORAL DAILY
Status: DISCONTINUED | OUTPATIENT
Start: 2019-05-14 | End: 2019-05-13

## 2019-05-13 RX ORDER — SIMVASTATIN 40 MG/1
40 TABLET, FILM COATED ORAL DAILY
Status: DISCONTINUED | OUTPATIENT
Start: 2019-05-14 | End: 2019-05-14 | Stop reason: HOSPADM

## 2019-05-13 RX ORDER — SERTRALINE HYDROCHLORIDE 25 MG/1
25 TABLET, FILM COATED ORAL DAILY
Status: DISCONTINUED | OUTPATIENT
Start: 2019-05-14 | End: 2019-05-14 | Stop reason: HOSPADM

## 2019-05-13 RX ORDER — GLUCAGON 1 MG
1 KIT INJECTION
Status: DISCONTINUED | OUTPATIENT
Start: 2019-05-13 | End: 2019-05-14 | Stop reason: HOSPADM

## 2019-05-13 RX ORDER — INSULIN ASPART 100 [IU]/ML
0-5 INJECTION, SOLUTION INTRAVENOUS; SUBCUTANEOUS
Status: DISCONTINUED | OUTPATIENT
Start: 2019-05-13 | End: 2019-05-14 | Stop reason: HOSPADM

## 2019-05-13 RX ORDER — SODIUM CHLORIDE 0.9 % (FLUSH) 0.9 %
10 SYRINGE (ML) INJECTION
Status: CANCELLED | OUTPATIENT
Start: 2019-05-13

## 2019-05-13 RX ORDER — IBUPROFEN 200 MG
16 TABLET ORAL
Status: DISCONTINUED | OUTPATIENT
Start: 2019-05-13 | End: 2019-05-14 | Stop reason: HOSPADM

## 2019-05-13 RX ORDER — ONDANSETRON 8 MG/1
8 TABLET, ORALLY DISINTEGRATING ORAL EVERY 8 HOURS PRN
Status: DISCONTINUED | OUTPATIENT
Start: 2019-05-13 | End: 2019-05-14 | Stop reason: HOSPADM

## 2019-05-13 RX ORDER — QUETIAPINE FUMARATE 25 MG/1
50 TABLET, FILM COATED ORAL NIGHTLY
Status: DISCONTINUED | OUTPATIENT
Start: 2019-05-13 | End: 2019-05-14 | Stop reason: HOSPADM

## 2019-05-13 RX ORDER — QUETIAPINE FUMARATE 25 MG/1
50 TABLET, FILM COATED ORAL DAILY
Status: DISCONTINUED | OUTPATIENT
Start: 2019-05-14 | End: 2019-05-14 | Stop reason: HOSPADM

## 2019-05-13 RX ORDER — ACETAMINOPHEN 325 MG/1
650 TABLET ORAL EVERY 6 HOURS PRN
Status: DISCONTINUED | OUTPATIENT
Start: 2019-05-13 | End: 2019-05-14 | Stop reason: HOSPADM

## 2019-05-13 RX ORDER — SODIUM CHLORIDE 0.9 % (FLUSH) 0.9 %
3 SYRINGE (ML) INJECTION EVERY 8 HOURS
Status: DISCONTINUED | OUTPATIENT
Start: 2019-05-13 | End: 2019-05-14 | Stop reason: HOSPADM

## 2019-05-13 RX ORDER — IBUPROFEN 200 MG
24 TABLET ORAL
Status: DISCONTINUED | OUTPATIENT
Start: 2019-05-13 | End: 2019-05-14 | Stop reason: HOSPADM

## 2019-05-13 RX ORDER — ONDANSETRON 2 MG/ML
4 INJECTION INTRAMUSCULAR; INTRAVENOUS EVERY 8 HOURS PRN
Status: DISCONTINUED | OUTPATIENT
Start: 2019-05-13 | End: 2019-05-14 | Stop reason: HOSPADM

## 2019-05-13 RX ORDER — MECLIZINE HCL 12.5 MG 12.5 MG/1
12.5 TABLET ORAL 3 TIMES DAILY PRN
Status: DISCONTINUED | OUTPATIENT
Start: 2019-05-13 | End: 2019-05-14 | Stop reason: HOSPADM

## 2019-05-13 RX ORDER — DOCUSATE SODIUM 100 MG/1
100 CAPSULE, LIQUID FILLED ORAL DAILY PRN
Status: DISCONTINUED | OUTPATIENT
Start: 2019-05-13 | End: 2019-05-14 | Stop reason: HOSPADM

## 2019-05-13 RX ADMIN — ACETAMINOPHEN 650 MG: 325 TABLET ORAL at 08:05

## 2019-05-13 RX ADMIN — SODIUM CHLORIDE 500 ML: 0.9 INJECTION, SOLUTION INTRAVENOUS at 12:05

## 2019-05-13 RX ADMIN — Medication 3 ML: at 09:05

## 2019-05-13 RX ADMIN — QUETIAPINE FUMARATE 50 MG: 25 TABLET ORAL at 09:05

## 2019-05-13 NOTE — ASSESSMENT & PLAN NOTE
Vertigo  Headache  -patient with long history of pre-syncopal episodes  - in setting of headache + HTN + pre-syncope- CTH-pending  - last TTE in 2015 with normal EF and DD; repeat pending    - will monitor on tele overnight  - orthostatics pending  - UA pending  - lab work unremarkable  - meclizine PRN  - PT/OT

## 2019-05-13 NOTE — ED PROVIDER NOTES
Encounter Date: 5/13/2019    SCRIBE #1 NOTE: I, Cecilia Arriaga, am scribing for, and in the presence of,  Dr. Mahaemd Osuna. I have scribed the entire note.       History     Chief Complaint   Patient presents with    Fatigue     sent from im clinic, slumped over no loc , been happening for past 2 weeks     89 year old female presents to the ED with dizziness for the last 2-3 weeks, with near syncope. Patient's relative  states that the patient has a history of syncope for years, but in the last 2-3 weeks, episodes have been more frequent, sometimes as many a 2-3 episodes/day. Relative states that patient does not completely synopsize, but complains of extreme dizziness with pain to the top of her head. Patient is able to communicate during these episodes. Patient was referred to the ED today after experiencing one such near syncope event at her internist's (Dr. Gupta) office. Patient denies any recent falls, fever, chest pain, dysuria, and any other complaints at this time.    PMHx includes vertigo, dementia, anxiety, atypical chest pain, GERD, IBS, hypertension, and hyperlipidemia.    The history is provided by the patient and a relative.     Review of patient's allergies indicates:   Allergen Reactions    Glimepiride      Other reaction(s): tremor  Other reaction(s): tremor    Naproxen      Other reaction(s): Jittery  Other reaction(s): Jittery     Past Medical History:   Diagnosis Date    Anxiety     Atypical chest pain     Dementia     Diabetes mellitus     DJD (degenerative joint disease)     GERD (gastroesophageal reflux disease)     Hyperlipidemia     Hypertension     Irritable bowel     Tremor, essential     Vertigo      Past Surgical History:   Procedure Laterality Date    HYSTERECTOMY      IM NAILING OF FEMUR Left 3/21/2017    Performed [facility]jo [facility] at [facility] OR 2ND FLR    JOINT REPLACEMENT      L total knee    KNEE SURGERY      bilat    QUADRICEPS REPAIR      R     Family  History   Problem Relation Age of Onset    Cancer Mother         kidney    Diabetes Father     Parkinsonism Father     Alcohol abuse Brother      Social History     Tobacco Use    Smoking status: Former Smoker     Types: Cigarettes     Last attempt to quit: 8/3/1957     Years since quittin.8    Smokeless tobacco: Never Used    Tobacco comment: Sociall in her 30's   Substance Use Topics    Alcohol use: No    Drug use: No     Review of Systems  General: No fever.  No chills.  Eyes: No visual changes.  Head: No headache. Pain to the top of her head.   Integument: No rashes or lesions.  Chest: No shortness of breath.  Cardiovascular: No chest pain.  Abdomen: No abdominal pain.  No nausea or vomiting.  Urinary: No abnormal urination.  Neurologic: No focal weakness.  No numbness. Dizziness. Near syncope.  Hematologic: No easy bruising.  Endocrine: No excessive thirst or urination.    Physical Exam     Initial Vitals [19 1132]   BP Pulse Resp Temp SpO2   (!) 154/96 97 18 98.6 °F (37 °C) 96 %      MAP       --         Physical Exam    Nursing note and vitals reviewed.    Appearance: No acute distress.  Skin: No rashes seen.  Good turgor.  No abrasions.  No ecchymoses.  Eyes: No conjunctival injection.  ENT: Oropharynx clear.  Chronically deaf.  Chest: Clear to auscultation bilaterally.  Good air movement.  No wheezes.  No rhonchi.  Cardiovascular: Regular rate and rhythm.  No murmurs. No gallops. No rubs.  Abdomen: Soft.  Not distended.  Nontender.  No guarding.  No rebound.  Musculoskeletal: Good range of motion all joints.  No deformities.  Neck supple.  No meningismus.  Neurologic: Motor intact.  Sensation intact. No focal neural defects.  Cerebellar intact.  Cranial nerves intact.  Mental Status:  Alert and oriented x 3.  Appropriate, conversant.      ED Course   Procedures  Labs Reviewed   CBC W/ AUTO DIFFERENTIAL - Abnormal; Notable for the following components:       Result Value    Mean  Corpuscular Hemoglobin 31.6 (*)     Immature Grans (Abs) 0.05 (*)     All other components within normal limits   COMPREHENSIVE METABOLIC PANEL - Abnormal; Notable for the following components:    Glucose 150 (*)     Calcium 10.7 (*)     All other components within normal limits   POCT GLUCOSE - Abnormal; Notable for the following components:    POCT Glucose 163 (*)     All other components within normal limits   TROPONIN I   TROPONIN I   URINALYSIS, REFLEX TO URINE CULTURE   HEMOGLOBIN A1C   TSH   POCT GLUCOSE MONITORING CONTINUOUS   POCT GLUCOSE MONITORING CONTINUOUS     EKG Readings: (Independently Interpreted)   Normal sinus rhythm. Left anterior fascicular block. No acute findings.      ECG Results          EKG 12-lead (In process)  Result time 05/13/19 13:49:18    In process by Interface, Lab In [facility] (05/13/19 13:49:18)                 Narrative:    Test Reason : R53.83,    Vent. Rate : 096 BPM     Atrial Rate : 096 BPM     P-R Int : 200 ms          QRS Dur : 092 ms      QT Int : 362 ms       P-R-T Axes : 037 -84 067 degrees     QTc Int : 457 ms    Normal sinus rhythm  Left anterior fascicular block  Abnormal ECG  When compared with ECG of 01-AUG-2017 10:24,  No significant change was found    Referred By: [email or web address]   SELF           Confirmed By:                             Imaging Results          X-Ray Chest AP Portable (Final result)  Result time 05/13/19 13:16:23    Final result by Terrell Luna MD (05/13/19 13:16:23)                 Impression:      See above      Electronically signed by: Terrell Luna MD  Date:    05/13/2019  Time:    13:16             Narrative:    EXAMINATION:  XR CHEST AP PORTABLE    CLINICAL HISTORY:  syncope;    TECHNIQUE:  Single frontal view of the chest was performed.    COMPARISON:  No 03/24/2017 ne    FINDINGS:  Mild cardiomegaly.  Ill-defined opacity adjacent to the left hilum identified and remain unchanged as compared to the previous study.  Otherwise the lungs are  clear.  No pleural effusion.                                 Medical Decision Making:   History:   Old Medical Records: I decided to obtain old medical records.  Initial Assessment:   Patient with years of syncope,  over the last 2-3 weeks has had markedly increase frequency of episodes.Had one at the doctor's office today. Will do syncope workup and labs. Anticipate observation.  She occasionally notes some occipital headache that radiates forward to the vertex, none currently.      3:33 PM  Discussed with hospital medicine. Will place her in observation.  Independently Interpreted Test(s):   I have ordered and independently interpreted EKG Reading(s) - see prior notes  Clinical Tests:   Lab Tests: Reviewed and Ordered  Radiological Study: Ordered and Reviewed  Medical Tests: Ordered and Reviewed  Other:   I have discussed this case with another health care provider.       <> Summary of the Discussion: Consult hospital medicine.            Scribe Attestation:   Scribe #1: I performed the above scribed service and the documentation accurately describes the services I performed. I attest to the accuracy of the note.               Clinical Impression:       ICD-10-CM ICD-9-CM   1. Fatigue R53.83 780.79   2. Syncope R55 780.2                                Mahamed Osuna MD  05/13/19 1643

## 2019-05-13 NOTE — ASSESSMENT & PLAN NOTE
- awaiting orthostatic blood pressures prior to restarting home medications; if negative will restart

## 2019-05-13 NOTE — ED NOTES
Patient on stretcher, Bed placed in low/locked position, side rails up x 2, call light is within reach of patient or family, Patient placed into position of comfort. Patient in NAD and assisted to the bedside commode at this time. Will continue to monitor.

## 2019-05-13 NOTE — PROGRESS NOTES
Internal Medicine    Subjective:      Patient ID: Zeina White is a 89 y.o. female.    Chief Complaint: Follow-up and Dizziness    HPI:  Patient presents for follow up appointment.  The patient's last visit with me was on 2/11/2019.    Dizziness, pressure in head, presyncopal versus syncopal episodes:  While MA was getting the patient's vitals prior to appointment, patient had a presyncopal versus syncopal episode.  Unclear if patient lost consciousness for a brief moment however she slumped over in her chair for a few seconds before coming to.  Daughter picked up her head and started talking to her and she then opened her eyes per MA.  Daughter reports patient has been complaining of dizziness and having these presyncopal versus syncopal episodes about 3 times per day for the last 2-3 weeks.  Patient complaining of pressure in her head (points to the top of her head) but denies pain.  Patient and daughter deny changes in speech or new onset weakness or numbness.  Patient is wheelchair bound and has 24/7 care at home.  Family monitors her blood pressure and blood sugar closely and these have been stable in the last few weeks.  Per daughter, she did check blood pressure during an episode at home and it was normal ().  Patient chest pain, pressure, shortness of breath, or palpitations.      Chronic issues not discussed today:    HTN:  Taking Norvasc 10mg daily and lisinopril 40mg daily.  BP occasionally still in 140's.  Discussed with patient and family possible increased risk of lung cancer with lisinopril based on one study.  They would like to continue current medications.     HLD:  Taking simvistatin 40mg daily.  Denies side effects.     DM2:  Taking metformin 500mg BID.  Last HgA1c 7.2 on 8/7/18.       Bilateral knee OA (R>L):  Saw MAGDALENA Howell on 4/13/18.  Given steroid injection.  Also using Voltaren gel at home and taking ibuprofen and Tylenol alternating as needed (not often).       IBS,  predominantly constipation:  Now using Colace as needed and avoiding frequent use of laxatives.      Dementia with behavioral disturbance, depression:  Patient has 24/7 sitters.  Followed by Neurology (Dr. Churchill) - last seen 12/20/17.  Taking Aricept 5mg qHS, Zoloft 25mg daily, and Seroquel 50mg at 3pm + 62.5mg at 6pm.      GERD:  Takes Zantac 150mg BID.        Osteoporosis:  Last DEXA in 2009 showing osteoporosis of hip, recommendation was repeat in 2 years.  Family has declined repeat DEXA, treatment for osteoporosis, and Endocrine referral.     Vit B12:  Borderline low at 221 on 8/1/17.  Patient then started taking B12 1000mcg daily and repeat B12 651 on 2/8/18.  B12 dose reduced to 500mcg daily.     Macular degeneration:  Sees Ophthalmology.  Taking Preservision.       Fragile X Syndrome.       Review of Systems   Constitutional: Negative for appetite change, chills, fever and unexpected weight change.   HENT: Negative for sore throat and trouble swallowing.    Eyes: Negative for visual disturbance.   Respiratory: Negative for cough, chest tightness, shortness of breath and wheezing.    Cardiovascular: Negative for chest pain, palpitations and leg swelling.   Gastrointestinal: Positive for diarrhea (Increased BM's today (has IBS)). Negative for abdominal pain, blood in stool, constipation, nausea and vomiting.   Genitourinary: Negative for difficulty urinating.   Musculoskeletal: Negative for arthralgias and myalgias.   Skin: Negative for rash and wound.   Neurological: Positive for dizziness and weakness (Chronic, generalized). Negative for numbness and headaches.        Pressure in head   Psychiatric/Behavioral: Positive for confusion (Dementia, chronic). Negative for behavioral problems.       Past medical history, surgical history, and family medical history reviewed and updated as appropriate.    Medications and allergies reviewed.     Objective:     Vitals:    05/13/19 1118   BP: (!) 168/90   BP  Location: Left arm   Patient Position: Sitting   BP Method: Medium (Automatic)   Pulse: 110   SpO2: 95%     Physical Exam   Constitutional: She is oriented to person, place, and time. She appears well-developed and well-nourished. No distress.   Sitting in wheelchair, elderly female   HENT:   Head: Normocephalic and atraumatic.   Eyes: Pupils are equal, round, and reactive to light. Conjunctivae and EOM are normal. No scleral icterus.   Cardiovascular: Tachycardia present. Exam reveals no gallop and no friction rub.   No murmur heard.  Pulmonary/Chest: Effort normal and breath sounds normal. No respiratory distress.   Musculoskeletal: She exhibits no edema or tenderness.   Neurological: She is alert and oriented to person, place, and time.   Chronic difficulty hearing   Psychiatric: She has a normal mood and affect. Her behavior is normal.       RESULTS:   Lab Results   Component Value Date    WBC 8.50 02/11/2019    HGB 14.0 02/11/2019    HCT 44.2 02/11/2019    MCV 91 02/11/2019     02/11/2019     BMP  Lab Results   Component Value Date     02/11/2019    K 3.7 02/11/2019     02/11/2019    CO2 27 02/11/2019    BUN 14 02/11/2019    CREATININE 0.7 02/11/2019    CALCIUM 10.2 02/11/2019    ANIONGAP 12 02/11/2019    ESTGFRAFRICA >60 02/11/2019    EGFRNONAA >60 02/11/2019     Lab Results   Component Value Date    ALT 17 02/11/2019    AST 18 02/11/2019    ALKPHOS 66 02/11/2019    BILITOT 0.4 02/11/2019     Lab Results   Component Value Date    TSH 1.593 11/07/2018     Lab Results   Component Value Date    HGBA1C 7.4 (H) 02/11/2019         Assessment:     1. Dizziness    2. Pressure in head    3. Syncope, unspecified syncope type        Plan:     *Continue medication/plan as discussed in HPI except for changes discussed below.    Zeina was seen today for follow-up and dizziness.    Diagnoses and all orders for this visit:    Dizziness  -     Refer to Emergency Dept.    Pressure in head  -     Refer to  Emergency Dept.    Syncope versus Presyncope  -     Refer to Emergency Dept.    Spoke with Dr. Grion in ED for transfer of care.    Follow up PRN after ER evaluation.    Shahnaz Gupta MD  Internal Medicine  [facility]

## 2019-05-13 NOTE — SUBJECTIVE & OBJECTIVE
Past Medical History:   Diagnosis Date    Anxiety     Atypical chest pain     Dementia     Diabetes mellitus     DJD (degenerative joint disease)     GERD (gastroesophageal reflux disease)     Hyperlipidemia     Hypertension     Irritable bowel     Tremor, essential     Vertigo        Past Surgical History:   Procedure Laterality Date    HYSTERECTOMY      IM NAILING OF FEMUR Left 3/21/2017    Performed by Presley Cornejo MD at [facility] OR 2ND FLR    JOINT REPLACEMENT      L total knee    KNEE SURGERY      bilat    QUADRICEPS REPAIR      R       Review of patient's allergies indicates:   Allergen Reactions    Glimepiride      Other reaction(s): tremor  Other reaction(s): tremor    Naproxen      Other reaction(s): Jittery  Other reaction(s): Jittery       No current facility-administered medications on file prior to encounter.      Current Outpatient Medications on File Prior to Encounter   Medication Sig    acetaminophen (TYLENOL) 500 MG tablet Take 500 mg by mouth 3 (three) times daily as needed for Pain.    amLODIPine (NORVASC) 5 MG tablet Take 1 tablet (5 mg total) by mouth 2 (two) times daily.    b complex vitamins tablet Take 1 tablet by mouth once daily.    docusate sodium (COLACE) 100 MG capsule Take 100 mg by mouth once daily.    lisinopril (PRINIVIL,ZESTRIL) 40 MG tablet TAKE 1 TABLET(40 MG) BY MOUTH EVERY DAY    meclizine (ANTIVERT) 12.5 mg tablet Take 1 tablet (12.5 mg total) by mouth 3 (three) times daily as needed for Dizziness.    metFORMIN (GLUCOPHAGE) 500 MG tablet TAKE 1 TABLET BY MOUTH TWICE DAILY WITH MEALS    multivitamin capsule Take 1 capsule by mouth once daily.    QUEtiapine (SEROQUEL) 25 MG Tab TAKE 2 TABLETS BY MOUTH EVERY DAY AT 3PM AND 2 AND 1/2 TABLETS BY MOUTH EVERY EVENING    ranitidine (ZANTAC) 150 MG tablet TAKE 1 TABLET(150 MG) BY MOUTH TWICE DAILY    sertraline (ZOLOFT) 25 MG tablet Take 1 tablet (25 mg total) by mouth once daily.    simvastatin (ZOCOR)  40 MG tablet TAKE 1 TABLET BY MOUTH EVERY DAY    blood sugar diagnostic Strp 1 strip by Misc.(Non-Drug; Combo Route) route 2 (two) times daily. May have a 30 or 90 day supply    TRUE METRIX GLUCOSE METER Misc     TRUEPLUS LANCETS 33 gauge Misc     VOLTAREN 1 % Gel APPLY 2 GRAMS EXTERNALLY TO THE AFFECTED AREA EVERY DAY     Family History     Problem Relation (Age of Onset)    Alcohol abuse Brother    Cancer Mother    Diabetes Father    Parkinsonism Father        Tobacco Use    Smoking status: Former Smoker     Types: Cigarettes     Last attempt to quit: 8/3/1957     Years since quittin.8    Smokeless tobacco: Never Used    Tobacco comment: Sociall in her 30's   Substance and Sexual Activity    Alcohol use: No    Drug use: No    Sexual activity: Never     Review of Systems   Constitutional: Negative for chills, fatigue and fever.   Respiratory: Negative for cough, chest tightness, shortness of breath and wheezing.    Cardiovascular: Negative for chest pain, palpitations and leg swelling.   Gastrointestinal: Negative for abdominal pain, constipation, diarrhea, nausea and vomiting.   Genitourinary: Negative for dysuria, flank pain and hematuria.   Skin: Negative for color change and rash.   Neurological: Positive for light-headedness and headaches. Negative for dizziness and weakness.     Objective:     Vital Signs (Most Recent):  Temp: 98.6 °F (37 °C) (19 1132)  Pulse: 85 (19 1436)  Resp: (!) 23 (19 1436)  BP: (!) 161/74 (19 1436)  SpO2: 97 % (19 1436) Vital Signs (24h Range):  Temp:  [98.6 °F (37 °C)] 98.6 °F (37 °C)  Pulse:  [] 85  Resp:  [18-23] 23  SpO2:  [95 %-97 %] 97 %  BP: (151-175)/(70-96) 161/74     Weight: 66.7 kg (147 lb)  Body mass index is 32.96 kg/m².    Physical Exam   Constitutional: She is oriented to person, place, and time. She appears well-developed and well-nourished. No distress.   Patient very hard of hearing    HENT:   Head: Normocephalic and  atraumatic.   Mouth/Throat: Mucous membranes are normal.   Eyes: Pupils are equal, round, and reactive to light. Conjunctivae and EOM are normal.   Neck: Trachea normal. No JVD present. No tracheal deviation present.   Cardiovascular: Normal rate and regular rhythm. Exam reveals no gallop and no friction rub.   No murmur heard.  Pulmonary/Chest: Effort normal and breath sounds normal. No stridor. No respiratory distress. She has no wheezes. She has no rhonchi. She has no rales. She exhibits no tenderness.   Abdominal: Soft. Bowel sounds are normal. She exhibits no distension, no ascites and no mass. There is no tenderness. There is no guarding.   Musculoskeletal: She exhibits no edema or deformity.   Neurological: She is alert and oriented to person, place, and time. No cranial nerve deficit.   Skin: Skin is warm, dry and intact. Capillary refill takes less than 2 seconds. No rash noted. She is not diaphoretic. No cyanosis or erythema. Nails show no clubbing.   Psychiatric: She has a normal mood and affect. Her speech is normal and behavior is normal. Judgment and thought content normal. Cognition and memory are normal.   Vitals reviewed.        CRANIAL NERVES     CN III, IV, VI   Pupils are equal, round, and reactive to light.  Extraocular motions are normal.        Significant Labs:   CBC:   Recent Labs   Lab 05/13/19  1241   WBC 10.54   HGB 15.5   HCT 43.5        CMP:   Recent Labs   Lab 05/13/19  1241      K 3.5      CO2 28   *   BUN 13   CREATININE 0.7   CALCIUM 10.7*   PROT 8.0   ALBUMIN 4.4   BILITOT 0.4   ALKPHOS 81   AST 18   ALT 17   ANIONGAP 13   EGFRNONAA >60.0       Significant Imaging: I have reviewed all pertinent imaging results/findings within the past 24 hours.

## 2019-05-13 NOTE — ED TRIAGE NOTES
Pt presents to the ED with complaints of dizziness x2weeks, no LOC, and headache. Pt was at internist Dr. Gupta's office and was referred here. Pt deinies chest pain, SOB, fever.    Patient identifiers verified and correct for June Estopinal.  LOC: The patient is awake, alert and aware of environment with an appropriate affect, the patient is oriented x 3 and speaking appropriately. Pt reports headache and dizziness.  APPEARANCE: Patient appears comfortable and in no acute distress, patient is clean and well groomed.  SKIN: The skin is warm and dry, color consistent with ethnicity, patient has normal skin turgor and moist mucus membranes, skin intact, no breakdown or bruising noted.   MUSCULOSKELETAL: Patient moving all extremities spontaneously, no swelling noted.  RESPIRATORY: Airway is open and patent, respirations are spontaneous, patient has a normal effort and rate, no accessory muscle use noted, pt placed on continuous pulse ox with O2 sats noted at 96% on room air.  CARDIAC: Pt placed on cardiac monitor. Patient has a normal rate and regular rhythm, no edema noted, capillary refill < 3 seconds.   GASTRO: Soft and non tender to palpation, no distention noted, normoactive bowel sounds present in all four quadrants. Pt states bowel movements have been regular. Pt reports IBS. Pt also reports occasional heartburn.  : Pt denies any pain or frequency with urination.  NEURO: Pt opens eyes spontaneously, behavior appropriate to situation, follows commands, facial expression symmetrical, bilateral hand grasp equal and even, purposeful motor response noted, normal sensation in all extremities when touched with a finger.

## 2019-05-13 NOTE — HPI
Patient, PMHx includes vertigo, dementia, anxiety, atypical chest pain, GERD, IBS, hypertension, and hyperlipidemia, admitted to hospital medicine for with near syncope. Patient's relative states that the patient has a history of syncope for years, but in the last 2-3 weeks, episodes have been more frequent, sometimes as many a 2-3 episodes/day. Relative states that patient does not completely synopsize, but complains of extreme dizziness with pain to the top of her head. Patient is able to communicate during these episodes. Patient was referred to the ED today after experiencing one such near syncope event at her internist's (Dr. Gupta) office. Patient denies any recent falls, fever, chest pain, dysuria, and any other complaints at this time.       IN ED: 500ml bolus, CXR clear, UA ordered

## 2019-05-14 VITALS
WEIGHT: 140.19 LBS | BODY MASS INDEX: 31.54 KG/M2 | HEIGHT: 56 IN | SYSTOLIC BLOOD PRESSURE: 132 MMHG | RESPIRATION RATE: 18 BRPM | TEMPERATURE: 98 F | OXYGEN SATURATION: 93 % | HEART RATE: 85 BPM | DIASTOLIC BLOOD PRESSURE: 63 MMHG

## 2019-05-14 LAB
ALBUMIN SERPL BCP-MCNC: 3.3 G/DL (ref 3.5–5.2)
ALP SERPL-CCNC: 61 U/L (ref 55–135)
ALT SERPL W/O P-5'-P-CCNC: 10 U/L (ref 10–44)
ANION GAP SERPL CALC-SCNC: 9 MMOL/L (ref 8–16)
ASCENDING AORTA: 2.75 CM
AST SERPL-CCNC: 13 U/L (ref 10–40)
AV INDEX (PROSTH): 0.66
AV MEAN GRADIENT: 5.64 MMHG
AV PEAK GRADIENT: 10.63 MMHG
AV VALVE AREA: 1.62 CM2
AV VELOCITY RATIO: 0.66
BASOPHILS # BLD AUTO: 0.06 K/UL (ref 0–0.2)
BASOPHILS NFR BLD: 0.8 % (ref 0–1.9)
BILIRUB SERPL-MCNC: 0.3 MG/DL (ref 0.1–1)
BILIRUB UR QL STRIP: NEGATIVE
BSA FOR ECHO PROCEDURE: 1.59 M2
BUN SERPL-MCNC: 14 MG/DL (ref 10–30)
CALCIUM SERPL-MCNC: 8.9 MG/DL (ref 8.7–10.5)
CHLORIDE SERPL-SCNC: 103 MMOL/L (ref 95–110)
CLARITY UR REFRACT.AUTO: CLEAR
CO2 SERPL-SCNC: 28 MMOL/L (ref 23–29)
COLOR UR AUTO: NORMAL
CREAT SERPL-MCNC: 0.7 MG/DL (ref 0.5–1.4)
CV ECHO LV RWT: 0.44 CM
DIFFERENTIAL METHOD: ABNORMAL
DOP CALC AO PEAK VEL: 1.63 M/S
DOP CALC AO VTI: 36.54 CM
DOP CALC LVOT AREA: 2.46 CM2
DOP CALC LVOT DIAMETER: 1.77 CM
DOP CALC LVOT PEAK VEL: 1.08 M/S
DOP CALC LVOT STROKE VOLUME: 59.27 CM3
DOP CALCLVOT PEAK VEL VTI: 24.1 CM
E WAVE DECELERATION TIME: 312.18 MSEC
E/A RATIO: 0.82
E/E' RATIO: 9.18
ECHO LV POSTERIOR WALL: 0.77 CM (ref 0.6–1.1)
EOSINOPHIL # BLD AUTO: 0.4 K/UL (ref 0–0.5)
EOSINOPHIL NFR BLD: 4.7 % (ref 0–8)
ERYTHROCYTE [DISTWIDTH] IN BLOOD BY AUTOMATED COUNT: 13.5 % (ref 11.5–14.5)
EST. GFR  (AFRICAN AMERICAN): >60 ML/MIN/1.73 M^2
EST. GFR  (NON AFRICAN AMERICAN): >60 ML/MIN/1.73 M^2
FRACTIONAL SHORTENING: 24 % (ref 28–44)
GLUCOSE SERPL-MCNC: 144 MG/DL (ref 70–110)
GLUCOSE UR QL STRIP: NEGATIVE
HCT VFR BLD AUTO: 35.6 % (ref 37–48.5)
HGB BLD-MCNC: 11.6 G/DL (ref 12–16)
HGB UR QL STRIP: NEGATIVE
IMM GRANULOCYTES # BLD AUTO: 0.02 K/UL (ref 0–0.04)
IMM GRANULOCYTES NFR BLD AUTO: 0.3 % (ref 0–0.5)
INTERVENTRICULAR SEPTUM: 0.72 CM (ref 0.6–1.1)
KETONES UR QL STRIP: NEGATIVE
LA MAJOR: 4.5 CM
LA MINOR: 4.69 CM
LA WIDTH: 2.95 CM
LEFT ATRIUM SIZE: 3.9 CM
LEFT ATRIUM VOLUME INDEX: 29.4 ML/M2
LEFT ATRIUM VOLUME: 44.92 CM3
LEFT INTERNAL DIMENSION IN SYSTOLE: 2.69 CM (ref 2.1–4)
LEFT VENTRICLE DIASTOLIC VOLUME INDEX: 33.71 ML/M2
LEFT VENTRICLE DIASTOLIC VOLUME: 51.47 ML
LEFT VENTRICLE MASS INDEX: 45.2 G/M2
LEFT VENTRICLE SYSTOLIC VOLUME INDEX: 17.5 ML/M2
LEFT VENTRICLE SYSTOLIC VOLUME: 26.75 ML
LEFT VENTRICULAR INTERNAL DIMENSION IN DIASTOLE: 3.52 CM (ref 3.5–6)
LEFT VENTRICULAR MASS: 68.94 G
LEUKOCYTE ESTERASE UR QL STRIP: NEGATIVE
LV LATERAL E/E' RATIO: 8.67
LV SEPTAL E/E' RATIO: 9.75
LYMPHOCYTES # BLD AUTO: 1.9 K/UL (ref 1–4.8)
LYMPHOCYTES NFR BLD: 25.7 % (ref 18–48)
MAGNESIUM SERPL-MCNC: 1.1 MG/DL (ref 1.6–2.6)
MCH RBC QN AUTO: 29.2 PG (ref 27–31)
MCHC RBC AUTO-ENTMCNC: 32.6 G/DL (ref 32–36)
MCV RBC AUTO: 90 FL (ref 82–98)
MONOCYTES # BLD AUTO: 0.7 K/UL (ref 0.3–1)
MONOCYTES NFR BLD: 9.6 % (ref 4–15)
MV PEAK A VEL: 0.95 M/S
MV PEAK E VEL: 0.78 M/S
NEUTROPHILS # BLD AUTO: 4.4 K/UL (ref 1.8–7.7)
NEUTROPHILS NFR BLD: 58.9 % (ref 38–73)
NITRITE UR QL STRIP: NEGATIVE
NRBC BLD-RTO: 0 /100 WBC
PH UR STRIP: 8 [PH] (ref 5–8)
PHOSPHATE SERPL-MCNC: 3.7 MG/DL (ref 2.7–4.5)
PISA TR MAX VEL: 2.82 M/S
PLATELET # BLD AUTO: 181 K/UL (ref 150–350)
PMV BLD AUTO: 12 FL (ref 9.2–12.9)
POCT GLUCOSE: 170 MG/DL (ref 70–110)
POTASSIUM SERPL-SCNC: 3.4 MMOL/L (ref 3.5–5.1)
PROT SERPL-MCNC: 5.9 G/DL (ref 6–8.4)
PROT UR QL STRIP: NEGATIVE
PULM VEIN S/D RATIO: 1.15
PV PEAK D VEL: 0.27 M/S
PV PEAK S VEL: 0.31 M/S
RA MAJOR: 4.71 CM
RA PRESSURE: 3 MMHG
RA WIDTH: 3.29 CM
RBC # BLD AUTO: 3.97 M/UL (ref 4–5.4)
RETIRED EF AND QEF - SEE NOTES: 65 %
RIGHT VENTRICULAR END-DIASTOLIC DIMENSION: 3.62 CM
SINUS: 3.21 CM
SODIUM SERPL-SCNC: 140 MMOL/L (ref 136–145)
SP GR UR STRIP: 1.01 (ref 1–1.03)
STJ: 2.54 CM
TDI LATERAL: 0.09
TDI SEPTAL: 0.08
TDI: 0.09
TR MAX PG: 31.81 MMHG
TRICUSPID ANNULAR PLANE SYSTOLIC EXCURSION: 1.91 CM
TV REST PULMONARY ARTERY PRESSURE: 35 MMHG
URN SPEC COLLECT METH UR: NORMAL
WBC # BLD AUTO: 7.52 K/UL (ref 3.9–12.7)

## 2019-05-14 PROCEDURE — 83735 ASSAY OF MAGNESIUM: CPT | Mod: HCNC

## 2019-05-14 PROCEDURE — 99217 PR OBSERVATION CARE DISCHARGE: ICD-10-PCS | Mod: HCNC,,, | Performed by: PHYSICIAN ASSISTANT

## 2019-05-14 PROCEDURE — 99217 PR OBSERVATION CARE DISCHARGE: CPT | Mod: HCNC,,, | Performed by: PHYSICIAN ASSISTANT

## 2019-05-14 PROCEDURE — 80053 COMPREHEN METABOLIC PANEL: CPT | Mod: HCNC

## 2019-05-14 PROCEDURE — 25000003 PHARM REV CODE 250: Mod: HCNC | Performed by: PHYSICIAN ASSISTANT

## 2019-05-14 PROCEDURE — A4216 STERILE WATER/SALINE, 10 ML: HCPCS | Mod: HCNC | Performed by: NURSE PRACTITIONER

## 2019-05-14 PROCEDURE — 84100 ASSAY OF PHOSPHORUS: CPT | Mod: HCNC

## 2019-05-14 PROCEDURE — 82962 GLUCOSE BLOOD TEST: CPT | Mod: HCNC,91

## 2019-05-14 PROCEDURE — 36415 COLL VENOUS BLD VENIPUNCTURE: CPT | Mod: HCNC

## 2019-05-14 PROCEDURE — 25000003 PHARM REV CODE 250: Mod: HCNC | Performed by: NURSE PRACTITIONER

## 2019-05-14 PROCEDURE — 85025 COMPLETE CBC W/AUTO DIFF WBC: CPT | Mod: HCNC

## 2019-05-14 PROCEDURE — G0378 HOSPITAL OBSERVATION PER HR: HCPCS | Mod: HCNC

## 2019-05-14 PROCEDURE — 81003 URINALYSIS AUTO W/O SCOPE: CPT | Mod: HCNC

## 2019-05-14 RX ORDER — LANOLIN ALCOHOL/MO/W.PET/CERES
400 CREAM (GRAM) TOPICAL ONCE
Status: COMPLETED | OUTPATIENT
Start: 2019-05-14 | End: 2019-05-14

## 2019-05-14 RX ORDER — POTASSIUM CHLORIDE 20 MEQ/1
40 TABLET, EXTENDED RELEASE ORAL ONCE
Status: COMPLETED | OUTPATIENT
Start: 2019-05-14 | End: 2019-05-14

## 2019-05-14 RX ADMIN — QUETIAPINE FUMARATE 50 MG: 25 TABLET ORAL at 03:05

## 2019-05-14 RX ADMIN — MAGNESIUM OXIDE TAB 400 MG (241.3 MG ELEMENTAL MG) 400 MG: 400 (241.3 MG) TAB at 08:05

## 2019-05-14 RX ADMIN — ACETAMINOPHEN 650 MG: 325 TABLET ORAL at 11:05

## 2019-05-14 RX ADMIN — SERTRALINE HYDROCHLORIDE 25 MG: 25 TABLET ORAL at 08:05

## 2019-05-14 RX ADMIN — Medication 3 ML: at 05:05

## 2019-05-14 RX ADMIN — SIMVASTATIN 40 MG: 40 TABLET, FILM COATED ORAL at 08:05

## 2019-05-14 RX ADMIN — POTASSIUM CHLORIDE 40 MEQ: 1500 TABLET, EXTENDED RELEASE ORAL at 08:05

## 2019-05-14 NOTE — PT/OT/SLP PROGRESS
Occupational Therapy      Patient Name:  Zeina White   MRN:  [id number]    Attempted to see patient for OT evaluation, however MD present administered ECHO during both attempts. Unable to re-attempt to see patient in PM. Will follow up tomorrow.     Padma Arreguin, OT  5/14/2019

## 2019-05-14 NOTE — DISCHARGE SUMMARY
[facility] Hospital Medicine  Discharge Summary      Patient Name: Zeina White  MRN: [id number]  Admission Date: 5/13/2019  Hospital Length of Stay: 0 days  Discharge Date and Time: 5/14/2019  4:20 PM  Attending Physician: No att. providers found   Discharging Provider: Kate Downey PA-C  Primary Care Provider: Shahnaz Gupta MD  [facility] Medicine Team: [facility] Kate Downey PA-C    HPI:   Patient, PMHx includes vertigo, dementia, anxiety, atypical chest pain, GERD, IBS, hypertension, and hyperlipidemia, admitted to hospital medicine for with near syncope. Patient's relative states that the patient has a history of syncope for years, but in the last 2-3 weeks, episodes have been more frequent, sometimes as many a 2-3 episodes/day. Relative states that patient does not completely synopsize, but complains of extreme dizziness with pain to the top of her head. Patient is able to communicate during these episodes. Patient was referred to the ED today after experiencing one such near syncope event at her internist's (Dr. Gupta) office. Patient denies any recent falls, fever, chest pain, dysuria, and any other complaints at this time.       IN ED: 500ml bolus, CXR clear, UA ordered       * No surgery found *      Hospital Course:   Mrs. White was admitted to observation for near syncope. Orthostatics negative. CTH negative for acute abnormality. TTE with EF 68%, indeterminate diastolic function, segmental wall motion abnormalities. UA unremarkable for infection. No further episodes of near syncope while in the hospital. Patient to follow up with PCP in 1 week.      Consults:     * Fatigue  Vertigo  Headache  -patient with long history of pre-syncopal episodes  - in setting of headache + HTN + pre-syncope- CTH-negative for acute abnormality  - last TTE in 2015 with normal EF and DD; repeat  TTE with EF 68%, indeterminate diastolic function, segmental wall motion  abnormalities.  - will monitor on tele overnight  - orthostatics negative  - UA unremarkable for infection  - lab work unremarkable  - meclizine PRN  - PT/OT  - no need for HH, patient has full time sitters  - PCP follow up in 1 week    Dementia with behavioral disturbance  - continue Seroquel     Type II diabetes mellitus with neurological manifestations  - hold metformin  - sliding scale    Essential hypertension  - awaiting orthostatic blood pressures prior to restarting home medications; if negative will restart   - orthostatics negative, restart bp meds    Hyperlipidemia  - statin    GERD (gastroesophageal reflux disease)  - on zantac  - will place on Protonix inpatient       Final Active Diagnoses:    Diagnosis Date Noted POA    PRINCIPAL PROBLEM:  Fatigue [R53.83] 05/13/2019 Yes    Dementia with behavioral disturbance [F03.91] 07/25/2017 Yes    Type II diabetes mellitus with neurological manifestations [E11.49] 10/28/2013 Yes    Hyperlipidemia [E78.5] 10/28/2013 Yes    Essential hypertension [I10] 10/28/2013 Yes    Vertigo [R42]  Yes    GERD (gastroesophageal reflux disease) [K21.9]  Yes      Problems Resolved During this Admission:       Discharged Condition: good    Disposition: Home or Self Care    Follow Up:  Follow-up Information     Shahnaz Gupta MD In 1 week.    Specialties:  Internal Medicine, Psychiatry  Contact information:  [facility] [address] [phone]                 Patient Instructions:      Diet diabetic     Notify your health care provider if you experience any of the following:  temperature >100.4     Notify your health care provider if you experience any of the following:  severe uncontrolled pain     Notify your health care provider if you experience any of the following:  persistent dizziness, light-headedness, or visual disturbances     Notify your health care provider if you experience any of the following:  increased confusion or weakness      Activity as tolerated       Significant Diagnostic Studies: Labs:   CMP   Recent Labs   Lab 05/13/19  1241 05/14/19  0526    140   K 3.5 3.4*    103   CO2 28 28   * 144*   BUN 13 14   CREATININE 0.7 0.7   CALCIUM 10.7* 8.9   PROT 8.0 5.9*   ALBUMIN 4.4 3.3*   BILITOT 0.4 0.3   ALKPHOS 81 61   AST 18 13   ALT 17 10   ANIONGAP 13 9   ESTGFRAFRICA >60.0 >60.0   EGFRNONAA >60.0 >60.0    and CBC   Recent Labs   Lab 05/13/19  1241 05/14/19  0526   WBC 10.54 7.52   HGB 15.5 11.6*   HCT 43.5 35.6*    181     TRANSTHORACIC ECHO (TTE) COMPLETE   Conclusion     · Normal left ventricular systolic function with concentric left ventricular remodeling. The estimated ejection fraction is 68%.  · Normal RV systolic function  · Mild mitral regurgitation.  · Mild mitral sclerosis.  · Normal central venous pressure (3 mm Hg).  · The estimated PA systolic pressure is 35 mm Hg         Pending Diagnostic Studies:     Procedure Component Value Units Date/Time    Urinalysis [[id number]] Collected:  05/14/19 1254    Order Status:  Sent Lab Status:  In process Updated:  05/14/19 1254    Specimen:  Urine          Medications:  Reconciled Home Medications:      Medication List      CONTINUE taking these medications    acetaminophen 500 MG tablet  Commonly known as:  TYLENOL  Take 500 mg by mouth 3 (three) times daily as needed for Pain.     amLODIPine 5 MG tablet  Commonly known as:  NORVASC  Take 1 tablet (5 mg total) by mouth 2 (two) times daily.     b complex vitamins tablet  Take 1 tablet by mouth once daily.     blood sugar diagnostic Strp  1 strip by Misc.(Non-Drug; Combo Route) route 2 (two) times daily. May have a 30 or 90 day supply     docusate sodium 100 MG capsule  Commonly known as:  COLACE  Take 100 mg by mouth once daily.     lisinopril 40 MG tablet  Commonly known as:  PRINIVIL,ZESTRIL  TAKE 1 TABLET(40 MG) BY MOUTH EVERY DAY     meclizine 12.5 mg tablet  Commonly known as:  ANTIVERT  Take 1 tablet  (12.5 mg total) by mouth 3 (three) times daily as needed for Dizziness.     metFORMIN 500 MG tablet  Commonly known as:  GLUCOPHAGE  TAKE 1 TABLET BY MOUTH TWICE DAILY WITH MEALS     multivitamin capsule  Take 1 capsule by mouth once daily.     QUEtiapine 25 MG Tab  Commonly known as:  SEROQUEL  TAKE 2 TABLETS BY MOUTH EVERY DAY AT 3PM AND 2 AND 1/2 TABLETS BY MOUTH EVERY EVENING     ranitidine 150 MG tablet  Commonly known as:  ZANTAC  TAKE 1 TABLET(150 MG) BY MOUTH TWICE DAILY     sertraline 25 MG tablet  Commonly known as:  ZOLOFT  Take 1 tablet (25 mg total) by mouth once daily.     simvastatin 40 MG tablet  Commonly known as:  ZOCOR  TAKE 1 TABLET BY MOUTH EVERY DAY     TRUE METRIX GLUCOSE METER Misc  Generic drug:  blood-glucose meter     TRUEPLUS LANCETS 33 gauge Misc  Generic drug:  lancets     VOLTAREN 1 % Gel  Generic drug:  diclofenac sodium  APPLY 2 GRAMS EXTERNALLY TO THE AFFECTED AREA EVERY DAY            Indwelling Lines/Drains at time of discharge:   Lines/Drains/Airways          None          Time spent on the discharge of patient: 36 minutes  Patient was seen and examined on the date of discharge and determined to be suitable for discharge.         Kate Downey PA-C  Department of Hospital Medicine  [facility]

## 2019-05-14 NOTE — ASSESSMENT & PLAN NOTE
Vertigo  Headache  -patient with long history of pre-syncopal episodes  - in setting of headache + HTN + pre-syncope- CTH-negative for acute abnormality  - last TTE in 2015 with normal EF and DD; repeat  TTE with EF 68%, indeterminate diastolic function, segmental wall motion abnormalities.  - will monitor on tele overnight  - orthostatics negative  - UA unremarkable for infection  - lab work unremarkable  - meclizine PRN  - PT/OT  - no need for HH, patient has full time sitters  - PCP follow up in 1 week

## 2019-05-14 NOTE — PLAN OF CARE
CM at bedside to discuss discharge planning assessment.   Discussed discharge needs with patient's daughter.   Patient lives with her children and has 24 hour sitters at home.   Currently has wc, walker and bedside commode.    Further discharge needs to be determined.    Shahnaz Gupta MD  [address] [facility] [address]      [place] [id number]  [place] [facility] [address] AT [facility] [place]  [facility] [place] [address]  Phone: [phone] Fax: [phone]      Extended Emergency Contact Information  Primary Emergency Contact: Destiny Franks  Address: [address] [place] [address]           [place] [address] [facility]  Home Phone: [phone]  Mobile Phone: [phone]  Relation: Daughter  Secondary Emergency Contact: Kasandra Escobar   [place]  Mobile Phone: [phone]  Relation: Daughter    No future appointments.     05/14/19 1107   Discharge Assessment   Assessment Type Discharge Planning Assessment   Confirmed/corrected address and phone number on facesheet? Yes   Assessment information obtained from?   (daughter)   Expected Length of Stay (days) 1   Prior to hospitilization cognitive status: Not Oriented to Time   Prior to hospitalization functional status: Assistive Equipment;Needs Assistance   Current cognitive status: Not Oriented to Time   Current Functional Status: Needs Assistance;Partially Dependent   Facility Arrived From: ED admit from home   Lives With child(miguel), adult;other (see comments)  (24 hour sitters)   Able to Return to Prior Arrangements yes   Is patient able to care for self after discharge? No   Who are your caregiver(s) and their phone number(s)? Destiny Franks (daughter)  [phone]   Patient's perception of discharge disposition other (comments)  (home with sitters and family)   Readmission Within the Last 30 Days no previous admission in last 30 days   Patient currently being followed by outpatient case management?  No   Patient currently receives any other outside agency services? No   Equipment Currently Used at Home wheelchair;walker, standard;bedside commode   Do you have any problems affording any of your prescribed medications? No   Is the patient taking medications as prescribed? yes   Does the patient have transportation home? Yes   Transportation Anticipated family or friend will provide   Does the patient receive services at the [facility]? No   Discharge Plan A Home with family   Discharge Plan B Home Health;Home with family   DME Needed Upon Discharge  other (see comments)  (to be determined)   Patient/Family in Agreement with Plan yes   Does the patient have transportation to healthcare appointments? Yes

## 2019-05-14 NOTE — PLAN OF CARE
Problem: Adult Inpatient Plan of Care  Goal: Plan of Care Review  Outcome: Ongoing (interventions implemented as appropriate)  Patient awake, alert and responsive, verbal, hard of hearing. Stable vital signs. No distress or complaint of pain. Provided a walker in the room. Advised daughter to call nurse for assistance. Still complaining of dizziness, unable to stand up for orthostatic blood pressure. Blood sugar checked before meal. Reviewed plan of care with patient and daughter, verbalized understanding.

## 2019-05-14 NOTE — NURSING
Pt. Off unit to CT per wendy accompanied by transporter in stable condition. Will continue to monitor pt.

## 2019-05-14 NOTE — HOSPITAL COURSE
Mrs. White was admitted to observation for near syncope. Orthostatics negative. CTH negative for acute abnormality. TTE with EF 68%, indeterminate diastolic function, segmental wall motion abnormalities. UA unremarkable for infection. No further episodes of near syncope while in the hospital. Patient to follow up with PCP in 1 week.

## 2019-05-14 NOTE — H&P
[facility] Hospital Medicine  History & Physical    Patient Name: Zeina White  MRN: [id number]  Admission Date: 5/13/2019  Attending Physician: BALWINDER Rey MD   Primary Care Provider: Shahnaz Gupta MD    [facility] Medicine Team: [facility] MED F Allie Thomas NP     Patient information was obtained from patient, relative(s) and ER records.     Subjective:     Principal Problem:Fatigue    Chief Complaint:   Chief Complaint   Patient presents with    Fatigue     sent from im clinic, slumped over no loc , been happening for past 2 weeks        HPI: Patient, PMHx includes vertigo, dementia, anxiety, atypical chest pain, GERD, IBS, hypertension, and hyperlipidemia, admitted to hospital medicine for with near syncope. Patient's relative states that the patient has a history of syncope for years, but in the last 2-3 weeks, episodes have been more frequent, sometimes as many a 2-3 episodes/day. Relative states that patient does not completely synopsize, but complains of extreme dizziness with pain to the top of her head. Patient is able to communicate during these episodes. Patient was referred to the ED today after experiencing one such near syncope event at her internist's (Dr. Gupta) office. Patient denies any recent falls, fever, chest pain, dysuria, and any other complaints at this time.       IN ED: 500ml bolus, CXR clear, UA ordered       Past Medical History:   Diagnosis Date    Anxiety     Atypical chest pain     Dementia     Diabetes mellitus     DJD (degenerative joint disease)     GERD (gastroesophageal reflux disease)     Hyperlipidemia     Hypertension     Irritable bowel     Tremor, essential     Vertigo        Past Surgical History:   Procedure Laterality Date    HYSTERECTOMY      IM NAILING OF FEMUR Left 3/21/2017    Performed by [facility] at [facility]    JOINT REPLACEMENT      L total knee    KNEE SURGERY      bilat    QUADRICEPS REPAIR       R       Review of patient's allergies indicates:   Allergen Reactions    Glimepiride      Other reaction(s): tremor  Other reaction(s): tremor    Naproxen      Other reaction(s): Jittery  Other reaction(s): Jittery       No current facility-administered medications on file prior to encounter.      Current Outpatient Medications on File Prior to Encounter   Medication Sig    acetaminophen (TYLENOL) 500 MG tablet Take 500 mg by mouth 3 (three) times daily as needed for Pain.    amLODIPine (NORVASC) 5 MG tablet Take 1 tablet (5 mg total) by mouth 2 (two) times daily.    b complex vitamins tablet Take 1 tablet by mouth once daily.    docusate sodium (COLACE) 100 MG capsule Take 100 mg by mouth once daily.    lisinopril (PRINIVIL,ZESTRIL) 40 MG tablet TAKE 1 TABLET(40 MG) BY MOUTH EVERY DAY    meclizine (ANTIVERT) 12.5 mg tablet Take 1 tablet (12.5 mg total) by mouth 3 (three) times daily as needed for Dizziness.    metFORMIN (GLUCOPHAGE) 500 MG tablet TAKE 1 TABLET BY MOUTH TWICE DAILY WITH MEALS    multivitamin capsule Take 1 capsule by mouth once daily.    QUEtiapine (SEROQUEL) 25 MG Tab TAKE 2 TABLETS BY MOUTH EVERY DAY AT 3PM AND 2 AND 1/2 TABLETS BY MOUTH EVERY EVENING    ranitidine (ZANTAC) 150 MG tablet TAKE 1 TABLET(150 MG) BY MOUTH TWICE DAILY    sertraline (ZOLOFT) 25 MG tablet Take 1 tablet (25 mg total) by mouth once daily.    simvastatin (ZOCOR) 40 MG tablet TAKE 1 TABLET BY MOUTH EVERY DAY    blood sugar diagnostic Strp 1 strip by Misc.(Non-Drug; Combo Route) route 2 (two) times daily. May have a 30 or 90 day supply    TRUE METRIX GLUCOSE METER Misc     TRUEPLUS LANCETS 33 gauge Misc     VOLTAREN 1 % Gel APPLY 2 GRAMS EXTERNALLY TO THE AFFECTED AREA EVERY DAY     Family History     Problem Relation (Age of Onset)    Alcohol abuse Brother    Cancer Mother    Diabetes Father    Parkinsonism Father        Tobacco Use    Smoking status: Former Smoker     Types: Cigarettes     Last attempt  to quit: 8/3/1957     Years since quittin.8    Smokeless tobacco: Never Used    Tobacco comment: Sociall in her 30's   Substance and Sexual Activity    Alcohol use: No    Drug use: No    Sexual activity: Never     Review of Systems   Constitutional: Negative for chills, fatigue and fever.   Respiratory: Negative for cough, chest tightness, shortness of breath and wheezing.    Cardiovascular: Negative for chest pain, palpitations and leg swelling.   Gastrointestinal: Negative for abdominal pain, constipation, diarrhea, nausea and vomiting.   Genitourinary: Negative for dysuria, flank pain and hematuria.   Skin: Negative for color change and rash.   Neurological: Positive for light-headedness and headaches. Negative for dizziness and weakness.     Objective:     Vital Signs (Most Recent):  Temp: 98.6 °F (37 °C) (19 1132)  Pulse: 85 (19 1436)  Resp: (!) 23 (19 1436)  BP: (!) 161/74 (19 1436)  SpO2: 97 % (19 1436) Vital Signs (24h Range):  Temp:  [98.6 °F (37 °C)] 98.6 °F (37 °C)  Pulse:  [] 85  Resp:  [18-23] 23  SpO2:  [95 %-97 %] 97 %  BP: (151-175)/(70-96) 161/74     Weight: 66.7 kg (147 lb)  Body mass index is 32.96 kg/m².    Physical Exam   Constitutional: She is oriented to person, place, and time. She appears well-developed and well-nourished. No distress.   Patient very hard of hearing    HENT:   Head: Normocephalic and atraumatic.   Mouth/Throat: Mucous membranes are normal.   Eyes: Pupils are equal, round, and reactive to light. Conjunctivae and EOM are normal.   Neck: Trachea normal. No JVD present. No tracheal deviation present.   Cardiovascular: Normal rate and regular rhythm. Exam reveals no gallop and no friction rub.   No murmur heard.  Pulmonary/Chest: Effort normal and breath sounds normal. No stridor. No respiratory distress. She has no wheezes. She has no rhonchi. She has no rales. She exhibits no tenderness.   Abdominal: Soft. Bowel sounds are normal.  She exhibits no distension, no ascites and no mass. There is no tenderness. There is no guarding.   Musculoskeletal: She exhibits no edema or deformity.   Neurological: She is alert and oriented to person, place, and time. No cranial nerve deficit.   Skin: Skin is warm, dry and intact. Capillary refill takes less than 2 seconds. No rash noted. She is not diaphoretic. No cyanosis or erythema. Nails show no clubbing.   Psychiatric: She has a normal mood and affect. Her speech is normal and behavior is normal. Judgment and thought content normal. Cognition and memory are normal.   Vitals reviewed.        CRANIAL NERVES     CN III, IV, VI   Pupils are equal, round, and reactive to light.  Extraocular motions are normal.        Significant Labs:   CBC:   Recent Labs   Lab 05/13/19  1241   WBC 10.54   HGB 15.5   HCT 43.5        CMP:   Recent Labs   Lab 05/13/19  1241      K 3.5      CO2 28   *   BUN 13   CREATININE 0.7   CALCIUM 10.7*   PROT 8.0   ALBUMIN 4.4   BILITOT 0.4   ALKPHOS 81   AST 18   ALT 17   ANIONGAP 13   EGFRNONAA >60.0       Significant Imaging: I have reviewed all pertinent imaging results/findings within the past 24 hours.    Assessment/Plan:     * Fatigue  Vertigo  Headache  -patient with long history of pre-syncopal episodes  - in setting of headache + HTN + pre-syncope- CTH-pending  - last TTE in 2015 with normal EF and DD; repeat pending    - will monitor on tele overnight  - orthostatics pending  - UA pending  - lab work unremarkable  - meclizine PRN  - PT/OT    Dementia with behavioral disturbance  - continue Seroquel       Type II diabetes mellitus with neurological manifestations  - hold metformin  - sliding scale      Essential hypertension  - awaiting orthostatic blood pressures prior to restarting home medications; if negative will restart       Hyperlipidemia  - statin      GERD (gastroesophageal reflux disease)  - on zantac  - will place on Protonix inpatient          VTE Risk Mitigation (From admission, onward)        Ordered     IP VTE HIGH RISK PATIENT  Once      05/13/19 1631     Place JOSI hose  Until discontinued      05/13/19 1631     Place sequential compression device  Until discontinued      05/13/19 1631     Reason for No Pharmacological VTE Prophylaxis  Once      05/13/19 1631             Allie Thomas NP  Department of Hospital Medicine   [facility]

## 2019-05-14 NOTE — PLAN OF CARE
Problem: Adult Inpatient Plan of Care  Goal: Plan of Care Review  Pt in bed resting. Caregiver at bedside. Intermittent confusion noted, reoriented to time frequently. Able to voice needs. Ambulatory with walker. No reports of dizziness or syncopal episodes noted this shift. Pt safety maintained. Will continue to monitor.

## 2019-05-14 NOTE — NURSING
Pt and pt's daughter explained discharge instructions, verbalized understanding. IV removed. Awaiting PA to speak with pt's daughter on phone then wheelchair escort will be ordered to transfer pt with daughter to [facility]. Tammy Woodard RN

## 2019-05-14 NOTE — PLAN OF CARE
Problem: Fall Injury Risk  Goal: Absence of Fall and Fall-Related Injury  Outcome: Ongoing (interventions implemented as appropriate)  Pt's call light in reach, daughter at bedside, side rails raised x 3, fall band risk and non-skid socks in place. Tammy Woodard RN

## 2019-05-14 NOTE — ASSESSMENT & PLAN NOTE
- awaiting orthostatic blood pressures prior to restarting home medications; if negative will restart   - orthostatics negative, restart bp meds

## 2019-05-15 LAB — POCT GLUCOSE: 167 MG/DL (ref 70–110)

## 2019-05-17 ENCOUNTER — TELEPHONE (OUTPATIENT)
Dept: INTERNAL MEDICINE | Facility: CLINIC | Age: 84
End: 2019-05-17

## 2019-05-17 NOTE — TELEPHONE ENCOUNTER
----- Message from Oneyda Johansen sent at 5/17/2019  7:53 AM CDT -----  Contact: [facility] request  Message     ----- Message from [facility], System Message sent at 5/15/2019  7:26 PM CDT -----    Appointment Request From: Zeina White    With Provider: Shahnaz Gupta MD [[facility] - Internal Medicine]    Preferred Date Range: 5/16/2019 - 5/31/2019    Preferred Times: Any time    Reason for visit: Existing Patient    Comments:  This message is being sent by Destiny Mak on behalf of ZEINA White.  Followup to  ED visit on 5/13/19 when sent by Dr Gupta.

## 2019-05-21 ENCOUNTER — PATIENT MESSAGE (OUTPATIENT)
Dept: INTERNAL MEDICINE | Facility: CLINIC | Age: 84
End: 2019-05-21

## 2019-07-15 ENCOUNTER — OFFICE VISIT (OUTPATIENT)
Dept: INTERNAL MEDICINE | Facility: CLINIC | Age: 84
End: 2019-07-15
Payer: MEDICARE

## 2019-07-15 ENCOUNTER — TELEPHONE (OUTPATIENT)
Dept: INTERNAL MEDICINE | Facility: CLINIC | Age: 84
End: 2019-07-15

## 2019-07-15 VITALS — SYSTOLIC BLOOD PRESSURE: 142 MMHG | OXYGEN SATURATION: 99 % | HEART RATE: 94 BPM | DIASTOLIC BLOOD PRESSURE: 68 MMHG

## 2019-07-15 DIAGNOSIS — K58.1 IRRITABLE BOWEL SYNDROME WITH CONSTIPATION: ICD-10-CM

## 2019-07-15 DIAGNOSIS — H91.93 HEARING DIFFICULTY OF BOTH EARS: ICD-10-CM

## 2019-07-15 DIAGNOSIS — E11.49 TYPE II DIABETES MELLITUS WITH NEUROLOGICAL MANIFESTATIONS: ICD-10-CM

## 2019-07-15 DIAGNOSIS — R60.0 BILATERAL LOWER EXTREMITY EDEMA: ICD-10-CM

## 2019-07-15 DIAGNOSIS — E78.5 HYPERLIPIDEMIA, UNSPECIFIED HYPERLIPIDEMIA TYPE: ICD-10-CM

## 2019-07-15 DIAGNOSIS — F03.91 DEMENTIA WITH BEHAVIORAL DISTURBANCE, UNSPECIFIED DEMENTIA TYPE: ICD-10-CM

## 2019-07-15 DIAGNOSIS — M47.812 OSTEOARTHRITIS OF CERVICAL SPINE, UNSPECIFIED SPINAL OSTEOARTHRITIS COMPLICATION STATUS: ICD-10-CM

## 2019-07-15 DIAGNOSIS — I10 ESSENTIAL HYPERTENSION: ICD-10-CM

## 2019-07-15 DIAGNOSIS — F33.42 RECURRENT MAJOR DEPRESSIVE DISORDER, IN FULL REMISSION: ICD-10-CM

## 2019-07-15 DIAGNOSIS — R42 DIZZINESS: Primary | ICD-10-CM

## 2019-07-15 DIAGNOSIS — I70.0 AORTIC ATHEROSCLEROSIS: ICD-10-CM

## 2019-07-15 DIAGNOSIS — M81.0 OSTEOPOROSIS, UNSPECIFIED OSTEOPOROSIS TYPE, UNSPECIFIED PATHOLOGICAL FRACTURE PRESENCE: ICD-10-CM

## 2019-07-15 DIAGNOSIS — Q99.2 FRAGILE X SYNDROME: ICD-10-CM

## 2019-07-15 DIAGNOSIS — K21.9 GASTROESOPHAGEAL REFLUX DISEASE, ESOPHAGITIS PRESENCE NOT SPECIFIED: ICD-10-CM

## 2019-07-15 DIAGNOSIS — M17.0 PRIMARY OSTEOARTHRITIS OF BOTH KNEES: ICD-10-CM

## 2019-07-15 DIAGNOSIS — M54.2 CHRONIC NECK PAIN: ICD-10-CM

## 2019-07-15 DIAGNOSIS — Z87.898 HISTORY OF SYNCOPE: ICD-10-CM

## 2019-07-15 DIAGNOSIS — G89.29 CHRONIC NECK PAIN: ICD-10-CM

## 2019-07-15 DIAGNOSIS — E53.8 B12 DEFICIENCY: ICD-10-CM

## 2019-07-15 PROCEDURE — 1101F PR PT FALLS ASSESS DOC 0-1 FALLS W/OUT INJ PAST YR: ICD-10-PCS | Mod: HCNC,CPTII,S$GLB, | Performed by: INTERNAL MEDICINE

## 2019-07-15 PROCEDURE — 99214 PR OFFICE/OUTPT VISIT, EST, LEVL IV, 30-39 MIN: ICD-10-PCS | Mod: HCNC,S$GLB,, | Performed by: INTERNAL MEDICINE

## 2019-07-15 PROCEDURE — 1101F PT FALLS ASSESS-DOCD LE1/YR: CPT | Mod: HCNC,CPTII,S$GLB, | Performed by: INTERNAL MEDICINE

## 2019-07-15 PROCEDURE — 99999 PR PBB SHADOW E&M-EST. PATIENT-LVL III: ICD-10-PCS | Mod: PBBFAC,HCNC,, | Performed by: INTERNAL MEDICINE

## 2019-07-15 PROCEDURE — 99999 PR PBB SHADOW E&M-EST. PATIENT-LVL III: CPT | Mod: PBBFAC,HCNC,, | Performed by: INTERNAL MEDICINE

## 2019-07-15 PROCEDURE — 99214 OFFICE O/P EST MOD 30 MIN: CPT | Mod: HCNC,S$GLB,, | Performed by: INTERNAL MEDICINE

## 2019-07-15 NOTE — PROGRESS NOTES
Internal Medicine    Subjective:      Patient ID: Zeina White is a 89 y.o. female.    Chief Complaint: Follow-up    HPI:  Patient presents for follow up appointment.  The patient's last visit with me was on 5/13/2019.    Presyncopal versus syncopal episodes:  Hospitalized 5/13/19 to 5/14/19.  CT head without changes.  Orthostatics negative.  Echo with EF 68%.  UA negative.  Patient has had these symptoms entire life but had worsened recently prior to admission.  Not occurring as often per daughter since discharge.  Did not start with initiation of Seroquel.  Dizziness comes at random times - even when lying down.      Neck pain, headaches:  Has been diagnosed with cervical OA in the past.  No recent x-ray in our system.  Takes Tylenol arthritis 650mg PRN, which is helpful.  Occasionally will take Ibuprofen 200mg which is also helpful.  Patient reports neck pain usually occurs when lying down.     HTN:  Taking Norvasc 10mg daily and lisinopril 40mg daily.  BP occasionally still in 140's.  Tolerating slightly higher blood pressure due to age and presyncope.       HLD:  Taking simvistatin 40mg daily.  Denies side effects.     DM2:  Taking metformin 500mg BID.  Last HgA1c 7.8 on 5/13/19.  FSG usually 120-150's, occasional 160-170.       Bilateral knee OA (R>L):  Saw MAGDALENA Howell on 4/13/18.  Given steroid injection.  Unsure if it was helpful.  Voltaren gel is very helpful.       IBS, predominantly constipation:  Now using Colace as needed and avoiding frequent use of laxatives.  Has fecal urgency when she leaves the house, presumably from stress/anxiety.  Has diffuse abdominal cramping that responds well to Gas-X.     Dementia with behavioral disturbance, depression:  Patient has 24/7 sitters.  Followed by Neurology (Dr. Churchill) - last seen 12/20/17.  Taking Zoloft 25mg daily and and Seroquel 50mg at 3pm + 62.5mg at 6pm.      GERD:  Takes Zantac 150mg BID.        Osteoporosis:  Last DEXA in 2009 showing  osteoporosis of hip, recommendation was repeat in 2 years.  Family has declined repeat DEXA, treatment for osteoporosis, and Endocrine referral.     Vit B12 deficiency:  Taking B12 500mcg daily.     Macular degeneration:  Sees Ophthalmology.  Taking Preservision.       Seeing audiologist for hearing aids.    Fragile X Syndrome.       Review of Systems   Constitutional: Negative for appetite change, chills, fatigue, fever and unexpected weight change.   HENT: Negative for sore throat and trouble swallowing.    Eyes: Negative for visual disturbance.   Respiratory: Negative for cough, chest tightness, shortness of breath and wheezing.    Cardiovascular: Positive for leg swelling (Chronic, bilateral). Negative for chest pain and palpitations.   Gastrointestinal: Positive for abdominal pain (Not currently, improves with Gas-X). Negative for blood in stool, constipation, diarrhea, nausea and vomiting.   Genitourinary: Negative for difficulty urinating.   Musculoskeletal: Negative for arthralgias and myalgias.   Skin: Negative for rash and wound.   Neurological: Positive for dizziness (Chronic, not currently) and headaches. Negative for weakness and numbness.   Psychiatric/Behavioral: Negative for behavioral problems and confusion.       Past medical history, surgical history, and family medical history reviewed and updated as appropriate.    Medications and allergies reviewed.     Objective:     Vitals:    07/15/19 1420   BP: (!) 142/68   BP Location: Right arm   Patient Position: Sitting   BP Method: Medium (Manual)   Pulse: 94   SpO2: 99%     Physical Exam   Constitutional: She appears well-developed and well-nourished. No distress.   Sitting in wheelchair, elderly female   HENT:   Head: Normocephalic and atraumatic.   Eyes: Pupils are equal, round, and reactive to light. Conjunctivae and EOM are normal. No scleral icterus.   Cardiovascular: Normal rate, regular rhythm and normal heart sounds. Exam reveals no gallop and  no friction rub.   No murmur heard.  Pulmonary/Chest: Effort normal and breath sounds normal. No respiratory distress.   Abdominal: She exhibits no distension and no mass. There is tenderness (Very mild, diffusely). There is no rebound and no guarding.   Musculoskeletal: She exhibits no edema or tenderness.   Neurological: She is alert.   Chronic difficulty hearing   Psychiatric: She has a normal mood and affect. Her behavior is normal.       RESULTS:   Lab Results   Component Value Date    WBC 7.52 05/14/2019    HGB 11.6 (L) 05/14/2019    HCT 35.6 (L) 05/14/2019    MCV 90 05/14/2019     05/14/2019     BMP  Lab Results   Component Value Date     05/14/2019    K 3.4 (L) 05/14/2019     05/14/2019    CO2 28 05/14/2019    BUN 14 05/14/2019    CREATININE 0.7 05/14/2019    CALCIUM 8.9 05/14/2019    ANIONGAP 9 05/14/2019    ESTGFRAFRICA >60.0 05/14/2019    EGFRNONAA >60.0 05/14/2019     Lab Results   Component Value Date    ALT 10 05/14/2019    AST 13 05/14/2019    ALKPHOS 61 05/14/2019    BILITOT 0.3 05/14/2019     Lab Results   Component Value Date    TSH 1.996 05/13/2019     Lab Results   Component Value Date    HGBA1C 7.8 (H) 05/13/2019         Assessment:     1. Dizziness    2. History of syncope    3. Chronic neck pain    4. Osteoarthritis of cervical spine, unspecified spinal osteoarthritis complication status    5. Essential hypertension    6. Hyperlipidemia, unspecified hyperlipidemia type    7. Aortic atherosclerosis    8. Type II diabetes mellitus with neurological manifestations    9. Dementia with behavioral disturbance, unspecified dementia type    10. Recurrent major depressive disorder, in full remission    11. B12 deficiency    12. Gastroesophageal reflux disease, esophagitis presence not specified    13. Irritable bowel syndrome with constipation    14. Osteoporosis, unspecified osteoporosis type, unspecified pathological fracture presence    15. Primary osteoarthritis of both knees     16. Bilateral lower extremity edema    17. Hearing difficulty of both ears    18. Fragile X syndrome        Plan:     *Continue medication/plan as discussed in HPI except for changes discussed below.    Zeina was seen today for follow-up.    Diagnoses and all orders for this visit:    Dizziness  History of syncope   If symptoms worsen again, could consider changing Seroquel however at this point it seems unlikely that is causing her symptoms (which have been present many years).     Chronic neck pain  Osteoarthritis of cervical spine   Discussed use of Tylenol, Advil, or Aleve PRN.  She will also try using Voltaren gel.    Essential hypertension    Hyperlipidemia, unspecified hyperlipidemia type    Aortic atherosclerosis    Type II diabetes mellitus with neurological manifestations    Dementia with behavioral disturbance, unspecified dementia type    Recurrent major depressive disorder, in full remission    B12 deficiency    Gastroesophageal reflux disease, esophagitis presence not specified    Irritable bowel syndrome with constipation    Osteoporosis, unspecified osteoporosis type, unspecified pathological fracture presence    Primary osteoarthritis of both knees    Bilateral lower extremity edema    Hearing difficulty of both ears    Fragile X syndrome    Health maintenance reviewed with patient.     Follow up in about 6 months (around 1/15/2020).    Shahnaz Gupta MD  Internal Medicine  [facility]

## 2019-07-15 NOTE — TELEPHONE ENCOUNTER
Patient got notice that Voltaren gel will no longer be covered.  Please contact insurance and find out other alternatives that are cover or if PA is needed.  Patient has severe OA which is responsive to Voltaren gel.

## 2019-07-15 NOTE — PATIENT INSTRUCTIONS
Pain control:  1. Tylenol (acetaminophen - do not take more than 3000mg per day; Avoid if you have liver disease)  2. Advil (ibuprofen - do not take more than 1200mg per day; Avoid if you have kidney disease)  3. Aleve (naproxen - do not take more than 4 tablets per day and do not take with Advil/Motrin/ibuprofen)

## 2019-07-16 NOTE — TELEPHONE ENCOUNTER
Spoke with pharmacist, generic version of Voltaren 1% gel is covered by insurance with copay of $28.50

## 2019-07-18 DIAGNOSIS — M17.0 PRIMARY OSTEOARTHRITIS OF BOTH KNEES: Primary | ICD-10-CM

## 2019-07-18 RX ORDER — DICLOFENAC SODIUM 10 MG/G
2 GEL TOPICAL DAILY
Qty: 100 G | Refills: 11 | Status: SHIPPED | OUTPATIENT
Start: 2019-07-18

## 2019-07-18 NOTE — TELEPHONE ENCOUNTER
Please contact daughter and let her know.  I have sent another prescription to [facility] to make sure they have generic on file.

## 2019-08-05 RX ORDER — SIMVASTATIN 40 MG/1
TABLET, FILM COATED ORAL
Qty: 90 TABLET | Refills: 3 | Status: SHIPPED | OUTPATIENT
Start: 2019-08-05 | End: 2020-08-02 | Stop reason: SDUPTHER

## 2019-08-14 ENCOUNTER — PATIENT MESSAGE (OUTPATIENT)
Dept: INTERNAL MEDICINE | Facility: CLINIC | Age: 84
End: 2019-08-14

## 2019-08-14 DIAGNOSIS — E11.49 TYPE II DIABETES MELLITUS WITH NEUROLOGICAL MANIFESTATIONS: Primary | ICD-10-CM

## 2019-08-14 RX ORDER — BLOOD-GLUCOSE METER
EACH MISCELLANEOUS
Qty: 1 EACH | Refills: 0 | Status: SHIPPED | OUTPATIENT
Start: 2019-08-14

## 2019-08-20 RX ORDER — METFORMIN HYDROCHLORIDE 500 MG/1
TABLET ORAL
Qty: 180 TABLET | Refills: 3 | Status: SHIPPED | OUTPATIENT
Start: 2019-08-20 | End: 2020-02-06

## 2019-09-23 RX ORDER — LISINOPRIL 40 MG/1
TABLET ORAL
Qty: 90 TABLET | Refills: 3 | Status: SHIPPED | OUTPATIENT
Start: 2019-09-23 | End: 2020-08-02 | Stop reason: SDUPTHER

## 2019-09-30 DIAGNOSIS — I10 ESSENTIAL HYPERTENSION: ICD-10-CM

## 2019-09-30 RX ORDER — AMLODIPINE BESYLATE 5 MG/1
TABLET ORAL
Qty: 180 TABLET | Refills: 3 | Status: SHIPPED | OUTPATIENT
Start: 2019-09-30 | End: 2020-08-02 | Stop reason: SDUPTHER

## 2019-10-01 ENCOUNTER — PATIENT MESSAGE (OUTPATIENT)
Dept: INTERNAL MEDICINE | Facility: CLINIC | Age: 84
End: 2019-10-01

## 2019-12-18 ENCOUNTER — PATIENT OUTREACH (OUTPATIENT)
Dept: ADMINISTRATIVE | Facility: OTHER | Age: 84
End: 2019-12-18

## 2019-12-18 DIAGNOSIS — E11.49 TYPE II DIABETES MELLITUS WITH NEUROLOGICAL MANIFESTATIONS: Primary | ICD-10-CM

## 2019-12-19 DIAGNOSIS — M79.671 RIGHT FOOT PAIN: Primary | ICD-10-CM

## 2019-12-20 ENCOUNTER — PATIENT MESSAGE (OUTPATIENT)
Dept: ORTHOPEDICS | Facility: CLINIC | Age: 84
End: 2019-12-20

## 2019-12-20 ENCOUNTER — HOSPITAL ENCOUNTER (OUTPATIENT)
Dept: RADIOLOGY | Facility: HOSPITAL | Age: 84
Discharge: HOME | End: 2019-12-20
Attending: ORTHOPAEDIC SURGERY
Payer: MEDICARE

## 2019-12-20 ENCOUNTER — OFFICE VISIT (OUTPATIENT)
Dept: ORTHOPEDICS | Facility: CLINIC | Age: 84
End: 2019-12-20
Payer: MEDICARE

## 2019-12-20 VITALS — WEIGHT: 140 LBS | BODY MASS INDEX: 31.49 KG/M2 | HEIGHT: 56 IN

## 2019-12-20 DIAGNOSIS — M79.671 RIGHT FOOT PAIN: ICD-10-CM

## 2019-12-20 DIAGNOSIS — M20.21 HALLUX RIGIDUS, RIGHT FOOT: ICD-10-CM

## 2019-12-20 DIAGNOSIS — M20.11 HALLUX VALGUS, RIGHT: Primary | ICD-10-CM

## 2019-12-20 PROCEDURE — 99999 PR PBB SHADOW E&M-EST. PATIENT-LVL III: ICD-10-PCS | Mod: PBBFAC,HCNC,, | Performed by: ORTHOPAEDIC SURGERY

## 2019-12-20 PROCEDURE — 99999 PR PBB SHADOW E&M-EST. PATIENT-LVL III: CPT | Mod: PBBFAC,HCNC,, | Performed by: ORTHOPAEDIC SURGERY

## 2019-12-20 PROCEDURE — 1159F MED LIST DOCD IN RCRD: CPT | Mod: HCNC,S$GLB,, | Performed by: ORTHOPAEDIC SURGERY

## 2019-12-20 PROCEDURE — 99214 OFFICE O/P EST MOD 30 MIN: CPT | Mod: HCNC,S$GLB,, | Performed by: ORTHOPAEDIC SURGERY

## 2019-12-20 PROCEDURE — 1101F PT FALLS ASSESS-DOCD LE1/YR: CPT | Mod: HCNC,CPTII,S$GLB, | Performed by: ORTHOPAEDIC SURGERY

## 2019-12-20 PROCEDURE — 1101F PR PT FALLS ASSESS DOC 0-1 FALLS W/OUT INJ PAST YR: ICD-10-PCS | Mod: HCNC,CPTII,S$GLB, | Performed by: ORTHOPAEDIC SURGERY

## 2019-12-20 PROCEDURE — 73630 X-RAY EXAM OF FOOT: CPT | Mod: 26,HCNC,RT, | Performed by: RADIOLOGY

## 2019-12-20 PROCEDURE — 99214 PR OFFICE/OUTPT VISIT, EST, LEVL IV, 30-39 MIN: ICD-10-PCS | Mod: HCNC,S$GLB,, | Performed by: ORTHOPAEDIC SURGERY

## 2019-12-20 PROCEDURE — 1159F PR MEDICATION LIST DOCUMENTED IN MEDICAL RECORD: ICD-10-PCS | Mod: HCNC,S$GLB,, | Performed by: ORTHOPAEDIC SURGERY

## 2019-12-20 PROCEDURE — 73630 XR FOOT COMPLETE 3 VIEW RIGHT: ICD-10-PCS | Mod: 26,HCNC,RT, | Performed by: RADIOLOGY

## 2019-12-20 PROCEDURE — 73630 X-RAY EXAM OF FOOT: CPT | Mod: TC,HCNC,RT

## 2019-12-20 PROCEDURE — 1125F AMNT PAIN NOTED PAIN PRSNT: CPT | Mod: HCNC,S$GLB,, | Performed by: ORTHOPAEDIC SURGERY

## 2019-12-20 PROCEDURE — 1125F PR PAIN SEVERITY QUANTIFIED, PAIN PRESENT: ICD-10-PCS | Mod: HCNC,S$GLB,, | Performed by: ORTHOPAEDIC SURGERY

## 2019-12-20 RX ORDER — LIDOCAINE 50 MG/G
1 PATCH TOPICAL DAILY
Qty: 28 PATCH | Refills: 0 | Status: SHIPPED | OUTPATIENT
Start: 2019-12-20 | End: 2020-10-01

## 2019-12-20 NOTE — LETTER
December 23, 2019      Shahnaz Gupta MD  [address] [address]           [facility] - Orthopedics  [address]  [facility] [address]  Phone: [phone]          Patient: Zeina White   MR Number: [id number]   YOB: 1927   Date of Visit: 12/20/2019     Dear Dr. Shahnaz Gupta,    Thank you for your referral of Zeina White for evaluation of their severe hallux valgus deformity.  Please see attached consultation note for details regarding our visit.    I appreciate the opportunity to participate in the care of our mutual patient.  Please do not hesitate to reach out with questions/concerns.    Sincerely,    Ellie Curry MD  Foot & Ankle Orthopedic Surgery  12/23/2019  [phone]

## 2019-12-20 NOTE — PATIENT INSTRUCTIONS
Today, you saw Dr. Curry and were diagnosed with severe bunion with severe arthritis    We decided the next step(s) in in treatment is/are   Trying to pad the area and prevent ulcers   Treating the pain - NSAIDs, + APAP, + topical patches/ointments   Last resort would be surgery - we would need to remove the joint.  Main reason to consider this is concern about skin breakdown/sores over the area    Thank you for allowing me to participate in your care.  We will see you back as needed to discuss next steps in treatment if current treatment plan does not provide relief.

## 2019-12-23 NOTE — PROGRESS NOTES
Subjective:   Chief complaint:   Chief Complaint   Patient presents with    Right Foot - Pain       HPI:   Zeina White is a 89 y.o. female who presents today for evaluation of right foot pain.  Rates pain as 5/10.  Pain has been ongoing for several years.  Inciting event: none known.  Treatments tried:  Shoe modifications topical remedies.    89-year-old female with past medical history significant for recent right hip fracture complicated by postop mental status changes/psychosis and diabetes presents today with her daughter for evaluation of worsening right foot pain and deformity.  She notes the bunion has been worsening for many years.  It is associated with pain over the medial eminence.  She has difficulties with shoe wear.  She ambulates the assist of a walker.  She lives at home with her adult children that also has special needs.  She denies any history of ulcerations or skin breakdown over the area.    ROS:  Musculoskeletal: per HPI  Constitutional: Negative for fever  Cardiovascular: Negative for chest pain  Respiratory: Negative for shortness of breath  Skin: Negative for ulcers or lesions  Neurological: Negative for burning, tingling and numbness  Vascular: Negative for peripheral edema  Heme: Negative for chronic anticoagulation; Negative for history of blood clot  Endocrine:  Positive for diabetes  Immune: Negative for inflammatory arthritis  /Nephrology: Negative for ESRD-on hemodialysis       Objective:   Exam:  There were no vitals filed for this visit.  General: No acute distress, well-appearing  Neurologic: Alert and oriented x3  Psychiatric: Appropriate mood and affect, cooperative  Cardiovascular: Regular pulse  Respiratory: Breathing on room air  Skin: No rashes or ulcers  Vascular:  Bilateral feet with palpable dorsalis pedis and posterior tibial pulses.  Musculoskeletal:  Standing examination deferred. On the right there is a severe hallux valgus deformity.  It is rigid and not  passively correctable.  Hallux MP joint motion is limited, but minimally irritable.  She has exquisite tenderness to palpation over the medial eminence.  No abnormal plantar callus noted and nontender over the lesser metatarsal heads.  Gastrocsoleus complex is flexible.  Ankle range of motion is maintained and irritable.  Fires all motor groups.  Sensation intact light touch throughout superficial peroneal, deep peroneal, tibial nerve distributions.    Imaging:  Radiographs were ordered, obtained and personally reviewed today.    Three views nonweightbearing due to inability to tolerate weight-bearing exam demonstrates severe hallux valgus deformity with complete loss of the joint space. The proximal phalanx is almost entirely laterally subluxed on the metatarsal head.    Additional records/labs reviewed:  Hemoglobin A1c 7.4 (feb 2019)      Assessment:     1. Hallux valgus, right    2. Hallux rigidus, right foot         I reviewed imaging, clinical history, and diagnosis as above with the patient. I attempted to use layman's terms to educate the patient as well as utilize foot models and/or pictures.   I personally went through imaging with the patient.  This is a difficult and challenging problem.  Most notably, the patient's age and history of acute dementia/psychosis associated with prior surgery make surgery for this problem risky.  Unfortunately, there are not great nonsurgical treatment options for a rigid deformity of this severity.  Discussed various topical remedies for symptom relief as well as shoe modifications and doughnut off  pads.  I emphasized that surgery would be reserved for refractory symptoms and would require an understanding of the risks.  The only surgery that I think would be reasonable to consider would be a Franks resection arthroplasty as that would involve the least amount of down time and the least risk of postop complications.  Notably I would recommend against hallux MPF due to  risk of nonunion as well as risk of infection and/or challenges with heel weight bearing compliance. Franks resection could be done under a regional anesthetic to limit anesthesia to the patient.    I did briefly discuss with the patient her daughter to that a deformity of the severity with the skin are ready red and irritated over the area is at risk of breakdown.  Diligent attention needs to be paid to skin care and offloading of this area. If at any point a wound were to appear, would need to intervene aggressively to prevent infection.  A wound may force our hand for considering resection arthroplasty as well.       Plan:       1.  Therapy: None  2.  Symptomatic treatment: Rx provided: lidocaine patches  3.  Restrictions: Advance activity as tolerated, use pain as guide  4.  Brace/orthotics/etc: Wide toe box shoes, open sandals/postop shoes, off- pads  5.  Follow-up: PRN     No orders of the defined types were placed in this encounter.      Past Medical History:   Diagnosis Date    Anxiety     Atypical chest pain     Dementia     Diabetes mellitus     DJD (degenerative joint disease)     GERD (gastroesophageal reflux disease)     Hyperlipidemia     Hypertension     Irritable bowel     Tremor, essential     Vertigo        Past Surgical History:   Procedure Laterality Date    HYSTERECTOMY      JOINT REPLACEMENT      L total knee    KNEE SURGERY      bilat    QUADRICEPS REPAIR      R       Family History   Problem Relation Age of Onset    Cancer Mother         kidney    Diabetes Father     Parkinsonism Father     Alcohol abuse Brother        Social History     Socioeconomic History    Marital status:      Spouse name: Not on file    Number of children: Not on file    Years of education: Not on file    Highest education level: Not on file   Occupational History    Not on file   Social Needs    Financial resource strain: Not on file    Food insecurity:     Worry: Not on file      Inability: Not on file    Transportation needs:     Medical: Not on file     Non-medical: Not on file   Tobacco Use    Smoking status: Former Smoker     Types: Cigarettes     Last attempt to quit: 8/3/1957     Years since quittin.4    Smokeless tobacco: Never Used    Tobacco comment: Sociall in her 30's   Substance and Sexual Activity    Alcohol use: No    Drug use: No    Sexual activity: Never   Lifestyle    Physical activity:     Days per week: Not on file     Minutes per session: Not on file    Stress: Not on file   Relationships    Social connections:     Talks on phone: Not on file     Gets together: Not on file     Attends Baptism service: Not on file     Active member of club or organization: Not on file     Attends meetings of clubs or organizations: Not on file     Relationship status: Not on file   Other Topics Concern    Not on file   Social History Narrative    Not on file

## 2020-02-03 ENCOUNTER — OFFICE VISIT (OUTPATIENT)
Dept: INTERNAL MEDICINE | Facility: CLINIC | Age: 85
End: 2020-02-03
Payer: MEDICARE

## 2020-02-03 VITALS
SYSTOLIC BLOOD PRESSURE: 138 MMHG | OXYGEN SATURATION: 97 % | WEIGHT: 141.75 LBS | HEIGHT: 56 IN | HEART RATE: 90 BPM | BODY MASS INDEX: 31.89 KG/M2 | DIASTOLIC BLOOD PRESSURE: 62 MMHG

## 2020-02-03 DIAGNOSIS — E53.8 B12 DEFICIENCY: ICD-10-CM

## 2020-02-03 DIAGNOSIS — Q99.2 FRAGILE X ASSOCIATED TREMOR ATAXIA SYNDROME: ICD-10-CM

## 2020-02-03 DIAGNOSIS — H91.93 BILATERAL HEARING LOSS, UNSPECIFIED HEARING LOSS TYPE: ICD-10-CM

## 2020-02-03 DIAGNOSIS — K21.9 GASTROESOPHAGEAL REFLUX DISEASE, ESOPHAGITIS PRESENCE NOT SPECIFIED: ICD-10-CM

## 2020-02-03 DIAGNOSIS — E78.5 HYPERLIPIDEMIA, UNSPECIFIED HYPERLIPIDEMIA TYPE: ICD-10-CM

## 2020-02-03 DIAGNOSIS — F41.9 ANXIETY: ICD-10-CM

## 2020-02-03 DIAGNOSIS — G89.29 CHRONIC NECK PAIN: ICD-10-CM

## 2020-02-03 DIAGNOSIS — M54.2 CHRONIC NECK PAIN: ICD-10-CM

## 2020-02-03 DIAGNOSIS — E11.49 TYPE II DIABETES MELLITUS WITH NEUROLOGICAL MANIFESTATIONS: ICD-10-CM

## 2020-02-03 DIAGNOSIS — I10 ESSENTIAL HYPERTENSION: Primary | ICD-10-CM

## 2020-02-03 DIAGNOSIS — G44.209 TENSION HEADACHE: ICD-10-CM

## 2020-02-03 DIAGNOSIS — G25.0 TREMOR, ESSENTIAL: ICD-10-CM

## 2020-02-03 DIAGNOSIS — G25.0 FRAGILE X ASSOCIATED TREMOR ATAXIA SYNDROME: ICD-10-CM

## 2020-02-03 DIAGNOSIS — I70.0 AORTIC ATHEROSCLEROSIS: ICD-10-CM

## 2020-02-03 DIAGNOSIS — E55.9 VITAMIN D DEFICIENCY: ICD-10-CM

## 2020-02-03 DIAGNOSIS — M81.0 OSTEOPOROSIS, UNSPECIFIED OSTEOPOROSIS TYPE, UNSPECIFIED PATHOLOGICAL FRACTURE PRESENCE: ICD-10-CM

## 2020-02-03 DIAGNOSIS — G11.9 FRAGILE X ASSOCIATED TREMOR ATAXIA SYNDROME: ICD-10-CM

## 2020-02-03 DIAGNOSIS — F03.91 DEMENTIA WITH BEHAVIORAL DISTURBANCE, UNSPECIFIED DEMENTIA TYPE: ICD-10-CM

## 2020-02-03 PROCEDURE — 1159F MED LIST DOCD IN RCRD: CPT | Mod: HCNC,S$GLB,, | Performed by: INTERNAL MEDICINE

## 2020-02-03 PROCEDURE — 99214 OFFICE O/P EST MOD 30 MIN: CPT | Mod: HCNC,S$GLB,, | Performed by: INTERNAL MEDICINE

## 2020-02-03 PROCEDURE — 99999 PR PBB SHADOW E&M-EST. PATIENT-LVL III: ICD-10-PCS | Mod: PBBFAC,HCNC,, | Performed by: INTERNAL MEDICINE

## 2020-02-03 PROCEDURE — 1126F AMNT PAIN NOTED NONE PRSNT: CPT | Mod: HCNC,S$GLB,, | Performed by: INTERNAL MEDICINE

## 2020-02-03 PROCEDURE — 99214 PR OFFICE/OUTPT VISIT, EST, LEVL IV, 30-39 MIN: ICD-10-PCS | Mod: HCNC,S$GLB,, | Performed by: INTERNAL MEDICINE

## 2020-02-03 PROCEDURE — 1101F PR PT FALLS ASSESS DOC 0-1 FALLS W/OUT INJ PAST YR: ICD-10-PCS | Mod: HCNC,CPTII,S$GLB, | Performed by: INTERNAL MEDICINE

## 2020-02-03 PROCEDURE — 99499 UNLISTED E&M SERVICE: CPT | Mod: HCNC,S$GLB,, | Performed by: INTERNAL MEDICINE

## 2020-02-03 PROCEDURE — 1126F PR PAIN SEVERITY QUANTIFIED, NO PAIN PRESENT: ICD-10-PCS | Mod: HCNC,S$GLB,, | Performed by: INTERNAL MEDICINE

## 2020-02-03 PROCEDURE — 99999 PR PBB SHADOW E&M-EST. PATIENT-LVL III: CPT | Mod: PBBFAC,HCNC,, | Performed by: INTERNAL MEDICINE

## 2020-02-03 PROCEDURE — 99499 RISK ADDL DX/OHS AUDIT: ICD-10-PCS | Mod: HCNC,S$GLB,, | Performed by: INTERNAL MEDICINE

## 2020-02-03 PROCEDURE — 1101F PT FALLS ASSESS-DOCD LE1/YR: CPT | Mod: HCNC,CPTII,S$GLB, | Performed by: INTERNAL MEDICINE

## 2020-02-03 PROCEDURE — 1159F PR MEDICATION LIST DOCUMENTED IN MEDICAL RECORD: ICD-10-PCS | Mod: HCNC,S$GLB,, | Performed by: INTERNAL MEDICINE

## 2020-02-03 RX ORDER — FAMOTIDINE 20 MG/1
20 TABLET, FILM COATED ORAL 2 TIMES DAILY
Qty: 180 TABLET | Refills: 3 | Status: SHIPPED | OUTPATIENT
Start: 2020-02-03 | End: 2020-03-17

## 2020-02-03 RX ORDER — SERTRALINE HYDROCHLORIDE 50 MG/1
50 TABLET, FILM COATED ORAL DAILY
Qty: 90 TABLET | Refills: 3 | Status: SHIPPED | OUTPATIENT
Start: 2020-02-03 | End: 2020-07-10

## 2020-02-03 NOTE — PROGRESS NOTES
Internal Medicine    Subjective:      Patient ID: Zeina White is a 89 y.o. female.    Chief Complaint: Follow-up    HPI:  Patient presents for follow up appointment.  The patient's last visit with me was on 7/15/2019.  She presents today accompanied by her daughter.    Presyncopal  episodes:  Patient has had these symptoms entire life.  Improved some since last appointment but not resolved.  Did not start with initiation of Seroquel.  No identifiable triggers.     Neck pain, headaches:  Has been diagnosed with cervical OA in the past.  Reports tenderness on posterior scalp. Takes Tylenol arthritis 650mg PRN, which is helpful.  Occasionally will take Ibuprofen 200mg which is also helpful.  Daughter says patient leans forward when watching TV which may strain her neck.  Anxiety has also been worse recently so could be due to tension.        Dementia with behavioral disturbance, depression:  Patient has 24/7 sitters.  Followed by Neurology (Dr. Churchill) - last seen 12/20/17.  Taking Zoloft 25mg daily and and Seroquel 50mg at 3pm + 62.5mg at 6pm.  Anxiety has been worse recently per daughter.       HTN:  Taking Norvasc 10mg daily and lisinopril 40mg daily.  BP occasionally still in 140's.  Tolerating slightly higher blood pressure due to age and presyncope.       HLD:  Taking simvistatin 40mg daily.  Denies side effects.     DM2:  Taking metformin 500mg BID.  Last HgA1c 7.8 on 5/13/19.  Daughter reports FSG have been a little higher - usually 130's-160's.       Bilateral knee OA (R>L):  Saw MAGDALENA Howell on 4/13/18.  Given steroid injection.  Unsure if it was helpful.  Voltaren gel is very helpful.       IBS, predominantly constipation:  Now using Colace as needed and avoiding frequent use of laxatives.  Has fecal urgency when she leaves the house, presumably from stress/anxiety.  Has diffuse abdominal cramping that responds well to Gas-X.     GERD:  Takes Zantac 150mg BID.        Osteoporosis:  Last DEXA in 2009  "showing osteoporosis of hip, recommendation was repeat in 2 years.  Family has declined repeat DEXA, treatment for osteoporosis, and Endocrine referral.     Vit B12 deficiency:  Taking B12 500mcg daily.     Macular degeneration:  Sees Ophthalmology.  Taking Preservision.       Seeing audiologist for hearing aids.     Fragile X Syndrome.        Review of Systems   Constitutional: Negative for appetite change, chills, fatigue, fever and unexpected weight change.   HENT: Positive for hearing loss. Negative for sore throat and trouble swallowing.    Eyes: Negative for visual disturbance.   Respiratory: Negative for cough, chest tightness, shortness of breath and wheezing.    Cardiovascular: Negative for chest pain, palpitations and leg swelling.   Gastrointestinal: Negative for abdominal pain, blood in stool, constipation, diarrhea, nausea and vomiting.   Genitourinary: Negative for difficulty urinating.   Musculoskeletal: Positive for neck pain. Negative for arthralgias and myalgias.   Skin: Negative for rash and wound.   Neurological: Positive for headaches. Negative for dizziness, weakness and numbness.   Psychiatric/Behavioral: Negative for behavioral problems and confusion. The patient is nervous/anxious.        Past medical history, surgical history, and family medical history reviewed and updated as appropriate.    Medications and allergies reviewed.     Objective:     Vitals:    02/03/20 1142   BP: 138/62   BP Location: Right arm   Patient Position: Sitting   BP Method: Medium (Manual)   Pulse: 90   SpO2: 97%   Weight: 64.3 kg (141 lb 12.1 oz)   Height: 4' 8" (1.422 m)     Physical Exam   Constitutional: She is oriented to person, place, and time. She appears well-developed and well-nourished. No distress.   Sitting in wheelchair, elderly female   HENT:   Head: Normocephalic and atraumatic.   Mildly tender over posterior scalp   Eyes: Pupils are equal, round, and reactive to light. Conjunctivae and EOM are " normal. No scleral icterus.   Cardiovascular: Normal rate, regular rhythm and normal heart sounds. Exam reveals no gallop and no friction rub.   No murmur heard.  Pulmonary/Chest: Effort normal and breath sounds normal. No respiratory distress. She has no wheezes. She has no rales.   Abdominal: Soft. She exhibits no distension and no mass. There is tenderness (Very mild, diffusely). There is no rebound and no guarding.   Musculoskeletal: She exhibits no edema or tenderness.   Neurological: She is alert and oriented to person, place, and time.   Chronic difficulty hearing   Skin: No rash noted. No erythema.   Psychiatric: She has a normal mood and affect. Her behavior is normal.       RESULTS:   Lab Results   Component Value Date    WBC 7.52 05/14/2019    HGB 11.6 (L) 05/14/2019    HCT 35.6 (L) 05/14/2019    MCV 90 05/14/2019     05/14/2019     BMP  Lab Results   Component Value Date     05/14/2019    K 3.4 (L) 05/14/2019     05/14/2019    CO2 28 05/14/2019    BUN 14 05/14/2019    CREATININE 0.7 05/14/2019    CALCIUM 8.9 05/14/2019    ANIONGAP 9 05/14/2019    ESTGFRAFRICA >60.0 05/14/2019    EGFRNONAA >60.0 05/14/2019     Lab Results   Component Value Date    ALT 10 05/14/2019    AST 13 05/14/2019    ALKPHOS 61 05/14/2019    BILITOT 0.3 05/14/2019     Lab Results   Component Value Date    TSH 1.996 05/13/2019     Lab Results   Component Value Date    HGBA1C 7.8 (H) 05/13/2019         Assessment:     1. Essential hypertension    2. Hyperlipidemia, unspecified hyperlipidemia type    3. Type II diabetes mellitus with neurological manifestations    4. Dementia with behavioral disturbance, unspecified dementia type    5. Anxiety    6. Chronic neck pain    7. Tension headache    8. Tremor, essential    9. B12 deficiency    10. Gastroesophageal reflux disease, esophagitis presence not specified    11. Osteoporosis, unspecified osteoporosis type, unspecified pathological fracture presence    12. Vitamin D  deficiency    13. 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 Aortic atherosclerosis        Plan:     *Continue medication/plan as discussed in HPI except for changes discussed below.    Zeina was seen today for follow-up.    Diagnoses and all orders for this visit:    Essential hypertension  -     Comprehensive metabolic panel; Future; Expected date: 02/03/2020  -     TSH; Future; Expected date: 02/03/2020    Hyperlipidemia, unspecified hyperlipidemia type  -     Comprehensive metabolic panel; Future; Expected date: 02/03/2020  -     LDL Cholesterol, Direct Measurement; Future; Expected date: 02/03/2020    Type II diabetes mellitus with neurological manifestations  -     Comprehensive metabolic panel; Future; Expected date: 02/03/2020  -     Hemoglobin A1c; Future; Expected date: 02/03/2020    Dementia with behavioral disturbance, unspecified dementia type  Anxiety   Increase Zoloft to 100mg daily.  -     sertraline (ZOLOFT) 50 MG tablet; Take 1 tablet (50 mg total) by mouth once daily.    Chronic neck pain  Tension headache   Continue Tylenol PRN.    Tremor, essential    B12 deficiency  -     CBC auto differential; Future; Expected date: 02/03/2020  -     Vitamin B12; Future; Expected date: 02/03/2020    Gastroesophageal reflux disease, esophagitis presence not specified   Change Zantac to Pepcid.    -     famotidine (PEPCID) 20 MG tablet; Take 1 tablet (20 mg total) by mouth 2 (two) times daily.    Osteoporosis, unspecified osteoporosis type, unspecified pathological fracture presence    Vitamin D deficiency  -     Vitamin D; Future; Expected date: 02/03/2020    Bilateral hearing loss, unspecified hearing loss type    Fragile X associated tremor ataxia syndrome    Aortic atherosclerosis    Health maintenance reviewed with patient.     Follow up in about 6 months (around 8/3/2020).    Shahnaz Gupta MD  Internal Medicine  [facility]

## 2020-02-06 ENCOUNTER — PATIENT MESSAGE (OUTPATIENT)
Dept: PSYCHIATRY | Facility: CLINIC | Age: 85
End: 2020-02-06

## 2020-02-06 DIAGNOSIS — E11.49 TYPE II DIABETES MELLITUS WITH NEUROLOGICAL MANIFESTATIONS: Primary | ICD-10-CM

## 2020-02-06 RX ORDER — METFORMIN HYDROCHLORIDE 750 MG/1
750 TABLET, EXTENDED RELEASE ORAL
Qty: 180 TABLET | Refills: 3 | Status: SHIPPED | OUTPATIENT
Start: 2020-02-06 | End: 2020-07-09

## 2020-02-12 DIAGNOSIS — F03.91 DEMENTIA WITH BEHAVIORAL DISTURBANCE, UNSPECIFIED DEMENTIA TYPE: ICD-10-CM

## 2020-02-12 RX ORDER — QUETIAPINE FUMARATE 25 MG/1
TABLET, FILM COATED ORAL
Qty: 405 TABLET | Refills: 3 | Status: SHIPPED | OUTPATIENT
Start: 2020-02-12

## 2020-02-14 ENCOUNTER — PATIENT MESSAGE (OUTPATIENT)
Dept: INTERNAL MEDICINE | Facility: CLINIC | Age: 85
End: 2020-02-14

## 2020-02-20 DIAGNOSIS — F03.91 DEMENTIA WITH BEHAVIORAL DISTURBANCE, UNSPECIFIED DEMENTIA TYPE: ICD-10-CM

## 2020-02-20 RX ORDER — SERTRALINE HYDROCHLORIDE 25 MG/1
TABLET, FILM COATED ORAL
Qty: 90 TABLET | Refills: 3 | OUTPATIENT
Start: 2020-02-20

## 2020-02-26 ENCOUNTER — PATIENT MESSAGE (OUTPATIENT)
Dept: INTERNAL MEDICINE | Facility: CLINIC | Age: 85
End: 2020-02-26

## 2020-03-16 ENCOUNTER — PATIENT MESSAGE (OUTPATIENT)
Dept: INTERNAL MEDICINE | Facility: CLINIC | Age: 85
End: 2020-03-16

## 2020-03-16 DIAGNOSIS — K21.9 GASTROESOPHAGEAL REFLUX DISEASE WITHOUT ESOPHAGITIS: Primary | ICD-10-CM

## 2020-03-17 RX ORDER — PANTOPRAZOLE SODIUM 20 MG/1
20 TABLET, DELAYED RELEASE ORAL
Qty: 180 TABLET | Refills: 3 | Status: SHIPPED | OUTPATIENT
Start: 2020-03-17 | End: 2021-03-17

## 2020-05-23 ENCOUNTER — PATIENT MESSAGE (OUTPATIENT)
Dept: INTERNAL MEDICINE | Facility: CLINIC | Age: 85
End: 2020-05-23

## 2020-07-08 ENCOUNTER — PATIENT OUTREACH (OUTPATIENT)
Dept: ADMINISTRATIVE | Facility: OTHER | Age: 85
End: 2020-07-08

## 2020-07-09 ENCOUNTER — OFFICE VISIT (OUTPATIENT)
Dept: OBSTETRICS AND GYNECOLOGY | Facility: CLINIC | Age: 85
End: 2020-07-09
Attending: OBSTETRICS & GYNECOLOGY
Payer: MEDICARE

## 2020-07-09 VITALS
SYSTOLIC BLOOD PRESSURE: 130 MMHG | DIASTOLIC BLOOD PRESSURE: 60 MMHG | BODY MASS INDEX: 31.34 KG/M2 | WEIGHT: 139.31 LBS | HEIGHT: 56 IN

## 2020-07-09 DIAGNOSIS — N76.2 ACUTE VULVITIS: Primary | ICD-10-CM

## 2020-07-09 PROCEDURE — 99203 PR OFFICE/OUTPT VISIT, NEW, LEVL III, 30-44 MIN: ICD-10-PCS | Mod: HCNC,S$GLB,, | Performed by: OBSTETRICS & GYNECOLOGY

## 2020-07-09 PROCEDURE — 99999 PR PBB SHADOW E&M-EST. PATIENT-LVL III: ICD-10-PCS | Mod: PBBFAC,HCNC,, | Performed by: OBSTETRICS & GYNECOLOGY

## 2020-07-09 PROCEDURE — 1101F PT FALLS ASSESS-DOCD LE1/YR: CPT | Mod: HCNC,CPTII,S$GLB, | Performed by: OBSTETRICS & GYNECOLOGY

## 2020-07-09 PROCEDURE — 1159F MED LIST DOCD IN RCRD: CPT | Mod: HCNC,S$GLB,, | Performed by: OBSTETRICS & GYNECOLOGY

## 2020-07-09 PROCEDURE — 1126F AMNT PAIN NOTED NONE PRSNT: CPT | Mod: HCNC,S$GLB,, | Performed by: OBSTETRICS & GYNECOLOGY

## 2020-07-09 PROCEDURE — 1159F PR MEDICATION LIST DOCUMENTED IN MEDICAL RECORD: ICD-10-PCS | Mod: HCNC,S$GLB,, | Performed by: OBSTETRICS & GYNECOLOGY

## 2020-07-09 PROCEDURE — 1101F PR PT FALLS ASSESS DOC 0-1 FALLS W/OUT INJ PAST YR: ICD-10-PCS | Mod: HCNC,CPTII,S$GLB, | Performed by: OBSTETRICS & GYNECOLOGY

## 2020-07-09 PROCEDURE — 99999 PR PBB SHADOW E&M-EST. PATIENT-LVL III: CPT | Mod: PBBFAC,HCNC,, | Performed by: OBSTETRICS & GYNECOLOGY

## 2020-07-09 PROCEDURE — 87480 CANDIDA DNA DIR PROBE: CPT | Mod: HCNC

## 2020-07-09 PROCEDURE — 87510 GARDNER VAG DNA DIR PROBE: CPT | Mod: HCNC

## 2020-07-09 PROCEDURE — 99203 OFFICE O/P NEW LOW 30 MIN: CPT | Mod: HCNC,S$GLB,, | Performed by: OBSTETRICS & GYNECOLOGY

## 2020-07-09 PROCEDURE — 1126F PR PAIN SEVERITY QUANTIFIED, NO PAIN PRESENT: ICD-10-PCS | Mod: HCNC,S$GLB,, | Performed by: OBSTETRICS & GYNECOLOGY

## 2020-07-09 RX ORDER — METFORMIN HYDROCHLORIDE 500 MG/1
TABLET ORAL
COMMUNITY
Start: 2020-06-22 | End: 2020-07-10 | Stop reason: SDUPTHER

## 2020-07-09 RX ORDER — NYSTATIN 100000 U/G
CREAM TOPICAL 2 TIMES DAILY
Qty: 30 G | Refills: 2 | Status: SHIPPED | OUTPATIENT
Start: 2020-07-09 | End: 2020-08-08

## 2020-07-10 ENCOUNTER — PATIENT MESSAGE (OUTPATIENT)
Dept: INTERNAL MEDICINE | Facility: CLINIC | Age: 85
End: 2020-07-10

## 2020-07-10 DIAGNOSIS — F41.9 ANXIETY: ICD-10-CM

## 2020-07-10 DIAGNOSIS — F03.91 DEMENTIA WITH BEHAVIORAL DISTURBANCE, UNSPECIFIED DEMENTIA TYPE: ICD-10-CM

## 2020-07-10 LAB
CANDIDA RRNA VAG QL PROBE: NEGATIVE
G VAGINALIS RRNA GENITAL QL PROBE: NEGATIVE
T VAGINALIS RRNA GENITAL QL PROBE: NEGATIVE

## 2020-07-10 RX ORDER — SERTRALINE HYDROCHLORIDE 25 MG/1
25 TABLET, FILM COATED ORAL DAILY
Qty: 30 TABLET | Refills: 3 | Status: SHIPPED | OUTPATIENT
Start: 2020-07-10 | End: 2020-10-01 | Stop reason: SDUPTHER

## 2020-07-10 RX ORDER — METFORMIN HYDROCHLORIDE 500 MG/1
500 TABLET ORAL 2 TIMES DAILY WITH MEALS
Qty: 60 TABLET | Refills: 3 | Status: SHIPPED | OUTPATIENT
Start: 2020-07-10 | End: 2020-09-21

## 2020-07-16 NOTE — PROGRESS NOTES
SUBJECTIVE:   89 y.o. female   for for vulvar burning. No LMP recorded. Patient has had a hysterectomy..  Patient is hearing impaired, and accompanied by her daughter Destiny.  Her daughter helps care for her.  She has IBS and sometimes has difficulty with bathroom issues.  Reports some vulvar itching and burning for the last week.  Has tried oral azo without relief.  Reports Monistat caused burning.  Denies ulcers or lesions.  Not itching.         Past Medical History:   Diagnosis Date    Anxiety     Atypical chest pain     Dementia     Diabetes mellitus     DJD (degenerative joint disease)     GERD (gastroesophageal reflux disease)     Hyperlipidemia     Hypertension     Irritable bowel     Tremor, essential     Vertigo      Past Surgical History:   Procedure Laterality Date    HYSTERECTOMY      JOINT REPLACEMENT      L total knee    KNEE SURGERY      bilat    QUADRICEPS REPAIR      R     Social History     Socioeconomic History    Marital status:      Spouse name: Not on file    Number of children: Not on file    Years of education: Not on file    Highest education level: Not on file   Occupational History    Not on file   Social Needs    Financial resource strain: Not on file    Food insecurity     Worry: Not on file     Inability: Not on file    Transportation needs     Medical: Not on file     Non-medical: Not on file   Tobacco Use    Smoking status: Former Smoker     Types: Cigarettes     Quit date: 8/3/1957     Years since quittin.9    Smokeless tobacco: Never Used    Tobacco comment: Sociall in her 30's   Substance and Sexual Activity    Alcohol use: No    Drug use: No    Sexual activity: Never   Lifestyle    Physical activity     Days per week: Not on file     Minutes per session: Not on file    Stress: Not on file   Relationships    Social connections     Talks on phone: Not on file     Gets together: Not on file     Attends Taoism service: Not on file      Active member of club or organization: Not on file     Attends meetings of clubs or organizations: Not on file     Relationship status: Not on file   Other Topics Concern    Not on file   Social History Narrative    Not on file     Family History   Problem Relation Age of Onset    Cancer Mother         kidney    Diabetes Father     Parkinsonism Father     Alcohol abuse Brother     Breast cancer Neg Hx     Colon cancer Neg Hx     Ovarian cancer Neg Hx      OB History    Para Term  AB Living   7 7           SAB TAB Ectopic Multiple Live Births                  # Outcome Date GA Lbr Jose/2nd Weight Sex Delivery Anes PTL Lv   7 Para            6 Para            5 Para            4 Para            3 Para            2 Para            1 Para                  Current Outpatient Medications   Medication Sig Dispense Refill    acetaminophen (TYLENOL) 500 MG tablet Take 500 mg by mouth 3 (three) times daily as needed for Pain.      amLODIPine (NORVASC) 5 MG tablet TAKE 1 TABLET(5 MG) BY MOUTH TWICE DAILY 180 tablet 3    b complex vitamins tablet Take 1 tablet by mouth once daily.      blood sugar diagnostic Strp 1 strip by Misc.(Non-Drug; Combo Route) route 2 (two) times daily. May have a 30 or 90 day supply 100 each 11    CMPD triamcinolone 0.1% (15 gm)- mycostatin powder (2 million units)- calamine lotion (QS) Apply 120 mLs topically 3 (three) times daily. Apply externally 3 times daily. 120 mL 3    diclofenac sodium (VOLTAREN) 1 % Gel Apply 2 g topically once daily. 100 g 11    docusate sodium (COLACE) 100 MG capsule Take 100 mg by mouth once daily.      lidocaine (LIDODERM) 5 % Place 1 patch onto the skin once daily. Remove & Discard patch within 12 hours or as directed by MD (Patient not taking: Reported on 2/3/2020) 28 patch 0    lisinopril (PRINIVIL,ZESTRIL) 40 MG tablet TAKE 1 TABLET(40 MG) BY MOUTH EVERY DAY 90 tablet 3    meclizine (ANTIVERT) 12.5 mg tablet Take 1 tablet (12.5 mg  total) by mouth 3 (three) times daily as needed for Dizziness. 20 tablet 0    metFORMIN (GLUCOPHAGE) 500 MG tablet Take 1 tablet (500 mg total) by mouth 2 (two) times daily with meals. 60 tablet 3    multivitamin capsule Take 1 capsule by mouth once daily.      nystatin (MYCOSTATIN) cream Apply topically 2 (two) times daily. 30 g 2    nystatin (MYCOSTATIN) ointment Apply topically 2 (two) times daily. 30 g 0    pantoprazole (PROTONIX) 20 MG tablet Take 1 tablet (20 mg total) by mouth 2 (two) times daily before meals. 180 tablet 3    QUEtiapine (SEROQUEL) 25 MG Tab TAKE 2 TABLETS BY MOUTH EVERY DAY AT 3PM AND 2 1/2 TABLETS BY MOUTH EVERY EVENING 405 tablet 3    sertraline (ZOLOFT) 25 MG tablet Take 1 tablet (25 mg total) by mouth once daily. 30 tablet 3    simvastatin (ZOCOR) 40 MG tablet TAKE 1 TABLET BY MOUTH EVERY DAY 90 tablet 3    TRUE METRIX GLUCOSE METER Misc Use as directed 1 each 0    TRUEPLUS LANCETS 33 gauge Misc        No current facility-administered medications for this visit.      Allergies: Glimepiride and Naproxen     ROS:  Constitutional: no weight loss, weight gain, fever, fatigue  Eyes:  No vision changes, glasses/contacts  ENT/Mouth: No ulcers, sinus problems, ears ringing, headache  Cardiovascular: No inability to lie flat, chest pain, exercise intolerance, swelling, heart palpitations  Respiratory: No wheezing, coughing blood, shortness of breath, or cough  Gastrointestinal: No diarrhea, bloody stool, nausea/vomiting, constipation, gas, hemorrhoids  Genitourinary: No blood in urine, painful urination, urgency of urination, frequency of urination, incomplete emptying, incontinence, abnormal bleeding, painful periods, heavy periods, vaginal discharge, vaginal odor, painful intercourse, sexual problems, bleeding after intercourse.  Musculoskeletal: No muscle weakness  Skin/Breast: No painful breasts, nipple discharge, masses, rash, ulcers  Neurological: No passing out, seizures,  "numbness, headache  Endocrine: No diabetes, hypothyroid, hyperthyroid, hot flashes, hair loss, abnormal hair growth, ance  Psychiatric: No depression, crying  Hematologic: No bruises, bleeding, swollen lymph nodes, anemia.      OBJECTIVE:   The patient appears well, alert, oriented x 3, in no distress.  /60   Ht 4' 8" (1.422 m)   Wt 63.2 kg (139 lb 5.3 oz)   BMI 31.24 kg/m²   GENITALIA: normal external genitalia, mild erythema, no discharge    ASSESSMENT:   1. Acute vulvitis  Vaginosis Screen by DNA Probe       PLAN:   Orders Placed This Encounter    Vaginosis Screen by DNA Probe    Vaginosis Screen by DNA Probe    nystatin (MYCOSTATIN) cream     Discussed acute vulvitis and tx option.  Trial of nystatin.  OTC diaper rash cream ie: BBP.  Aquafor or A&D ointment may also be a good barrier.  Return to clinic prn    "

## 2020-07-31 ENCOUNTER — PATIENT MESSAGE (OUTPATIENT)
Dept: INTERNAL MEDICINE | Facility: CLINIC | Age: 85
End: 2020-07-31

## 2020-07-31 DIAGNOSIS — I10 ESSENTIAL HYPERTENSION: ICD-10-CM

## 2020-07-31 DIAGNOSIS — F03.91 DEMENTIA WITH BEHAVIORAL DISTURBANCE, UNSPECIFIED DEMENTIA TYPE: Primary | ICD-10-CM

## 2020-07-31 DIAGNOSIS — Q99.2 FRAGILE X SYNDROME: ICD-10-CM

## 2020-07-31 DIAGNOSIS — E78.5 HYPERLIPIDEMIA, UNSPECIFIED HYPERLIPIDEMIA TYPE: Primary | ICD-10-CM

## 2020-08-02 RX ORDER — AMLODIPINE BESYLATE 5 MG/1
TABLET ORAL
Qty: 180 TABLET | Refills: 1 | Status: SHIPPED | OUTPATIENT
Start: 2020-08-02

## 2020-08-02 RX ORDER — LISINOPRIL 40 MG/1
TABLET ORAL
Qty: 90 TABLET | Refills: 1 | Status: SHIPPED | OUTPATIENT
Start: 2020-08-02 | End: 2020-09-15

## 2020-08-02 RX ORDER — SIMVASTATIN 40 MG/1
40 TABLET, FILM COATED ORAL DAILY
Qty: 90 TABLET | Refills: 1 | Status: SHIPPED | OUTPATIENT
Start: 2020-08-02 | End: 2020-08-12

## 2020-08-24 ENCOUNTER — PATIENT MESSAGE (OUTPATIENT)
Dept: OBSTETRICS AND GYNECOLOGY | Facility: CLINIC | Age: 85
End: 2020-08-24

## 2020-09-19 ENCOUNTER — OFFICE VISIT (OUTPATIENT)
Dept: URGENT CARE | Facility: CLINIC | Age: 85
End: 2020-09-19
Payer: MEDICARE

## 2020-09-19 ENCOUNTER — TELEPHONE (OUTPATIENT)
Dept: INTERNAL MEDICINE | Facility: CLINIC | Age: 85
End: 2020-09-19

## 2020-09-19 VITALS
OXYGEN SATURATION: 95 % | HEART RATE: 88 BPM | DIASTOLIC BLOOD PRESSURE: 75 MMHG | SYSTOLIC BLOOD PRESSURE: 136 MMHG | TEMPERATURE: 98 F

## 2020-09-19 DIAGNOSIS — S90.31XA CONTUSION OF RIGHT FOOT, INITIAL ENCOUNTER: Primary | ICD-10-CM

## 2020-09-19 PROCEDURE — 99214 OFFICE O/P EST MOD 30 MIN: CPT | Mod: S$GLB,,, | Performed by: FAMILY MEDICINE

## 2020-09-19 PROCEDURE — 73630 X-RAY EXAM OF FOOT: CPT | Mod: RT,S$GLB,, | Performed by: RADIOLOGY

## 2020-09-19 PROCEDURE — 99214 PR OFFICE/OUTPT VISIT, EST, LEVL IV, 30-39 MIN: ICD-10-PCS | Mod: S$GLB,,, | Performed by: FAMILY MEDICINE

## 2020-09-19 PROCEDURE — 73630 XR FOOT COMPLETE 3 VIEW RIGHT: ICD-10-PCS | Mod: RT,S$GLB,, | Performed by: RADIOLOGY

## 2020-09-19 NOTE — TELEPHONE ENCOUNTER
----- Message from Mackenzie Rice sent at 9/19/2020  1:17 PM CDT -----  Type:  Patient Returning Call    Who Called: pt daughter   Who Left Message for Patient: pt daughter   Does the patient know what this is regarding?: pt daughter need call back  about her mom fell  she want and  xr   Would the patient rather a call back or a response via [facility]?  Call   Best Call Back Number: call back [phone]

## 2020-09-19 NOTE — PROGRESS NOTES
Subjective:       Patient ID: Zeina White is a 89 y.o. female.    Vitals:  temperature is 98.4 °F (36.9 °C). Her blood pressure is 136/75 and her pulse is 88. Her oxygen saturation is 95%.     Chief Complaint: Leg Injury    Patient presents with leg injury and swelling after a fall yesterday. Patient did hit her head but did not black out. Reports she was transferring with the help of sitter and lost her  and landed on floor hitting her head against a sink. No loss of consciousness - acting normally and appropriately daughter. Complaining of persistent foot pain but otherwise no other concerning symptoms    Leg Pain   The incident occurred 12 to 24 hours ago. The incident occurred at home. There was no injury mechanism. The pain is present in the right leg. The quality of the pain is described as aching. The pain is mild. The pain has been fluctuating since onset. She reports no foreign bodies present. Nothing aggravates the symptoms. She has tried nothing for the symptoms. The treatment provided mild relief.       Constitution: Negative for fatigue.   HENT: Negative for facial swelling and facial trauma.    Neck: Negative for neck stiffness.   Cardiovascular: Negative for chest trauma.   Eyes: Negative for eye trauma, double vision and blurred vision.   Gastrointestinal: Negative for abdominal trauma, abdominal pain and rectal bleeding.   Genitourinary: Negative for hematuria, missed menses, genital trauma and pelvic pain.   Musculoskeletal: Positive for pain, trauma, joint swelling and abnormal ROM of joint.   Skin: Positive for color change. Negative for wound, abrasion, laceration and bruising.   Neurological: Negative for dizziness, history of vertigo, light-headedness, coordination disturbances, altered mental status and loss of consciousness.   Hematologic/Lymphatic: Negative for history of bleeding disorder.   Psychiatric/Behavioral: Negative for altered mental status.       Objective:      Physical  Exam   HENT:   Head: Normocephalic.   Neck: Normal range of motion. Neck supple.   Cardiovascular: Normal rate, regular rhythm, normal heart sounds and normal pulses.   Pulmonary/Chest: Effort normal and breath sounds normal.   Abdominal: Normal appearance.   Neurological: She is alert.   Skin: Skin is warm. Lesions:  bruising (left temple region and dorsum right foot noted)  Nursing note and vitals reviewed.        Assessment:       1. Contusion of right foot, initial encounter        Plan:         Contusion of right foot, initial encounter  -     XR FOOT COMPLETE 3 VIEW RIGHT; Future; Expected date: 09/19/2020    tylenol and ice. RTC prn worsening symptoms

## 2020-09-19 NOTE — PATIENT INSTRUCTIONS
Foot Contusion  You have a contusion. This is also called a bruise. There is swelling and some bleeding under the skin, but no broken bones. This injury generally takes a few days to a few weeks to heal.  During that time, the bruise will typically change in color from reddish, to purple-blue, to greenish-yellow, then to yellow-brown.  Home care  · Elevate the foot to reduce pain and swelling. As much as possible, sit or lie down with the foot raised about the level of your heart. This is especially important during the first 48 hours.  · Ice the foot to help reduce pain and swelling. Wrap a cold source (ice pack or ice cubes in a plastic bag) in a thin towel. Apply to the bruised area for 20 minutes every 1 to 2 hours the first day. Continue this 3 to 4 times a day until the pain and swelling goes away.  · Unless another medicine was prescribed, you can take acetaminophen, ibuprofen, or naproxen to control pain. (If you have chronic liver or kidney disease or ever had a stomach ulcer or gastrointestinal bleeding, talk with your healthcare provider before using these medicines.)  Follow up  Follow up with your healthcare provider or our staff as advised. Call if you are not improving within 1 to 2 weeks.  When to seek medical advice   Call your healthcare provider right away if you have any of the following:  · Increased pain or swelling  · Foot or leg becomes cold, blue, numb or tingly  · Signs of infection: Warmth, drainage, or increased redness or pain around the bruise  · Inability to move the injured foot   · Frequent bruising for unknown reasons  Date Last Reviewed: 2/1/2017  © 9591-5790 [employer]. [address] [place] [address], [place] [address]. All rights reserved. This information is not intended as a substitute for professional medical care. Always follow your healthcare professional's instructions.

## 2020-10-01 ENCOUNTER — OFFICE VISIT (OUTPATIENT)
Dept: PRIMARY CARE CLINIC | Facility: CLINIC | Age: 85
End: 2020-10-01
Payer: MEDICARE

## 2020-10-01 DIAGNOSIS — E78.2 MIXED HYPERLIPIDEMIA: ICD-10-CM

## 2020-10-01 DIAGNOSIS — G11.9 FRAGILE X ASSOCIATED TREMOR ATAXIA SYNDROME: ICD-10-CM

## 2020-10-01 DIAGNOSIS — I70.0 AORTIC ATHEROSCLEROSIS: ICD-10-CM

## 2020-10-01 DIAGNOSIS — Q99.2 FRAGILE X ASSOCIATED TREMOR ATAXIA SYNDROME: ICD-10-CM

## 2020-10-01 DIAGNOSIS — J47.9 BRONCHIECTASIS WITHOUT COMPLICATION: ICD-10-CM

## 2020-10-01 DIAGNOSIS — F03.91 DEMENTIA WITH BEHAVIORAL DISTURBANCE, UNSPECIFIED DEMENTIA TYPE: ICD-10-CM

## 2020-10-01 DIAGNOSIS — I70.1 RENAL ARTERY STENOSIS: ICD-10-CM

## 2020-10-01 DIAGNOSIS — Z87.898 HISTORY OF SYNCOPE: ICD-10-CM

## 2020-10-01 DIAGNOSIS — E11.49 TYPE II DIABETES MELLITUS WITH NEUROLOGICAL MANIFESTATIONS: ICD-10-CM

## 2020-10-01 DIAGNOSIS — Q99.2 FRAGILE X SYNDROME: ICD-10-CM

## 2020-10-01 DIAGNOSIS — I10 ESSENTIAL HYPERTENSION: ICD-10-CM

## 2020-10-01 DIAGNOSIS — K58.2 IRRITABLE BOWEL SYNDROME WITH BOTH CONSTIPATION AND DIARRHEA: ICD-10-CM

## 2020-10-01 DIAGNOSIS — Z00.00 HEALTHCARE MAINTENANCE: ICD-10-CM

## 2020-10-01 DIAGNOSIS — H35.30 MACULAR DEGENERATION, UNSPECIFIED LATERALITY, UNSPECIFIED TYPE: ICD-10-CM

## 2020-10-01 DIAGNOSIS — G25.0 FRAGILE X ASSOCIATED TREMOR ATAXIA SYNDROME: ICD-10-CM

## 2020-10-01 DIAGNOSIS — F33.0 MILD EPISODE OF RECURRENT MAJOR DEPRESSIVE DISORDER: ICD-10-CM

## 2020-10-01 PROBLEM — S72.002A CLOSED LEFT HIP FRACTURE: Status: RESOLVED | Noted: 2017-03-21 | Resolved: 2020-10-01

## 2020-10-01 PROBLEM — S72.042A: Status: RESOLVED | Noted: 2017-03-21 | Resolved: 2020-10-01

## 2020-10-01 PROCEDURE — 1159F PR MEDICATION LIST DOCUMENTED IN MEDICAL RECORD: ICD-10-PCS | Mod: 95,,, | Performed by: INTERNAL MEDICINE

## 2020-10-01 PROCEDURE — 1101F PT FALLS ASSESS-DOCD LE1/YR: CPT | Mod: CPTII,95,, | Performed by: INTERNAL MEDICINE

## 2020-10-01 PROCEDURE — 3052F HG A1C>EQUAL 8.0%<EQUAL 9.0%: CPT | Mod: CPTII,95,, | Performed by: INTERNAL MEDICINE

## 2020-10-01 PROCEDURE — 99499 RISK ADDL DX/OHS AUDIT: ICD-10-PCS | Mod: 95,,, | Performed by: INTERNAL MEDICINE

## 2020-10-01 PROCEDURE — 3052F PR MOST RECENT HEMOGLOBIN A1C LEVEL 8.0 - < 9.0%: ICD-10-PCS | Mod: CPTII,95,, | Performed by: INTERNAL MEDICINE

## 2020-10-01 PROCEDURE — 1101F PR PT FALLS ASSESS DOC 0-1 FALLS W/OUT INJ PAST YR: ICD-10-PCS | Mod: CPTII,95,, | Performed by: INTERNAL MEDICINE

## 2020-10-01 PROCEDURE — 99355 PR PROLONGED SVC, OUPT, EA ADDTL 30 MIN: CPT | Mod: 95,,, | Performed by: INTERNAL MEDICINE

## 2020-10-01 PROCEDURE — 1159F MED LIST DOCD IN RCRD: CPT | Mod: 95,,, | Performed by: INTERNAL MEDICINE

## 2020-10-01 PROCEDURE — 99355 PR PROLONGED SVC, OUPT, EA ADDTL 30 MIN: ICD-10-PCS | Mod: 95,,, | Performed by: INTERNAL MEDICINE

## 2020-10-01 PROCEDURE — 99215 PR OFFICE/OUTPT VISIT, EST, LEVL V, 40-54 MIN: ICD-10-PCS | Mod: 95,,, | Performed by: INTERNAL MEDICINE

## 2020-10-01 PROCEDURE — 99354 PR PROLONGED SVC, OUPT, 1ST HR: CPT | Mod: 95,,, | Performed by: INTERNAL MEDICINE

## 2020-10-01 PROCEDURE — 99215 OFFICE O/P EST HI 40 MIN: CPT | Mod: 95,,, | Performed by: INTERNAL MEDICINE

## 2020-10-01 PROCEDURE — 99354 PR PROLONGED SVC, OUPT, 1ST HR: ICD-10-PCS | Mod: 95,,, | Performed by: INTERNAL MEDICINE

## 2020-10-01 PROCEDURE — 99499 UNLISTED E&M SERVICE: CPT | Mod: 95,,, | Performed by: INTERNAL MEDICINE

## 2020-10-01 RX ORDER — LISINOPRIL 40 MG/1
40 TABLET ORAL DAILY
Qty: 90 TABLET | Refills: 3 | Status: SHIPPED | OUTPATIENT
Start: 2020-10-01 | End: 2021-10-01

## 2020-10-01 RX ORDER — SERTRALINE HYDROCHLORIDE 25 MG/1
25 TABLET, FILM COATED ORAL DAILY
Qty: 90 TABLET | Refills: 3 | Status: SHIPPED | OUTPATIENT
Start: 2020-10-01 | End: 2021-10-01

## 2020-10-01 NOTE — LETTER
October 1, 2020      Shahnaz Gupta MD  [address] [facility] [address]           [facility]  [address]  [place] [address]  Phone: [phone]  Fax: [phone]          Patient: Zeina White   MR Number: [id number]   YOB: 1927   Date of Visit: 10/1/2020       Dear Dr. Shahnaz Gupta:    Thank you for referring Zeina White to me for evaluation. Attached you will find relevant portions of my assessment and plan of care.    If you have questions, please do not hesitate to call me. I look forward to following Zeina White along with you.    Sincerely,    Radha Winston MD    Enclosure  CC:  No Recipients    If you would like to receive this communication electronically, please contact [email or web address] or [phone] to request more information on [employer] access.    For providers and/or their staff who would like to refer a patient to [facility], please contact us through our one-stop-shop provider referral line, [facility], at [phone].    If you feel you have received this communication in error or would no longer like to receive these types of communications, please e-mail [email or web address]

## 2020-10-01 NOTE — PROGRESS NOTES
This note has been generated using voice-recognition software. There may be typographical errors that have been missed during proof-reading.  The patient location is: home  The chief complaint leading to consultation is: establish care    Visit type: audiovisual    Face to Face time with patient: 70  130 minutes of total time spent on the encounter, which includes face to face time and non-face to face time preparing to see the patient (eg, review of tests), Obtaining and/or reviewing separately obtained history, Documenting clinical information in the electronic or other health record, Independently interpreting results (not separately reported) and communicating results to the patient/family/caregiver, or Care coordination (not separately reported).         Each patient to whom he or she provides medical services by telemedicine is:  (1) informed of the relationship between the physician and patient and the respective role of any other health care provider with respect to management of the patient; and (2) notified that he or she may decline to receive medical services by telemedicine and may withdraw from such care at any time.    Notes:   Primary Care Provider Appointment    Subjective:      Patient ID: Zeina White is a 89 y.o. female here to establish care     Chief Complaint: No chief complaint on file.  HPI: This is a 89-year-old female followed by TIFFANY Gupta with an epic risk score of 19% with no admissions are ED visits within the past year.  Patient with dementia, essential tremor, hearing loss, hypertension, hyperlipidemia, diabetes with neuropathy, GERD, IBS, fragile X with associated ataxia.    09/19/2020 patient seen in urgent care after fall.  Patient noted to take contusion of right foot.     07/09/2020 patient seen by OBGYN for vulvar burning.  Patient given Monistat cream.    02/03/2020 patient seen by Dr. Gupta for PCP visit.    12/20/2019 patient seen by Ortho for right foot pain.  Patient  noted to have history of right hip fracture complicated by mental status changes and psychosis postop.  Patient given lidocaine patches.    09/19/2012 patient seen by colorectal surgery for fecal incontinence and no concerning findings noted.  Patient try anti diarrheal stool and bowel training regimen.  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  Of note patient had episode of severe psychosis after hip fracture repair.  01/11/2013 patient seen by ENT for sensorineural hearing loss.  05/28/2018 patient seen by podiatry for diabetic foot care.  07/25/2017 patient seen abide psychiatry for dementia with severe psychosis after hip fracture repair.  08/28/2014 patient seen by surgery for sebaceous cyst on back.        Pt presents with daughter Destiny.  Pt Homebound with IBS symptoms.  Patient has difficulty leaving the home for any reason and has episodes of diarrhea when upset.  She she even has difficulty going to her daughter's house.  Patient lives with her 2 sons and 1 daughter, all with developmental delay associated with fragile X.  They have sitters to assist.  Her daughter Destiny lives nearby and is available if needed.  Intol of metfomren ER with feeling of weakness.  Pt with alternating diarrhea and constipation.  Not currently taking fiber supplement.  Unsure how much metformen is contributing to her diarrhea.    Pt with lifelong syncope.  Has been associated with eating and standing in the past.    Briefly discussed advanced care planning and living will.  Patient does have a power-of-.  Discussed importance of having these documents completed while patient still has capacity.      Patient Active Problem List   Diagnosis    DJD (degenerative joint disease)    GERD (gastroesophageal reflux disease)    Irritable bowel    Tremor, essential    Hyperlipidemia    Essential hypertension    Type II diabetes mellitus with neurological  manifestations    Hearing loss    History of syncope    Injury of left rotator cuff    Dementia with behavioral disturbance    Osteoporosis    Fragile X syndrome    Bilateral lower extremity edema    Aortic atherosclerosis    B12 deficiency    Chronic neck pain    Fragile X associated tremor ataxia syndrome    Macular degeneration    Healthcare maintenance    Bronchiectasis without complication    Renal artery stenosis    Episode of recurrent major depressive disorder        Past Surgical History:   Procedure Laterality Date    HIP FRACTURE SURGERY Left 2017    HYSTERECTOMY      JOINT REPLACEMENT  pt in 70s    L total knee    QUADRICEPS REPAIR      R       Past Medical History:   Diagnosis Date    Anxiety     Atypical chest pain     Dementia     Diabetes mellitus     DJD (degenerative joint disease)     GERD (gastroesophageal reflux disease)     Hyperlipidemia     Hypertension     Irritable bowel     Tremor, essential     Vertigo        Social History     Socioeconomic History    Marital status:      Spouse name: Not on file    Number of children: 7    Years of education: Not on file    Highest education level: Not on file   Occupational History    Occupation: homemaker    Occupation:  at a [employer]   Social Needs    Financial resource strain: Somewhat hard    Food insecurity     Worry: Never true     Inability: Never true    Transportation needs     Medical: No     Non-medical: No   Tobacco Use    Smoking status: Former Smoker     Types: Cigarettes     Quit date: 8/3/1957     Years since quittin.2    Smokeless tobacco: Never Used    Tobacco comment: Ronald in her 30's   Substance and Sexual Activity    Alcohol use: Not Currently    Drug use: No    Sexual activity: Not Currently   Lifestyle    Physical activity     Days per week: 1 day     Minutes per session: 10 min    Stress: Rather much   Relationships    Social connections     Talks on phone:  Once a week     Gets together: Twice a week     Attends Latter day service: More than 4 times per year     Active member of club or organization: No     Attends meetings of clubs or organizations: Never     Relationship status:    Other Topics Concern    Not on file   Social History Narrative    Lives with daughter miri and sons dieter and praful.  Has sitters.  She was running a Saturday handicapped [employer] league prior to becoming ill.       Review of Systems    No flowsheet data found.     Checklist of Daily Activities:        Objective:   There were no vitals taken for this visit.    Physical Exam        Lab Results   Component Value Date    WBC 9.51 02/03/2020    HGB 13.5 02/03/2020    HCT 42.2 02/03/2020     02/03/2020    CHOL 157 11/07/2018    TRIG 126 11/07/2018    HDL 40 11/07/2018    ALT 14 02/03/2020    AST 18 02/03/2020     02/03/2020    K 3.9 02/03/2020     02/03/2020    CREATININE 0.7 02/03/2020    BUN 15 02/03/2020    CO2 29 02/03/2020    TSH 1.053 02/03/2020    INR 1.0 09/02/2015    HGBA1C 8.6 (H) 02/03/2020       Current Outpatient Medications on File Prior to Visit   Medication Sig Dispense Refill    acetaminophen (TYLENOL) 500 MG tablet Take 500 mg by mouth 3 (three) times daily as needed for Pain.      amLODIPine (NORVASC) 5 MG tablet TAKE 1 TABLET(5 MG) BY MOUTH TWICE DAILY 180 tablet 1    b complex vitamins tablet Take 1 tablet by mouth once daily.      blood sugar diagnostic Strp 1 strip by Misc.(Non-Drug; Combo Route) route 2 (two) times daily. May have a 30 or 90 day supply 100 each 11    CMPD triamcinolone 0.1% (15 gm)- mycostatin powder (2 million units)- calamine lotion (QS) Apply 120 mLs topically 3 (three) times daily. Apply externally 3 times daily. 120 mL 3    diclofenac sodium (VOLTAREN) 1 % Gel Apply 2 g topically once daily. 100 g 11    docusate sodium (COLACE) 100 MG capsule Take 100 mg by mouth once daily.      metFORMIN (GLUCOPHAGE) 500 MG  tablet TAKE 1 TABLET BY MOUTH TWICE DAILY WITH MEALS 180 tablet 3    multivitamin capsule Take 1 capsule by mouth once daily.      nystatin (MYCOSTATIN) ointment Apply topically 2 (two) times daily. 30 g 0    pantoprazole (PROTONIX) 20 MG tablet Take 1 tablet (20 mg total) by mouth 2 (two) times daily before meals. 180 tablet 3    QUEtiapine (SEROQUEL) 25 MG Tab TAKE 2 TABLETS BY MOUTH EVERY DAY AT 3PM AND 2 1/2 TABLETS BY MOUTH EVERY EVENING 405 tablet 3    simvastatin (ZOCOR) 40 MG tablet TAKE 1 TABLET BY MOUTH EVERY DAY 90 tablet 1    TRUE METRIX GLUCOSE METER Misc Use as directed 1 each 0    TRUEPLUS LANCETS 33 gauge Misc        No current facility-administered medications on file prior to visit.          Assessment:   89 y.o. female with multiple co-morbid illnesses here for continued follow up of medical problems.      Plan:     Problem List Items Addressed This Visit        Neuro    Dementia with behavioral disturbance     Patient on high-dose Seroquel associated with significant psychosis status post repair of hip fracture in 2017.  Followed by psychiatry and neurology in the past.  · Risk of medication discussed today.  Will continue for quality life.         Relevant Orders    Ambulatory referral/consult to [facility]       Psychiatric    Episode of recurrent major depressive disorder     Patient with psychotic episode status post hip repair with fracture 2017.  Patient on high-dose Seroquel.  Followed by psychiatry and neurology in the past.  · Continue Seroquel and Zoloft.  Will monitor for any side effects of high-risk medication.            Ophtho    Macular degeneration     Patient followed by outside ophthalmologist.  Patient already hearing impaired.  Developing macular degeneration.            Pulmonary    Bronchiectasis without complication     Noted on CT a 09/02/2015:  bronchiectasis in the right lower lobe   · Patient currently asymptomatic, will  "follow.            Cardiac/Vascular    Hyperlipidemia     On prior imaging, ulcerated plaque noted in aorta.  High-dose statin indicated.  · Will discussed switching high-dose statin in the future.         Essential hypertension     Last blood pressure in chart at goal.  Patient tolerating current medications.  · Will continue to monitor home blood pressure.         Relevant Medications    lisinopriL (PRINIVIL,ZESTRIL) 40 MG tablet    Aortic atherosclerosis     CTA Chest Abdomen Pelvis - 9/2/2015    "There is atherosclerotic change of the thoracic and abdominal aorta with ulcerated plaque noted in the infrarenal abdominal aorta."  High-dose statin indicated.  · If possible in the future, plan to change to another statin.         Renal artery stenosis     Noted on CTA 09/02/2015:   two left renal arteries with stenosis of the origin of one of the accessory arteries   · Normal creatinine noted.  Blood pressure when last checked at goal.  · Will continue to follow.            Endocrine    Type II diabetes mellitus with neurological manifestations     Poorly controlled diabetes noted.  Last hemoglobin A1c elevated.  Patient intolerant of extended release metformin.  Patient with chronic diarrhea  due to IBS D but possibly exacerbated by metformin.  · Add Metamucil to metformin with food b.i.d..  · glucose log to next visit.  · Continue aspirin, Ace, and statin.            GI    Irritable bowel     IBS with alternating constipation and diarrhea, diarrhea predominant.  Limits patient's ability to leave home.  May be exacerbated by metformin.  · Will add Metamucil to metformin b.i.d..            Genetic    Fragile X syndrome    Fragile X associated tremor ataxia syndrome     Patient followed by neurology in the past.  patient using wheelchair.  · Will continue to follow for decline, especially in light of dementia.            Other    History of syncope     Frequent lifelong loss of consciousness.  No workup noted in " chart.  No recent episodes.  When discussing with daughter, sounds like vasovagal.  · To call with any further events.         Healthcare maintenance     · Yearly lab done 2/2020  · ACP discussed today, will have home nurse practitioner with palliative care discuss further with family.               Health Maintenance       Date Due Completion Date    TETANUS VACCINE 04/25/1945 ---    Shingles Vaccine (1 of 2) 04/25/1977 ---    Foot Exam 07/01/2020 7/1/2019 (Done)    Override on 7/1/2019: Done (Sees outside podiatrist)    Influenza Vaccine (1) 08/01/2020 11/4/2019    Hemoglobin A1c 08/03/2020 2/3/2020    Eye Exam 12/09/2020 12/9/2019    Lipid Panel 02/03/2021 2/3/2020        Medications Reconciliation:   I have reconciled the patient's home medications with the patient/family. I have updated all changes.  Refer to After-Visit Medication List.      Medication List          Accurate as of October 1, 2020 11:59 PM. If you have any questions, ask your nurse or doctor.            CONTINUE taking these medications    acetaminophen 500 MG tablet  Commonly known as: TYLENOL     amLODIPine 5 MG tablet  Commonly known as: NORVASC  TAKE 1 TABLET(5 MG) BY MOUTH TWICE DAILY     b complex vitamins tablet     blood sugar diagnostic Strp  1 strip by Misc.(Non-Drug; Combo Route) route 2 (two) times daily. May have a 30 or 90 day supply     CMPD STP TOPICAL LOTION  Apply 120 mLs topically 3 (three) times daily. Apply externally 3 times daily.     diclofenac sodium 1 % Gel  Commonly known as: VOLTAREN  Apply 2 g topically once daily.     docusate sodium 100 MG capsule  Commonly known as: COLACE     lisinopriL 40 MG tablet  Commonly known as: PRINIVIL,ZESTRIL  Take 1 tablet (40 mg total) by mouth once daily.     metFORMIN 500 MG tablet  Commonly known as: GLUCOPHAGE  TAKE 1 TABLET BY MOUTH TWICE DAILY WITH MEALS     multivitamin capsule     nystatin ointment  Commonly known as: MYCOSTATIN  Apply topically 2 (two) times daily.      pantoprazole 20 MG tablet  Commonly known as: PROTONIX  Take 1 tablet (20 mg total) by mouth 2 (two) times daily before meals.     QUEtiapine 25 MG Tab  Commonly known as: SEROQUEL  TAKE 2 TABLETS BY MOUTH EVERY DAY AT 3PM AND 2 1/2 TABLETS BY MOUTH EVERY EVENING     sertraline 25 MG tablet  Commonly known as: ZOLOFT  Take 1 tablet (25 mg total) by mouth once daily.     simvastatin 40 MG tablet  Commonly known as: ZOCOR  TAKE 1 TABLET BY MOUTH EVERY DAY     TRUE METRIX GLUCOSE METER Misc  Generic drug: blood-glucose meter  Use as directed     TRUEPLUS LANCETS 33 gauge Misc  Generic drug: lancets        STOP taking these medications    lidocaine 5 %  Commonly known as: LIDODERM  Stopped by: Radha Winston MD     meclizine 12.5 mg tablet  Commonly known as: ANTIVERT  Stopped by: Radha Winston MD           Where to Get Your Medications      These medications were sent to [id number] - [place]  [facility] [address] [facility] [address]  [facility] [address] [address]    Phone: [phone]   · lisinopriL 40 MG tablet  · sertraline 25 MG tablet          Next/future visit:  flu, labs, home NP, BP?  Acp/adl.  Living will and  [place] post to be addressed by home nurse practitioner.  Glucose log.  Labs:  Lipids, cmp,HbA1C.  Consider chg to another statin.  Is patient wheelchair-bound?    Follow up in about 4 weeks (around 10/29/2020). Total face-to-face time was 70 min, >50% of this was spent on counseling and coordination of care. The following issues were discussed:  Medical history, surgical history, family history, substance history, socioeconomic history, lifestyle history, relationships, living situation, allergies, medications, IBS with chronic diarrhea, metformin side effects.  Chart review 10/02/2020 [id number] and 0945 to 10 10 for total of 40 min.  Recent Clinic notes reviewed, specialty visits reviewed, results, Legacy.  No Care everywhere.     Radha Winston MD  Internal  Medicine [facility]  [phone]

## 2020-10-02 PROBLEM — S82.90XD: Status: RESOLVED | Noted: 2017-03-21 | Resolved: 2020-10-02

## 2020-10-02 PROBLEM — Z00.00 HEALTHCARE MAINTENANCE: Status: ACTIVE | Noted: 2020-10-02

## 2020-10-02 PROBLEM — G47.00 INSOMNIA: Status: RESOLVED | Noted: 2017-07-25 | Resolved: 2020-10-02

## 2020-10-02 PROBLEM — J47.9 BRONCHIECTASIS WITHOUT COMPLICATION: Status: ACTIVE | Noted: 2020-10-02

## 2020-10-02 PROBLEM — R53.83 FATIGUE: Status: RESOLVED | Noted: 2019-05-13 | Resolved: 2020-10-02

## 2020-10-02 PROBLEM — I70.1 RENAL ARTERY STENOSIS: Status: ACTIVE | Noted: 2020-10-02

## 2020-10-02 PROBLEM — H35.30 MACULAR DEGENERATION: Status: ACTIVE | Noted: 2020-10-02

## 2020-10-02 PROBLEM — F33.9 EPISODE OF RECURRENT MAJOR DEPRESSIVE DISORDER: Status: ACTIVE | Noted: 2020-10-02

## 2020-10-02 NOTE — ASSESSMENT & PLAN NOTE
Patient followed by neurology in the past.  patient using wheelchair.  · Will continue to follow for decline, especially in light of dementia.

## 2020-10-02 NOTE — ASSESSMENT & PLAN NOTE
"CTA Chest Abdomen Pelvis - 9/2/2015    "There is atherosclerotic change of the thoracic and abdominal aorta with ulcerated plaque noted in the infrarenal abdominal aorta."  High-dose statin indicated.  · If possible in the future, plan to change to another statin.  "

## 2020-10-02 NOTE — ASSESSMENT & PLAN NOTE
Patient followed by outside ophthalmologist.  Patient already hearing impaired.  Developing macular degeneration.

## 2020-10-02 NOTE — ASSESSMENT & PLAN NOTE
Patient with psychotic episode status post hip repair with fracture 2017.  Patient on high-dose Seroquel.  Followed by psychiatry and neurology in the past.  · Continue Seroquel and Zoloft.  Will monitor for any side effects of high-risk medication.

## 2020-10-02 NOTE — ASSESSMENT & PLAN NOTE
Noted on CTA 09/02/2015:   two left renal arteries with stenosis of the origin of one of the accessory arteries   · Normal creatinine noted.  Blood pressure when last checked at goal.  · Will continue to follow.

## 2020-10-02 NOTE — ASSESSMENT & PLAN NOTE
Patient on high-dose Seroquel associated with significant psychosis status post repair of hip fracture in 2017.  Followed by psychiatry and neurology in the past.  · Risk of medication discussed today.  Will continue for quality life.

## 2020-10-02 NOTE — ASSESSMENT & PLAN NOTE
On prior imaging, ulcerated plaque noted in aorta.  High-dose statin indicated.  · Will discussed switching high-dose statin in the future.

## 2020-10-02 NOTE — ASSESSMENT & PLAN NOTE
IBS with alternating constipation and diarrhea, diarrhea predominant.  Limits patient's ability to leave home.  May be exacerbated by metformin.  · Will add Metamucil to metformin b.i.d..

## 2020-10-02 NOTE — ASSESSMENT & PLAN NOTE
Noted on CT a 09/02/2015:  bronchiectasis in the right lower lobe   · Patient currently asymptomatic, will follow.

## 2020-10-02 NOTE — ASSESSMENT & PLAN NOTE
Frequent lifelong loss of consciousness.  No workup noted in chart.  No recent episodes.  When discussing with daughter, sounds like vasovagal.  · To call with any further events.

## 2020-10-02 NOTE — ASSESSMENT & PLAN NOTE
Last blood pressure in chart at goal.  Patient tolerating current medications.  · Will continue to monitor home blood pressure.

## 2020-10-02 NOTE — ASSESSMENT & PLAN NOTE
· Yearly lab done 2/2020  · ACP discussed today, will have home nurse practitioner with palliative care discuss further with family.

## 2020-10-05 ENCOUNTER — TELEPHONE (OUTPATIENT)
Dept: PRIMARY CARE CLINIC | Facility: CLINIC | Age: 85
End: 2020-10-05

## 2020-10-05 NOTE — TELEPHONE ENCOUNTER
----- Message from Radha Winston MD sent at 10/2/2020  5:36 PM CDT -----  Home NPPlease mail a new pt packet.

## 2020-10-13 ENCOUNTER — IMMUNIZATION (OUTPATIENT)
Dept: INTERNAL MEDICINE | Facility: CLINIC | Age: 85
End: 2020-10-13
Payer: MEDICARE

## 2020-10-13 ENCOUNTER — CARE AT HOME (OUTPATIENT)
Dept: HOME HEALTH SERVICES | Facility: CLINIC | Age: 85
End: 2020-10-13
Payer: MEDICARE

## 2020-10-13 DIAGNOSIS — H91.90 DEAFNESS, UNSPECIFIED LATERALITY: ICD-10-CM

## 2020-10-13 DIAGNOSIS — R53.81 DEBILITY: ICD-10-CM

## 2020-10-13 DIAGNOSIS — R42 DIZZINESS: ICD-10-CM

## 2020-10-13 DIAGNOSIS — Z71.89 COUNSELING REGARDING ADVANCE CARE PLANNING AND GOALS OF CARE: ICD-10-CM

## 2020-10-13 DIAGNOSIS — F03.91 DEMENTIA WITH BEHAVIORAL DISTURBANCE, UNSPECIFIED DEMENTIA TYPE: ICD-10-CM

## 2020-10-13 DIAGNOSIS — Z51.5 PALLIATIVE CARE ENCOUNTER: Primary | ICD-10-CM

## 2020-10-13 DIAGNOSIS — Z23 NEED FOR IMMUNIZATION AGAINST INFLUENZA: ICD-10-CM

## 2020-10-13 PROCEDURE — G0008 FLU VACCINE - QUADRIVALENT - ADJUVANTED: ICD-10-PCS | Mod: HCNC,S$GLB,, | Performed by: INTERNAL MEDICINE

## 2020-10-13 PROCEDURE — 99350 HOME/RES VST EST HIGH MDM 60: CPT | Mod: 25,,, | Performed by: INTERNAL MEDICINE

## 2020-10-13 PROCEDURE — 90694 VACC AIIV4 NO PRSRV 0.5ML IM: CPT | Mod: HCNC,S$GLB,, | Performed by: INTERNAL MEDICINE

## 2020-10-13 PROCEDURE — 90654 PR  FLU VACCINE NO PRESERV, I: ICD-10-PCS | Mod: ,,, | Performed by: INTERNAL MEDICINE

## 2020-10-13 PROCEDURE — 1159F PR MEDICATION LIST DOCUMENTED IN MEDICAL RECORD: ICD-10-PCS | Mod: ,,, | Performed by: INTERNAL MEDICINE

## 2020-10-13 PROCEDURE — 1159F MED LIST DOCD IN RCRD: CPT | Mod: ,,, | Performed by: INTERNAL MEDICINE

## 2020-10-13 PROCEDURE — 99497 PR ADVNCD CARE PLAN 30 MIN: ICD-10-PCS | Mod: 25,,, | Performed by: INTERNAL MEDICINE

## 2020-10-13 PROCEDURE — G0008 ADMIN INFLUENZA VIRUS VAC: HCPCS | Mod: ,,, | Performed by: INTERNAL MEDICINE

## 2020-10-13 PROCEDURE — G0008 PR ADMIN INFLUENZA VIRUS VAC: ICD-10-PCS | Mod: ,,, | Performed by: INTERNAL MEDICINE

## 2020-10-13 PROCEDURE — 90694 FLU VACCINE - QUADRIVALENT - ADJUVANTED: ICD-10-PCS | Mod: HCNC,S$GLB,, | Performed by: INTERNAL MEDICINE

## 2020-10-13 PROCEDURE — 90654 PR  FLU VACCINE NO PRESERV, I: CPT | Mod: ,,, | Performed by: INTERNAL MEDICINE

## 2020-10-13 PROCEDURE — 99350 PR HOME VISIT,ESTAB PATIENT,LEVEL IV: ICD-10-PCS | Mod: 25,,, | Performed by: INTERNAL MEDICINE

## 2020-10-13 PROCEDURE — G0008 ADMIN INFLUENZA VIRUS VAC: HCPCS | Mod: HCNC,S$GLB,, | Performed by: INTERNAL MEDICINE

## 2020-10-13 PROCEDURE — 99497 ADVNCD CARE PLAN 30 MIN: CPT | Mod: 25,,, | Performed by: INTERNAL MEDICINE

## 2020-10-13 NOTE — PROGRESS NOTES
[facility]  Palliative Care Home Visit    Visit Date: 10/13/2020  Encounter Provider:  Alayna Butler DNP, FNP-C  PCP:  Radha Winston MD    Subjective:      Patient ID: Zeina White is a 89 y.o. female.    Consult Requested By:  Radha Winston M.D.  Reason for Consult:  Palliative Care        Chief Complaint: Establish Palliative Care      Outpatient Palliative Care Encounter:    PMHx:  Ms. Zeina White is a 89 y/o female with PMHx of dementia with behavioral disturbance, essential tremor, hearing loss, hyperlipidemia, essential hypertension, aortic atherosclerosis, renal artery stenosis, Type 2 diabetes mellitus with neurological manifestations, B12 deficiency, GERD, bronchiectasis, depression, osteoporosis, chronic neck pain, fragile X syndrome, syncope, Bilateral lower extremity edema, irritable bowel, macular degeneration, degenerative joint disease, chest pain, and vertigo.    PSHx:  Past surgical history includes left hip fracture surgery (), hysterectomy, joint replacement (pt in 70s), and quadriceps repair.     Social History:  Ms. White was  to Michael for 24 years; then ; he passed in  from heart disease. Patient has 7 adult children (4 daughters and 3 sons), youngest son  in . Patient has 7 grandchildren, and 7 great grandchildren. Patient has 3 siblings (1 sister and 2 brothers), sister passed away @ 2 years old, 1 brother passed away after birth, other brother  @40 years old from alcohol abuse.  Patient worked at [employer] as a  for 4-5 years, then got . Then, she worked at the [employer] 1 year. Patient she volunteered at [employer] for approximately 10 years. 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 Patient lives at home with 2 sons, daughter (3 children she lives with are mentally handicapped).    Patient loves to go to [facility] and spending time with her  family and children.  She used to like to go swimming at [place].     Impression:  Patient is seen today@home to establish palliative care services. Patient's daughter Destiny Franks and patient are both present. Patient is sitting in her wheelchair at the kitchen table. 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 No c/o pain at this time.     Pt c/o dizziness at times and hx of syncope. Reports dizziness when getting off toilet; dizziness occasionally after eating; seems to have increased frequency of dizziness in the last 2 years.     Patient is able to stand and ambulates a few steps with walker but she mostly transfers via wheelchair    Denies fever, chills, sore throat, SOB. SOB with exertion and +np cough    Patient/daughter denies fever in last 24-48 hours, no previous reaction to flu vaccine, no hx of organ transplant, no allergic to eggs. Requested flu vaccine to be given.    Flu vaccine given to patient left arm @1631 pm.     Goals of Care:  I initiated the process of advance care planning today and explained the importance of this process to the patient and family.  I introduced the concept of advance directives to the patient, as well. Then the patient received detailed information about the importance of designating a Health Care Power of  (HCPOA). She was also instructed to communicate with this person about her wishes for future healthcare, should she become sick and lose decision-making capacity. CODE STATUS DNR    I Introduced LaPOST form with patient/family, explaining this is the patient's wishes, and this form will be uploaded into the patient's [facility] and the [place] Registry.     Discussed goals of care and patient would like to be able to hear more and be more independent. Would like to be able to go to [employer].    PLAN:  1. Palliative Care NP to follow-up in 4-6 weeks  2. Continue all medications  3. Fall precautions  4. Patient/daughter to complete LaPOST  form  5. Flu vaccine given today>>if fever or arm pain give Tylenol prn   6. In Emergency, call 911 or go to ED. Notify PCP or palliative care NP    Review of Systems   Constitutional: Positive for fatigue. Negative for activity change, appetite change, chills and fever.   HENT: Positive for hearing loss. Negative for postnasal drip, rhinorrhea, sneezing and sore throat.    Eyes: Negative for discharge.   Respiratory: Positive for cough (non productive) and shortness of breath (with exertion). Negative for chest tightness and wheezing.    Cardiovascular: Negative for chest pain, palpitations and leg swelling.   Gastrointestinal: Negative for abdominal distention and abdominal pain.   Genitourinary: Negative for hematuria.        No burning with urination   Musculoskeletal: Positive for gait problem.   Skin: Negative for rash and wound.   Neurological: Positive for dizziness (when standing up from toilet) and weakness.   Hematological: Bruises/bleeds easily.   Psychiatric/Behavioral: Negative for agitation and hallucinations. The patient is nervous/anxious.        Assessments:  · Environmental: two story house, clean, uncluttered,  Good lighting  · Functional Status: needs assistance bathing and dressing; feeds self  · Safety: fall precautions an Covid 19 Virus precautions  · Nutritional: eats 3 meals a day, snacks throughout the day, (loves sweets)  · Home Health/DME/Supplies:  Walker, wheelchair, bedside commode, shower chair, toilet extender,       Symptom Assessment (ESAS 0-10 scale)     ESAS 0 1 2 3 4 5 6 7 8 9 10   Pain x             Dyspnea x             Anxiety        x      Nausea x             Depression    x           Anorexia x             Fatigue        x      Insomnia x             Restlessness     x          Agitation   x             Constipation    no  Bowel Management Plan (BMP): yes  Diarrhea        yes  Comments: occasional constipation; eats prunes daily; takes colace.LBM 10/12     Performance  Status:   PPS Score: 50%  Karnofsky Score:  50%  FAST: 6e    History:  Past Medical History:   Diagnosis Date    Anxiety     Atypical chest pain     Dementia     Diabetes mellitus     DJD (degenerative joint disease)     GERD (gastroesophageal reflux disease)     Hyperlipidemia     Hypertension     Irritable bowel     Tremor, essential     Vertigo      Family History   Problem Relation Age of Onset    Cancer Mother         kidney    Diabetes Father     Parkinsonism Father     Alcohol abuse Brother     Fragile X syndrome Son     Hypertension Son     Fragile X syndrome Son     Diabetes Son     Fragile X syndrome Son     Fragile X syndrome Daughter     Hyperlipidemia Daughter     Hypertension Daughter     Heart attack Daughter     No Known Problems Daughter     Breast cancer Neg Hx     Colon cancer Neg Hx     Ovarian cancer Neg Hx      Past Surgical History:   Procedure Laterality Date    HIP FRACTURE SURGERY Left 2017    HYSTERECTOMY      JOINT REPLACEMENT  pt in 70s    L total knee    QUADRICEPS REPAIR      R     Review of patient's allergies indicates:   Allergen Reactions    Glimepiride      Other reaction(s): tremor  Other reaction(s): tremor    Naproxen      Other reaction(s): Jittery  Other reaction(s): Jittery       Medications:    Current Outpatient Medications:     acetaminophen (TYLENOL) 500 MG tablet, Take 500 mg by mouth 3 (three) times daily as needed for Pain., Disp: , Rfl:     amLODIPine (NORVASC) 5 MG tablet, TAKE 1 TABLET(5 MG) BY MOUTH TWICE DAILY, Disp: 180 tablet, Rfl: 1    b complex vitamins tablet, Take 1 tablet by mouth once daily., Disp: , Rfl:     diclofenac sodium (VOLTAREN) 1 % Gel, Apply 2 g topically once daily., Disp: 100 g, Rfl: 11    docusate sodium (COLACE) 100 MG capsule, Take 100 mg by mouth once daily., Disp: , Rfl:     lisinopriL (PRINIVIL,ZESTRIL) 40 MG tablet, Take 1 tablet (40 mg total) by mouth once daily., Disp: 90 tablet, Rfl: 3     "metFORMIN (GLUCOPHAGE) 500 MG tablet, TAKE 1 TABLET BY MOUTH TWICE DAILY WITH MEALS, Disp: 180 tablet, Rfl: 3    multivitamin capsule, Take 1 capsule by mouth once daily., Disp: , Rfl:     pantoprazole (PROTONIX) 20 MG tablet, Take 1 tablet (20 mg total) by mouth 2 (two) times daily before meals., Disp: 180 tablet, Rfl: 3    QUEtiapine (SEROQUEL) 25 MG Tab, TAKE 2 TABLETS BY MOUTH EVERY DAY AT 3PM AND 2 1/2 TABLETS BY MOUTH EVERY EVENING, Disp: 405 tablet, Rfl: 3    sertraline (ZOLOFT) 25 MG tablet, Take 1 tablet (25 mg total) by mouth once daily., Disp: 90 tablet, Rfl: 3    simvastatin (ZOCOR) 40 MG tablet, TAKE 1 TABLET BY MOUTH EVERY DAY, Disp: 90 tablet, Rfl: 1    TRUE METRIX GLUCOSE METER Misc, Use as directed, Disp: 1 each, Rfl: 0    TRUEPLUS LANCETS 33 gauge Misc, , Disp: , Rfl:     blood sugar diagnostic Strp, 1 strip by Misc.(Non-Drug; Combo Route) route 2 (two) times daily. May have a 30 or 90 day supply, Disp: 100 each, Rfl: 11    CMPD triamcinolone 0.1% (15 gm)- mycostatin powder (2 million units)- calamine lotion (QS), Apply 120 mLs topically 3 (three) times daily. Apply externally 3 times daily. (Patient not taking: Reported on 10/13/2020), Disp: 120 mL, Rfl: 3    nystatin (MYCOSTATIN) ointment, Apply topically 2 (two) times daily. (Patient not taking: Reported on 10/13/2020), Disp: 30 g, Rfl: 0    ondansetron (ZOFRAN) 4 MG tablet, Take 1 tablet (4 mg total) by mouth every 8 (eight) hours as needed for Nausea., Disp: 14 tablet, Rfl: 0    24h Oral Morphine Equivalents (OME):      Objective:     Physical Exam:  Vitals:    10/13/20 1316   BP: 134/68   Pulse: 79   Resp: 20   Temp: 97.8 °F (36.6 °C)   TempSrc: Temporal   SpO2: (!) 94%   Weight: 62.6 kg (138 lb)   Height: 4' 8" (1.422 m)   PainSc: 0-No pain   PainLoc: Knee     Body mass index is 30.94 kg/m².    Physical Exam  Vitals signs and nursing note reviewed.   Constitutional:       Appearance: Normal appearance.   HENT:      Head: " Normocephalic and atraumatic.      Nose: No congestion or rhinorrhea.   Eyes:      Extraocular Movements: Extraocular movements intact.      Conjunctiva/sclera: Conjunctivae normal.   Neck:      Musculoskeletal: Normal range of motion and neck supple.   Cardiovascular:      Rate and Rhythm: Normal rate and regular rhythm.      Pulses: Normal pulses.      Heart sounds: Murmur (grade II/VI) present.   Pulmonary:      Effort: Pulmonary effort is normal.      Breath sounds: Normal breath sounds.      Comments: BBS CTA  Abdominal:      General: Bowel sounds are normal. There is no distension.      Palpations: Abdomen is soft.      Tenderness: There is no abdominal tenderness.   Musculoskeletal: Normal range of motion.      Right lower leg: No edema.      Left lower leg: No edema.   Skin:     General: Skin is warm and dry.      Capillary Refill: Capillary refill takes 2 to 3 seconds.      Coloration: Skin is pale.   Neurological:      Mental Status: She is alert.      Motor: Weakness present.      Gait: Gait abnormal.      Comments: AA&Ox 1-2 person, place   Psychiatric:         Mood and Affect: Mood normal.         Behavior: Behavior normal.         Labs:  CBC:   WBC   Date Value Ref Range Status   02/03/2020 9.51 3.90 - 12.70 K/uL Final       Hemoglobin   Date Value Ref Range Status   02/03/2020 13.5 12.0 - 16.0 g/dL Final       Hematocrit   Date Value Ref Range Status   02/03/2020 42.2 37.0 - 48.5 % Final       MCV   Date Value Ref Range Status   02/03/2020 92 82 - 98 fL Final       Platelets   Date Value Ref Range Status   02/03/2020 211 150 - 350 K/uL Final       LFT:   Lab Results   Component Value Date    AST 18 02/03/2020    ALKPHOS 71 02/03/2020    BILITOT 0.4 02/03/2020       Albumin:   Albumin   Date Value Ref Range Status   02/03/2020 4.0 3.5 - 5.2 g/dL Final     Protein:   Total Protein   Date Value Ref Range Status   02/03/2020 7.3 6.0 - 8.4 g/dL Final       Radiology:  I have reviewed all pertinent imaging  results/findings within the past 24 hours.    Psychosocial/Cultural/Spiritual:   · F- Helen and Belief:  Hinduism   · I - Importance:  highly  · C - Community: [facility]  · A - Address in Care: no spiritual needs identified      Assessment:     1. Dementia with behavioral disturbance, unspecified dementia type    2. Counseling regarding advance care planning and goals of care    3. Palliative care encounter    4. Debility    5. Deafness, unspecified laterality        Plan:     Ethical / Legal: Advance Care Planning   · Surrogate decision maker:  Name:  Destiny Franks  · Relationship: daughter   · Code Status:  DNR (pt has living will daughter will give copy)  · LaPOST:  Left LaPOST form at patient's house for daughter to complete 10/13  · Other advance directive:  Yes (need copy)  · Capacity to make medical decisions:  limited, Conflict: done       Zeina was seen today for [facility] care.    Assessment and Plan:    Dementia with behavioral disturbance, unspecified dementia type  -     Ambulatory referral/consult to [facility]    Counseling regarding advance care planning and goals of care  -discussed goals of care and she would like to be more independent, would like to be able to hear more, and she would like to go to [facility]    Palliative care encounter  --palliative care referral placed  -initiate palliative care services today 10/13/2020  -Reviewed LaPOST Form & left form at patient's home   -CODE STATUS>>>DNR  -Discussed Palliative care and Hospice  -Patient/family want to continue with Palliative care services      Debility  -fall precautions  -transfers via wheelchair      Deafness, unspecified laterality  -continue visual communication board     Were controlled substances prescribed?  No    Total face-to-face time was 90min  >50% of 60 min was spent on counseling and coordination of care. The following issues were discussed:     An additional 30  minutes of the visit was spent in advanced care planning.  Explained to patient what palliative care is and palliative care vs home health and hospice. Reviewed LaPOST and AD with patient. Discussed goals of care       Follow Up Appointments:   No future appointments.    Patient and family agreed via verbal consent to access medical records and for assessment and treatment during this visit.      Attestation: Screening criteria to assess the level of the patient's risk for infection with COVID-19 as recommended by the CDC at the time of the above documented home visit concluded appropriateness to proceed.     Universal precautions were maintained at all times, including provider use of >60% alcohol gel hand  immediately prior to entry and upon departing the patient's home as well as cleaning of equipment used in home visit with antibacterial/germicidal disposable wipes.    Patient has not been exposed to anyone with the virus (to the patient's knowledge)  Patient has not been out of the country in the last 14 days.    NP Nurse wore face mask entire length of visit    Alayna Butler DNP, FNP-C  [facility]  [phone]

## 2020-10-22 ENCOUNTER — TELEPHONE (OUTPATIENT)
Dept: PRIMARY CARE CLINIC | Facility: CLINIC | Age: 85
End: 2020-10-22

## 2020-10-22 ENCOUNTER — NURSE TRIAGE (OUTPATIENT)
Dept: ADMINISTRATIVE | Facility: CLINIC | Age: 85
End: 2020-10-22

## 2020-10-22 RX ORDER — ONDANSETRON 4 MG/1
4 TABLET, FILM COATED ORAL EVERY 8 HOURS PRN
Qty: 14 TABLET | Refills: 0 | Status: SHIPPED | OUTPATIENT
Start: 2020-10-22

## 2020-10-22 NOTE — TELEPHONE ENCOUNTER
"No PPI last night.  gerd this AM.  3 tums and no relief.  Given a dose of PPI this am.  Pt having anxiety associated with this gerd. Fell asleep after getting.  Awoke later with continued symptoms.  No SOB/diaphoresis.  She was having nausea today.  Has had similar pain relieved with tums in the past.  PPI Not working as well as zantac in the past.  BM last night.   Will call in zofran.  No vomiting.  Pt is a "very anxious person" per dtr.  To call if not feeling better or chg in symptoms from prior.    "

## 2020-10-22 NOTE — TELEPHONE ENCOUNTER
Spoke to daughter, she states the patient is still c/o abdominal pain. Daughter gave the patient another dose of protonix at 930AM and gas x at 10:10. Please call them and advise.

## 2020-10-22 NOTE — TELEPHONE ENCOUNTER
Spoke to daughter to inform her that Dr Winston wants her to come in today at 3PM, the daughter stated that the patient just passed away and EMS is currently at their house. Transferred call to Dr Winston.

## 2020-10-22 NOTE — TELEPHONE ENCOUNTER
"Patient's daughter reports mother is having diaphragm discomfort and reports she has reflux     Reason for Disposition   Chest pain(s) lasting a few seconds    Additional Information   Negative: Severe difficulty breathing (e.g., struggling for each breath, speaks in single words)   Negative: Difficult to awaken or acting confused (e.g., disoriented, slurred speech)   Negative: Shock suspected (e.g., cold/pale/clammy skin, too weak to stand, low BP, rapid pulse)   Negative: [1] Chest pain lasts > 5 minutes AND [2] age > 45   Negative: [1] Chest pain lasts > 5 minutes AND [2] age > 30 AND [3] one or more cardiac risk factors (e.g., diabetes, high blood pressure, high cholesterol, smoker, or strong family history of heart disease)   Negative: [1] Chest pain lasts > 5 minutes AND [2] history of heart disease (i.e., angina, heart attack, heart failure, bypass surgery, takes nitroglycerin)   Negative: [1] Chest pain lasts > 5 minutes AND [2] described as crushing, pressure-like, or heavy   Negative: Passed out (i.e., lost consciousness, collapsed and was not responding)   Negative: Heart beating < 50 beats per minute OR > 140 beats per minute   Negative: Visible sweat on face or sweat dripping down face   Negative: Sounds like a life-threatening emergency to the triager   Negative: Followed a chest injury   Negative: SEVERE chest pain   Negative: [1] Chest pain (or "angina") comes and goes AND [2] is happening more often (increasing in frequency) or getting worse (increasing in severity)   Negative: Pain also in shoulder(s) or arm(s) or jaw   Negative: Difficulty breathing   Negative: Dizziness or lightheadedness   Negative: Coughing up blood   Negative: Cocaine use within last 3 days   Negative: Major surgery in the past month   Negative: Hip or leg fracture (broken bone) in past month (or had cast on leg or ankle in past month)   Negative: Illness requiring prolonged bedrest in past month (e.g., " "immobilization, long hospital stay)   Negative: Long-distance travel in past month (e.g., car, bus, train, plane; with trip lasting 6 or more hours)   Negative: History of prior "blood clot" in leg or lungs (i.e., deep vein thrombosis, pulmonary embolism)   Negative: History of inherited increased risk of blood clots (e.g., Factor 5 Leiden, Anti-thrombin 3, Protein C or Protein S deficiency, Prothrombin mutation)   Negative: [1] Chest pain lasts > 5 minutes AND [2] occurred in past 3 days (72 hours)   Negative: Taking a deep breath makes pain worse   Negative: Patient sounds very sick or weak to the triager   Negative: Cancer treatment in the past two months (or has cancer now)   Negative: Chest pain(s) lasting a few seconds from coughing   Negative: [1] Chest pain lasting < 5 minutes AND [2] has not taken prescribed nitroglycerin   Negative: [1] Chest pain lasting < 5 minutes AND [2] completely gone after taking nitroglycerin   Negative: [1] Chest pain from known angina comes and goes AND [2] is NOT happening more often (increasing in frequency) or getting worse (increasing in severity)   Negative: [1] Chest pain(s) lasting a few seconds from coughing AND [2] persists > 3 days   Negative: [1] Chest pain(s) lasting a few seconds AND [2] persists > 3 days   Negative: [1] Patient says chest pain feels exactly the same as previously diagnosed "heartburn" AND [2] describes burning in chest AND [3] accompanying sour taste in mouth   Negative: [1] Chest pain lasts > 5 minutes AND [2] occurred > 3 days ago (72 hours) AND [3] NO chest pain or cardiac symptoms now   Negative: [1] Chest pain lasting < 5 minutes AND [2] NO chest pain or cardiac symptoms (e.g., breathing difficulty, sweating) now   Negative: Fever > 100.4 F (38.0 C)   Negative: Rash in same area as pain (may be described as "small blisters")    Protocols used: CHEST PAIN-A-AH      "

## 2020-10-22 NOTE — TELEPHONE ENCOUNTER
Patient's daughter called stating the patient missed a does of protonix yesterday at 4PM and took a protonix and 3 tums at 5AM this morning. The patient is still c/o of abdominal pain and chest pain, The daughter wants to know what she should do at this point.

## 2020-10-23 ENCOUNTER — TELEPHONE (OUTPATIENT)
Dept: PRIMARY CARE CLINIC | Facility: CLINIC | Age: 85
End: 2020-10-23

## 2020-10-23 NOTE — TELEPHONE ENCOUNTER
----- Message from Diann He sent at 10/23/2020  2:55 PM CDT -----  Regarding: Death Certificate  Contact: Fercho Burleson [phone]  Death certificate has been sent through [facility]

## 2020-11-06 ENCOUNTER — TELEPHONE (OUTPATIENT)
Dept: PRIMARY CARE CLINIC | Facility: CLINIC | Age: 85
End: 2020-11-06

## 2020-11-15 VITALS
DIASTOLIC BLOOD PRESSURE: 68 MMHG | TEMPERATURE: 98 F | RESPIRATION RATE: 20 BRPM | OXYGEN SATURATION: 94 % | BODY MASS INDEX: 31.05 KG/M2 | SYSTOLIC BLOOD PRESSURE: 134 MMHG | WEIGHT: 138 LBS | HEART RATE: 79 BPM | HEIGHT: 56 IN

## 2020-11-15 NOTE — PATIENT INSTRUCTIONS
FOLLOW-UP INSTRUCTIONS:    1. Follow-up with palliative care NP in 4-6 weeks on   2. Continue all medications, treatments, and therapies as ordered  3. Fall precautions at all times  4. Maintain Safety precautions at all times  5. Attend all medical appointments as scheduled  6. F/u with PCP as needed  7. Patient to have 6 feet between each other  8. In an Emergency, call 911 or go to ED; notify PCP's office  9. Limit Risks of environmental exposure to coronavirus as discussed including: social distancing, hand hygiene, and limiting departures from the home for necessities only.   10. Patient not to be left alone  11. Rx sent to pharmacy of choice(if Rx was ordered during visit)  12. Received flu vaccine today. Monitor temperature. If fever or pain to left arm, give Tylenol as needed.     Dementia and Caregiver Support  Dementia is a chronic condition that affects the brain. It causes a gradual loss of memory and higher intellectual functions. A person with dementia may have trouble recognizing familiar people and places, or knowing what day it is. The persons memory, judgment, and decision-making may also be affected. In severe cases, the person may not respond when someone talks to him or her.  The most common form of dementia is Alzheimers disease. Doctors dont fully understand what causes AD. It has no cure. But medicines can treat some of the symptoms.  Some of the less common causes for dementia are curable. So its important to have a complete medical evaluation to look for conditions that can be treated.  Home care  These tips can help you care for a person with AD at home:  · A responsible person must be with the person who has advanced AD at all times.  He or she should not be left alone or unsupervised.  · In the case of advanced AD, keep all medicines in a secure place. They should be under the caregivers control. A person with advanced AD should not be allowed to take his or her own medicines. This  needs to be supervised by the caregiver.  Here are ways to help a person with dementia:  Activities  Keep to a daily routine. Changes in routine can cause stress for someone with dementia. Make a schedule for common daily tasks. These include bathing, dressing, taking medicines, eating meals, going for walks, and going to bed.  Communication  When talking to a person with dementia, talk slowly and clearly. Use a gentle tone of voice. Choose short, simple words and sentences. Ask one question at a time. Dont interrupt, criticize, or argue. Be calm and supportive. Use friendly facial expressions. Use pointing and touching to help communicate. If the person has a loss of long-term memory, dont ask questions about past events. Instead, talk about what is happening now.  Behavioral tips  Use lists, signs, family photos, clocks, and calendars as memory aids. Label cabinets and drawers. Try to distract, not confront, the person. When he or she becomes frustrated or upset, direct the persons attention to eating or some other interesting activity.  Medical-legal tips  Talk with your doctor or  about getting a power of  for healthcare and for financial decisions. It is best to do this while the person can still sign legal documents and make his or her own legal decisions. Otherwise, you'll need a court order.  Support for the caregiver  As the caregiver, you will need a lot of support for yourself. Caring for a person with dementia is a full-time job. It can drain your emotions and lead to frustration and anger toward the one you love. It is common to have feelings of grief over losing the relationship that you once had. As a caregiver to someone with dementia, you are at higher risk for depression, anxiety and stress.  Here are some tips to help you cope with being a caregiver:  · Learn about dementia and Alzheimers disease so you know what to expect.  · Find out about the resources in your community,  including adult day-care programs. Ask your healthcare provider for a referral to a , if needed.  · Take care of yourself with a healthy diet, exercise, and plenty of rest.  · Ask for help. Share some of the caretaking duties with family and friends.  · Make personal time for yourself. This is essential! Consider hiring an in-home sitter or home health aide.  · Seek counseling or join a caregivers support group. Don't isolate yourself or try to cope with this alone. In a support group, you can learn from others in a similar situation.  · Visit the Alzheimers Association website (www.alz.org) for more information.  Follow-up care  Follow up with the persons healthcare provider, or as advised.  When to seek medical advice  Call your healthcare provider right away if any of these occur:  · Frequent falls  · The person refuses to eat or drink  · Violent behavior or behavior becomes too difficult to manage at home  · Increased drowsiness, or failure to respond normally  · Headache or nausea that gets worse, or repeated vomiting  · Numbness or weakness of the face, an arm, or a leg  · Slurred speech, trouble speaking, walking, or seeing  · Fainting spell, dizziness, or seizure (staring spells, lip-smacking, twitching, sudden changes in mental status)  · Unexplained fever of 100.4º F (38.0º C) or higher  Date Last Reviewed: 8/1/2016  © 6892-3901 [employer]. [address] [place] [address], [place] [address]. All rights reserved. This information is not intended as a substitute for professional medical care. Always follow your healthcare professional's instructions

## 2022-12-22 NOTE — CONSULTS
Consult Note  Orthopaedics    SUBJECTIVE:     History of Present Illness:  Patient is a 89 y.o. female with PMHx of HTN, DM2 who presents with chief complaint of left hip pain s/p fall from standing height this morning. Patient lives at home with her 3 daughters. She typically walks with a cane or walker, but not in the house. She denies LOC or any other injuries.     No hx of AICD or pacemaker   Not on anticoagulation   Hx of left TKA by Dr [facility]   Hx of right quadriceps tendon repair by Dr Paige     Scheduled Meds:   famotidine  20 mg Oral BID    lisinopril  40 mg Oral Daily    nortriptyline  10 mg Oral QHS    polyethylene glycol  17 g Oral Daily    senna-docusate 8.6-50 mg  1 tablet Oral BID    simvastatin  40 mg Oral QHS     Continuous Infusions:   PRN Meds:acetaminophen, dextrose 50%, dextrose 50%, glucagon (human recombinant), glucose, glucose, insulin aspart, meclizine, ondansetron, oxycodone, promethazine (PHENERGAN) IVPB, ramelteon    Review of patient's allergies indicates:   Allergen Reactions    Naproxen      Other reaction(s): Jittery       Past Medical History:   Diagnosis Date    Anxiety     Atypical chest pain     Dementia     Diabetes mellitus     DJD (degenerative joint disease)     GERD (gastroesophageal reflux disease)     Hyperlipidemia     Hypertension     Irritable bowel     Tremor, essential     Vertigo      Past Surgical History:   Procedure Laterality Date    HYSTERECTOMY      JOINT REPLACEMENT      L total knee    KNEE SURGERY      bilat    QUADRICEPS REPAIR      R     Family History   Problem Relation Age of Onset    Cancer Mother      kidney    Diabetes Father      Social History   Substance Use Topics    Smoking status: Never Smoker    Smokeless tobacco: Never Used    Alcohol use No        Review of Systems:  Patient denies constitutional symptoms, cardiac symptoms, respiratory symptoms, GI symptoms.  The remainder of the musculoskeletal ROS is included in  the HPI.      OBJECTIVE:     Vital Signs (Most Recent)  Temp: 98.1 °F (36.7 °C) (03/21/17 0741)  Pulse: 95 (03/21/17 0831)  Resp: 16 (03/21/17 0741)  BP: (!) 177/85 (03/21/17 0831)  SpO2: 98 % (03/21/17 0831)    Physical Exam:  Gen:  No acute distress  CV:  Peripherally well-perfused.  Pulses 2+ bilaterally.  Lungs:  Normal respiratory effort.  Abdomen:  Soft, non-tender, non-distended  Head/Neck:  Normocephalic.  Atraumatic. No TTP, AROM and PROM intact without pain  Neuro:  Alert and Oriented x3, CN intact without deficit  Pelvis: No TTP, Stable to direct anterior pressure over ASIS.     MSK:  Left leg shortened and externally rotated   Sensation intact to light touch  Motor intact   Compartments are soft, no pain with passive stretch  Pulses 2+ distally    Laboratory:  Recent Results (from the past 24 hour(s))   CBC auto differential    Collection Time: 03/21/17  5:44 AM   Result Value Ref Range    WBC 12.33 3.90 - 12.70 K/uL    RBC 4.57 4.00 - 5.40 M/uL    Hemoglobin 13.4 12.0 - 16.0 g/dL    Hematocrit 41.1 37.0 - 48.5 %    MCV 90 82 - 98 fL    MCH 29.3 27.0 - 31.0 pg    MCHC 32.6 32.0 - 36.0 %    RDW 13.3 11.5 - 14.5 %    Platelets 179 150 - 350 K/uL    MPV 11.6 9.2 - 12.9 fL    Gran # 10.5 (H) 1.8 - 7.7 K/uL    Lymph # 1.0 1.0 - 4.8 K/uL    Mono # 0.6 0.3 - 1.0 K/uL    Eos # 0.1 0.0 - 0.5 K/uL    Baso # 0.03 0.00 - 0.20 K/uL    Gran% 85.3 (H) 38.0 - 73.0 %    Lymph% 8.4 (L) 18.0 - 48.0 %    Mono% 4.9 4.0 - 15.0 %    Eosinophil% 0.6 0.0 - 8.0 %    Basophil% 0.2 0.0 - 1.9 %    Differential Method Automated    Comprehensive metabolic panel    Collection Time: 03/21/17  5:44 AM   Result Value Ref Range    Sodium 139 136 - 145 mmol/L    Potassium 3.6 3.5 - 5.1 mmol/L    Chloride 101 95 - 110 mmol/L    CO2 26 23 - 29 mmol/L    Glucose 281 (H) 70 - 110 mg/dL    BUN, Bld 15 8 - 23 mg/dL    Creatinine 0.7 0.5 - 1.4 mg/dL    Calcium 8.8 8.7 - 10.5 mg/dL    Total Protein 6.4 6.0 - 8.4 g/dL    Albumin 3.4 (L) 3.5 - 5.2  g/dL    Total Bilirubin 0.5 0.1 - 1.0 mg/dL    Alkaline Phosphatase 82 55 - 135 U/L    AST 16 10 - 40 U/L    ALT 16 10 - 44 U/L    Anion Gap 12 8 - 16 mmol/L    eGFR if African American >60.0 >60 mL/min/1.73 m^2    eGFR if non African American >60.0 >60 mL/min/1.73 m^2   Type & Screen    Collection Time: 03/21/17  5:44 AM   Result Value Ref Range    Group & Rh A POS     Indirect Bety NEG    POCT glucose    Collection Time: 03/21/17  8:06 AM   Result Value Ref Range    POCT Glucose 223 (H) 70 - 110 mg/dL       Diagnostic Results:  X-Ray: Reviewed   Left basicervical femoral neck fracture, in varus   Left TKA    ASSESSMENT/PLAN:     Zeina White is a 89 y.o. female with left basicervical femoral neck fracture    --Admit to medicine hip fracture service for pre-operative clearance and medical evaluation  --To OR today for operative fixation of left hip fracture with intramedullary nail fixation, if cleared  --Consents obtained from daughter, POA   --Pre-operative labs and imaging obtained in ED (UA, Type and Cross, PT-INR, CBC, BMP, PreAlbumin, CXR, EKG)   --Bed rest, small, NPO     Risks and complications were discussed including but not limited to the risks of anesthetic complications, infection, wound healing complications, non-union, mal-union, hardware failure, pain, stiffness, DVT, pulmonary embolism, perioperative medical risks (cardiac, pulmonary, renal, neurologic), and death among others were discussed. No guarantees were made and the patient and family elect to proceed. They fully understand the reported 30% mortality risk within the first year of surgery.             Demetri Her MD  Orthopedic Surgery, PGY3       8

## 2024-11-26 NOTE — OP NOTE
0817 Patient received in CT scanning for pre-procedure hepatic abscess aspiration assessment. Monitor applied.   0826 Dr. Leone here; spoke to patient.   0829 This procedure has been fully reviewed with the patient and written informed consent has been obtained.   0831 Patient assisted to CT table and pre scan started.   0839 Dr Leone reviewing pre scan; states that he can't visualize the abscess so we need to give IV contrast and repeat the scan.   0850 After reviewing the repeat scan, Dr Leone is unable to visualize any abscesses. Dr Leone spoke with patient regarding his findings.   0858 Patient on cart; comfort ensured.  0900 Report called to Silke RN and patient taken to OPN via transport with family at bedside. Pt alert and oriented x3; follows commands. Skin pink, warm, and dry. Respirations easy, regular, and nonlabored.            DATE OF PROCEDURE:  03/21/2017    PREOPERATIVE DIAGNOSES:  Left basicervical femoral neck/intertrochanteric femur   fracture.    POSTOPERATIVE DIAGNOSES:  Left basicervical femoral neck/intertrochanteric femur   fracture.    PROCEDURE PERFORMED:  Cephalomedullary nail fixation of left basicervical   femoral neck fracture, 96707.    SURGEON:  Presley Cornejo M.D.    ASSISTANT:  Ha Her M.D. (RES)    ANESTHESIA:  General endotracheal.    ESTIMATED BLOOD LOSS:  100 mL.    IV FLUIDS:  1000 mL crystalloid.    IMPLANTS:  Synthes trochanteric fixation nail advanced 340 x 11 with 95 mm hip   screw.    INDICATIONS FOR PROCEDURE:  The patient is an 89-year-old female who fell from a   standing height resulting in a displaced left basicervical femoral neck   fracture.  The patient is being brought to the Operating Room for   cephalomedullary nail fixation.  The risks, benefits and alternatives of surgery   were discussed with the patient's daughters who were power of .    Informed consent was obtained.    PROCEDURE IN DETAIL:  The patient was identified in the preoperative holding   area and the site was marked.  Regional anesthesia was performed.  The patient   was wheeled into the Operating Room and general endotracheal anesthesia was   induced in the patient's hospital bed.  Preoperative antibiotics were   administered.  The patient was then placed on to the PROfx fracture table with   all bony prominences well padded and the both lower extremities in traction   boots.  A timeout was undertaken to confirm patient, site, surgery, surgeon and   administration of preoperative antibiotics.  All agreed and we proceeded.    Fluoroscopy was brought in and closed reduction maneuvers were performed on the   table getting good alignment.  Left hip and lower extremity were prepped and   draped in sterile fashion.  A starting incision was then made and the guidewire   for the entry reamer was placed in the appropriate  position on the greater   trochanter and driven to the level of lesser trochanter.  The entry reamer was   then used.  These were removed and the reaming david was placed in the distal   aspect of the femur.  Measurement was undertaken and a 340 mm nail was   appropriate.  A single pass was made with a 12.5 mm reamer and the nail was   placed under direct lateral fluoroscopic visualization distally to ensure center   position in the canal and then it overlapped with the total knee flange.    We then went to the AP of the hip, got this at the appropriate height, made a   separate lateral incision and then inserted the cannula and using AP and lateral   fluoroscopy, placed a guidewire in center-center position in the femoral head.    We measured this, drilled and placed a 95 mm hip screw and then compressed this   through the insertion handle with very good compression.  Cephalic antirotation   locking screw was then tightened.  The patient had a stable fracture pattern   with good compression and did not require distal interlocking.    The wounds were copiously irrigated with normal saline solution and closed with   0 Vicryl suture in the deep tissue proximally, 2-0 Vicryl suture in subcutaneous   tissues in both incisions and 3-0 nylon sutures in the skin.  Sterile dressings   were applied.    All instrument and sponge counts were reported correct at the end of the case.    There were no complications.  The patient was returned to supine position on the   hospital bed, extubated, awakened and taken to the Recovery Room in stable   condition.    PLAN FOR THE PATIENT:  Will be weightbearing as tolerated with immediate   physical and occupational therapy.      JILL  dd: 03/21/2017 19:52:54 (CDT)  td: 03/21/2017 23:02:08 (CDT)  Doc ID   #[id number]  Job ID #[id number]    CC:

## (undated) DEVICE — DRAPE PLASTIC U 60X72

## (undated) DEVICE — KIT PT CARE HANA PROFX SSXT

## (undated) DEVICE — IMPLANTABLE DEVICE
Type: IMPLANTABLE DEVICE | Site: FEMUR | Status: NON-FUNCTIONAL
Removed: 2017-03-21

## (undated) DEVICE — APPLICATOR CHLORAPREP ORN 26ML

## (undated) DEVICE — SUT D SPECIAL VICRYL 2-0

## (undated) DEVICE — TRAY MINOR ORTHO

## (undated) DEVICE — Device

## (undated) DEVICE — DRAPE C-ARMOR EQUIPMENT COVER

## (undated) DEVICE — SEE MEDLINE ITEM 157150

## (undated) DEVICE — DRAPE IOBAN 2 STERI

## (undated) DEVICE — DRAPE C ARM 42 X 120 10/BX

## (undated) DEVICE — DRESSING AQUACEL AG ADV 3.5X6

## (undated) DEVICE — SUT 0 VICRYL / CT-1

## (undated) DEVICE — SPONGE LAP 18X18 PREWASHED

## (undated) DEVICE — GAUZE SPONGE 4X4 12PLY

## (undated) DEVICE — BIT DRILL QC 3FLUTED 4.2X145 S

## (undated) DEVICE — WIRE GUIDE 3.2MM 400MM
Type: IMPLANTABLE DEVICE | Site: FEMUR | Status: NON-FUNCTIONAL
Removed: 2017-03-21

## (undated) DEVICE — DRAPE STERI U-SHAPED 47X51IN

## (undated) DEVICE — SUT ETHILON 3/0 18IN PS-1